# Patient Record
Sex: FEMALE | Race: BLACK OR AFRICAN AMERICAN | NOT HISPANIC OR LATINO | Employment: OTHER | URBAN - METROPOLITAN AREA
[De-identification: names, ages, dates, MRNs, and addresses within clinical notes are randomized per-mention and may not be internally consistent; named-entity substitution may affect disease eponyms.]

---

## 2017-02-17 ENCOUNTER — ALLSCRIPTS OFFICE VISIT (OUTPATIENT)
Dept: OTHER | Facility: OTHER | Age: 42
End: 2017-02-17

## 2017-02-21 ENCOUNTER — LAB (OUTPATIENT)
Dept: LAB | Facility: CLINIC | Age: 42
End: 2017-02-21
Payer: MEDICARE

## 2017-02-21 DIAGNOSIS — E87.8 OTHER DISORDERS OF ELECTROLYTE AND FLUID BALANCE, NOT ELSEWHERE CLASSIFIED: ICD-10-CM

## 2017-02-21 DIAGNOSIS — N18.30 CHRONIC KIDNEY DISEASE, STAGE III (MODERATE) (HCC): ICD-10-CM

## 2017-02-21 LAB
ANION GAP SERPL CALCULATED.3IONS-SCNC: 10 MMOL/L (ref 4–13)
BUN SERPL-MCNC: 17 MG/DL (ref 5–25)
CALCIUM SERPL-MCNC: 9.4 MG/DL (ref 8.3–10.1)
CHLORIDE SERPL-SCNC: 112 MMOL/L (ref 100–108)
CO2 SERPL-SCNC: 24 MMOL/L (ref 21–32)
CREAT SERPL-MCNC: 1.31 MG/DL (ref 0.6–1.3)
GFR SERPL CREATININE-BSD FRML MDRD: 53.7 ML/MIN/1.73SQ M
GLUCOSE SERPL-MCNC: 93 MG/DL (ref 65–140)
POTASSIUM SERPL-SCNC: 3.7 MMOL/L (ref 3.5–5.3)
SODIUM SERPL-SCNC: 146 MMOL/L (ref 136–145)

## 2017-02-21 PROCEDURE — 80048 BASIC METABOLIC PNL TOTAL CA: CPT

## 2017-02-21 PROCEDURE — 36415 COLL VENOUS BLD VENIPUNCTURE: CPT

## 2017-02-23 ENCOUNTER — ALLSCRIPTS OFFICE VISIT (OUTPATIENT)
Dept: OTHER | Facility: OTHER | Age: 42
End: 2017-02-23

## 2017-03-20 ENCOUNTER — LAB CONVERSION - ENCOUNTER (OUTPATIENT)
Dept: OTHER | Facility: OTHER | Age: 42
End: 2017-03-20

## 2017-03-20 LAB — CARBAMAZEPINE LEVEL (HISTORICAL): 6.9 MG/L (ref 4–12)

## 2017-03-21 ENCOUNTER — LAB CONVERSION - ENCOUNTER (OUTPATIENT)
Dept: OTHER | Facility: OTHER | Age: 42
End: 2017-03-21

## 2017-03-21 LAB
CARBAMAZEPINE LEVEL (HISTORICAL): 6.9 MG/L (ref 4–12)
LAMOTRIGINE LEVEL (HISTORICAL): <0.5 MCG/ML (ref 4–18)

## 2017-08-01 ENCOUNTER — LAB CONVERSION - ENCOUNTER (OUTPATIENT)
Dept: OTHER | Facility: OTHER | Age: 42
End: 2017-08-01

## 2017-08-01 DIAGNOSIS — N18.30 CHRONIC KIDNEY DISEASE, STAGE III (MODERATE) (HCC): ICD-10-CM

## 2017-08-01 DIAGNOSIS — E87.8 OTHER DISORDERS OF ELECTROLYTE AND FLUID BALANCE, NOT ELSEWHERE CLASSIFIED: ICD-10-CM

## 2017-08-01 LAB
DEPRECATED RDW RBC AUTO: 11.6 % (ref 11–15)
HCT VFR BLD AUTO: 39.9 % (ref 35–45)
HGB BLD-MCNC: 12.9 G/DL (ref 11.7–15.5)
MCH RBC QN AUTO: 30.4 PG (ref 27–33)
MCHC RBC AUTO-ENTMCNC: 32.3 G/DL (ref 32–36)
MCV RBC AUTO: 93.9 FL (ref 80–100)
PLATELET # BLD AUTO: 299 THOUSAND/UL (ref 140–400)
PMV BLD AUTO: 9.7 FL (ref 7.5–12.5)
RBC # BLD AUTO: 4.25 MILLION/UL (ref 3.8–5.1)
WBC # BLD AUTO: 4.2 THOUSAND/UL (ref 3.8–10.8)

## 2017-08-02 ENCOUNTER — LAB CONVERSION - ENCOUNTER (OUTPATIENT)
Dept: OTHER | Facility: OTHER | Age: 42
End: 2017-08-02

## 2017-08-02 LAB
BUN SERPL-MCNC: 16 MG/DL (ref 7–25)
BUN/CREA RATIO (HISTORICAL): 14 (CALC) (ref 6–22)
CALCIUM SERPL-MCNC: 9.6 MG/DL (ref 8.6–10.2)
CHLORIDE SERPL-SCNC: 113 MMOL/L (ref 98–110)
CO2 SERPL-SCNC: 24 MMOL/L (ref 20–31)
CREAT SERPL-MCNC: 1.18 MG/DL (ref 0.5–1.1)
DEPRECATED RDW RBC AUTO: 11.6 % (ref 11–15)
EGFR AFRICAN AMERICAN (HISTORICAL): 65 ML/MIN/1.73M2
EGFR-AMERICAN CALC (HISTORICAL): 56 ML/MIN/1.73M2
GLUCOSE (HISTORICAL): 89 MG/DL (ref 65–99)
HCT VFR BLD AUTO: 39.9 % (ref 35–45)
HGB BLD-MCNC: 12.9 G/DL (ref 11.7–15.5)
MCH RBC QN AUTO: 30.4 PG (ref 27–33)
MCHC RBC AUTO-ENTMCNC: 32.3 G/DL (ref 32–36)
MCV RBC AUTO: 93.9 FL (ref 80–100)
PHOSPHATE SERPL-MCNC: 3.9 MG/DL (ref 2.5–4.5)
PLATELET # BLD AUTO: 299 THOUSAND/UL (ref 140–400)
PMV BLD AUTO: 9.7 FL (ref 7.5–12.5)
POTASSIUM SERPL-SCNC: 3.8 MMOL/L (ref 3.5–5.3)
RBC # BLD AUTO: 4.25 MILLION/UL (ref 3.8–5.1)
SODIUM SERPL-SCNC: 143 MMOL/L (ref 135–146)
WBC # BLD AUTO: 4.2 THOUSAND/UL (ref 3.8–10.8)

## 2017-08-09 ENCOUNTER — LAB CONVERSION - ENCOUNTER (OUTPATIENT)
Dept: OTHER | Facility: OTHER | Age: 42
End: 2017-08-09

## 2017-08-09 LAB
CREATININE, RANDOM URINE (HISTORICAL): 47 MG/DL (ref 20–320)
PROT UR-MCNC: 4 MG/DL (ref 5–24)
PROT/CREAT UR: 85 MG/G CREAT (ref 21–161)

## 2017-09-02 ENCOUNTER — HOSPITAL ENCOUNTER (EMERGENCY)
Facility: HOSPITAL | Age: 42
Discharge: HOME | End: 2017-09-02
Attending: EMERGENCY MEDICINE
Payer: MEDICARE

## 2017-09-02 VITALS
OXYGEN SATURATION: 100 % | HEART RATE: 82 BPM | WEIGHT: 158.51 LBS | DIASTOLIC BLOOD PRESSURE: 83 MMHG | RESPIRATION RATE: 20 BRPM | SYSTOLIC BLOOD PRESSURE: 135 MMHG | TEMPERATURE: 98.7 F

## 2017-09-02 DIAGNOSIS — F90.9 HYPERACTIVITY (BEHAVIOR): Primary | ICD-10-CM

## 2017-09-02 DIAGNOSIS — G47.9 SLEEP DISTURBANCE: ICD-10-CM

## 2017-09-02 LAB
ALBUMIN SERPL BCP-MCNC: 3.3 G/DL (ref 3.5–5)
ALP SERPL-CCNC: 86 U/L (ref 46–116)
ALT SERPL W P-5'-P-CCNC: 39 U/L (ref 12–78)
ANION GAP SERPL CALCULATED.3IONS-SCNC: 5 MMOL/L (ref 4–13)
AST SERPL W P-5'-P-CCNC: 36 U/L (ref 5–45)
BASOPHILS # BLD AUTO: 0.04 THOUSANDS/ΜL (ref 0–0.1)
BASOPHILS NFR BLD AUTO: 1 % (ref 0–1)
BILIRUB SERPL-MCNC: 0.1 MG/DL (ref 0.2–1)
BUN SERPL-MCNC: 17 MG/DL (ref 5–25)
CALCIUM SERPL-MCNC: 8.8 MG/DL (ref 8.3–10.1)
CARBAMAZEPINE SERPL-MCNC: 9.7 UG/ML (ref 4–12)
CHLORIDE SERPL-SCNC: 109 MMOL/L (ref 100–108)
CO2 SERPL-SCNC: 26 MMOL/L (ref 21–32)
CREAT SERPL-MCNC: 0.94 MG/DL (ref 0.6–1.3)
EOSINOPHIL # BLD AUTO: 0.14 THOUSAND/ΜL (ref 0–0.61)
EOSINOPHIL NFR BLD AUTO: 3 % (ref 0–6)
ERYTHROCYTE [DISTWIDTH] IN BLOOD BY AUTOMATED COUNT: 12.3 % (ref 11.6–15.1)
GFR SERPL CREATININE-BSD FRML MDRD: 85 ML/MIN/1.73SQ M
GLUCOSE SERPL-MCNC: 93 MG/DL (ref 65–140)
HCT VFR BLD AUTO: 39.6 % (ref 34.8–46.1)
HGB BLD-MCNC: 12.2 G/DL (ref 11.5–15.4)
LITHIUM SERPL-SCNC: 0.4 MMOL/L (ref 0.5–1)
LYMPHOCYTES # BLD AUTO: 1.68 THOUSANDS/ΜL (ref 0.6–4.47)
LYMPHOCYTES NFR BLD AUTO: 34 % (ref 14–44)
MCH RBC QN AUTO: 30.4 PG (ref 26.8–34.3)
MCHC RBC AUTO-ENTMCNC: 30.8 G/DL (ref 31.4–37.4)
MCV RBC AUTO: 99 FL (ref 82–98)
MONOCYTES # BLD AUTO: 0.51 THOUSAND/ΜL (ref 0.17–1.22)
MONOCYTES NFR BLD AUTO: 10 % (ref 4–12)
NEUTROPHILS # BLD AUTO: 2.59 THOUSANDS/ΜL (ref 1.85–7.62)
NEUTS SEG NFR BLD AUTO: 52 % (ref 43–75)
PLATELET # BLD AUTO: 266 THOUSANDS/UL (ref 149–390)
PMV BLD AUTO: 9 FL (ref 8.9–12.7)
POTASSIUM SERPL-SCNC: 4 MMOL/L (ref 3.5–5.3)
PROT SERPL-MCNC: 7.1 G/DL (ref 6.4–8.2)
RBC # BLD AUTO: 4.01 MILLION/UL (ref 3.81–5.12)
SODIUM SERPL-SCNC: 140 MMOL/L (ref 136–145)
WBC # BLD AUTO: 4.96 THOUSAND/UL (ref 4.31–10.16)

## 2017-09-02 PROCEDURE — 96374 THER/PROPH/DIAG INJ IV PUSH: CPT

## 2017-09-02 PROCEDURE — 36415 COLL VENOUS BLD VENIPUNCTURE: CPT | Performed by: EMERGENCY MEDICINE

## 2017-09-02 PROCEDURE — 80178 ASSAY OF LITHIUM: CPT | Performed by: EMERGENCY MEDICINE

## 2017-09-02 PROCEDURE — 85025 COMPLETE CBC W/AUTO DIFF WBC: CPT | Performed by: EMERGENCY MEDICINE

## 2017-09-02 PROCEDURE — 99284 EMERGENCY DEPT VISIT MOD MDM: CPT

## 2017-09-02 PROCEDURE — 80156 ASSAY CARBAMAZEPINE TOTAL: CPT | Performed by: EMERGENCY MEDICINE

## 2017-09-02 PROCEDURE — 80053 COMPREHEN METABOLIC PANEL: CPT | Performed by: EMERGENCY MEDICINE

## 2017-09-02 RX ORDER — LORAZEPAM 2 MG/ML
1 INJECTION INTRAMUSCULAR ONCE
Status: COMPLETED | OUTPATIENT
Start: 2017-09-02 | End: 2017-09-02

## 2017-09-02 RX ORDER — DIHYDROERGOTAMINE MESYLATE 1 MG/ML
1 INJECTION, SOLUTION INTRAMUSCULAR; INTRAVENOUS; SUBCUTANEOUS ONCE
Status: DISCONTINUED | OUTPATIENT
Start: 2017-09-02 | End: 2017-09-02

## 2017-09-02 RX ORDER — CLONAZEPAM 0.5 MG/1
0.5 TABLET ORAL 3 TIMES DAILY
COMMUNITY

## 2017-09-02 RX ORDER — LORAZEPAM 1 MG/1
1 TABLET ORAL
Qty: 10 TABLET | Refills: 0 | Status: SHIPPED | OUTPATIENT
Start: 2017-09-02

## 2017-09-02 RX ORDER — RISPERIDONE 2 MG/1
1 TABLET, ORALLY DISINTEGRATING ORAL 3 TIMES DAILY
COMMUNITY

## 2017-09-02 RX ADMIN — LORAZEPAM 1 MG: 2 INJECTION INTRAMUSCULAR; INTRAVENOUS at 18:22

## 2017-09-19 ENCOUNTER — ALLSCRIPTS OFFICE VISIT (OUTPATIENT)
Dept: OTHER | Facility: OTHER | Age: 42
End: 2017-09-19

## 2017-09-26 ENCOUNTER — GENERIC CONVERSION - ENCOUNTER (OUTPATIENT)
Dept: OTHER | Facility: OTHER | Age: 42
End: 2017-09-26

## 2017-09-26 ENCOUNTER — GENERIC CONVERSION - ENCOUNTER (OUTPATIENT)
Dept: NEPHROLOGY | Facility: HOSPITAL | Age: 42
End: 2017-09-26

## 2017-10-12 ENCOUNTER — GENERIC CONVERSION - ENCOUNTER (OUTPATIENT)
Dept: OTHER | Facility: OTHER | Age: 42
End: 2017-10-12

## 2017-10-12 DIAGNOSIS — G40.909 EPILEPSY WITHOUT STATUS EPILEPTICUS, NOT INTRACTABLE (HCC): ICD-10-CM

## 2017-10-12 DIAGNOSIS — N18.30 CHRONIC KIDNEY DISEASE, STAGE III (MODERATE) (HCC): ICD-10-CM

## 2017-10-12 DIAGNOSIS — Z79.899 OTHER LONG TERM (CURRENT) DRUG THERAPY: ICD-10-CM

## 2017-12-21 ENCOUNTER — HOSPITAL ENCOUNTER (OUTPATIENT)
Dept: RADIOLOGY | Facility: HOSPITAL | Age: 42
Discharge: HOME | End: 2017-12-21
Payer: MEDICARE

## 2017-12-21 ENCOUNTER — TRANSCRIBE ORDERS (OUTPATIENT)
Dept: ADMINISTRATIVE | Facility: HOSPITAL | Age: 42
End: 2017-12-21

## 2017-12-21 ENCOUNTER — GENERIC CONVERSION - ENCOUNTER (OUTPATIENT)
Dept: OTHER | Facility: OTHER | Age: 42
End: 2017-12-21

## 2017-12-21 DIAGNOSIS — R05.9 COUGH: Primary | ICD-10-CM

## 2017-12-21 DIAGNOSIS — R05.9 COUGH: ICD-10-CM

## 2017-12-21 PROCEDURE — 71020 HB CHEST X-RAY 2VW FRONTAL&LATL: CPT

## 2018-01-09 NOTE — RESULT NOTES
Verified Results  (1) TOPIRAMATE [id number] 07:42AM Prisca Chang     Test Name Result Flag Reference   TOPIRAMATE 3 6 mcg/mL  see note   *** Unable to flag abnormal result(s), please refer      to reference range(s) below:     THERAPEUTIC RANGES FOR TOPIRAMATE:     Daily Dose      Peak             Trough     (mg)       (mcg/mL)          (mcg/mL)  ------------------------------------------------     100        6 5-9 2           4 5-6 6     200       12 0-16 0          8 0-12 0     400       20 0-30 0         14 0-20 0
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
## 2018-01-11 NOTE — PROGRESS NOTES
Recorded as Task  Date: 06/07/2016 10:50 AM, Created By: Adele Mccarthy  Task Name: Miscellaneous  Assigned To: Abran Garvin  Regarding Patient: Porsha Francis, Status: Active  CommentJarold Nina - 07 Jun 2016 10:50 AM    Brijesh Mary,    I am seeing one of our mutual patient Ms Gómez Oconnor  I wanted to get in touch base with you regarding topamax  I think she could have proximal renal tubular acidosis with low bicarb and  high chloride due to topamax  I was wondering if it is okay to wean her off of that if possible  Thank you      Teri Peterson (Nephrology)  Billie Tafoya - 07 Jun 2016 12:08 PM    TASK REPLIED TO: Previously Assigned To Abran Mary is away for the following couple of weeks on PTO  I'm covering for him  I feel very uneasy with weaning topiramate given that her epilepsy has been under control for the last 4 years with this combination of medications  Are there  any other explanation? why would it cause a problem after so many years being on a stable dose of topiramate? If she was to come off of topiramate we will need to replace the medication  How fast would she have to discontinue topiramate? Can  we delay by 4 weeks to start a new medication or must it be immediate? Adele Mccarthy - 08 Jun 2016 11:46 AM    TASK REPLIED TO: Previously Assigned To Tadeo Tafoya  Thank you for reply  It does not have to be immediate  Topamax causing proximal  RTA is one of the possible explanation  If she is very stable on current regimen, I am okay to monitor her closely  I started her on bicarb suplement and her bicarb slowly improving, so we can continue to monitor for now and if it gets worse, might have  to change her anti epileptic regimen      - Samuel Harrison - 08 Jun 2016 2:02 PM    TASK REPLIED TO: Previously Assigned To Abran Garvin  how soon are you going to recheck her kidney function?     Please let us know when you  feel that her kidney function is worsening, I'll see if she can get a follow-up appointment to discuss AED options  Ronnell Live - 08 Jun 2016 2:14 PM    TASK REPLIED TO: Previously Assigned To Tadeo Tafoya  in 2 months  Amrik Keating  - 08 Jun 2016 3:28 PM    TASK EDITED  I'll leave this open for Dr Juvenal Wong to review  Electronically signed by:Babatunde BRADSHAW    Jun 20 2016  6:37AM EST Review

## 2018-01-13 VITALS
WEIGHT: 142.25 LBS | SYSTOLIC BLOOD PRESSURE: 136 MMHG | DIASTOLIC BLOOD PRESSURE: 76 MMHG | BODY MASS INDEX: 24.28 KG/M2 | HEIGHT: 64 IN

## 2018-01-13 VITALS — RESPIRATION RATE: 20 BRPM | SYSTOLIC BLOOD PRESSURE: 102 MMHG | DIASTOLIC BLOOD PRESSURE: 68 MMHG
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
## 2018-01-13 NOTE — RESULT NOTES
Message   Please make sure PCP has copy of CMP and have caregiver talk with PCP about the results including elevated creatinine  Verified Results  (Q) CBC (H/H, RBC, INDICES, WBC, PLT) [id number] 07:17AKassandra Martin   REPORT COMMENT:  FASTING:YES     Test Name Result Flag Reference   WHITE BLOOD CELL COUNT 5 4 Thousand/uL  3 8-10 8   RED BLOOD CELL COUNT 3 89 Million/uL  3 80-5 10   HEMOGLOBIN 11 9 g/dL  11 7-15 5   HEMATOCRIT 37 7 %  35 0-45 0   MCV 97 1 fL  80 0-100 0   MCH 30 7 pg  27 0-33 0   MCHC 31 6 g/dL L 32 0-36 0   RDW 13 2 %  11 0-15 0   PLATELET COUNT 269 Thousand/uL  140-400   WE RECEIVED YOUR HANDWRITTEN TEST ORDER AND  PERFORMED A HEMOGRAM WITH A PLATELET WITHOUT  A DIFFERENTIAL  IF THIS IS NOT WHAT YOU INTENDED  TO ORDER, PLEASE CONTACT YOUR LOCAL CLIENT SERVICE  REPRESENTATIVE IMMEDIATELY SO THAT WE CAN   ADJUST OUR BILLING APPROPRIATELY  YOU MAY ALSO   INQUIRE ABOUT ALTERNATIVE OR ADDITIONAL TESTING            (Q) CBC (H/H, RBC, INDICES, WBC, PLT) [id number] 07:17AKassandra Martin     Test Name Result Flag Reference   WHITE BLOOD CELL COUNT 5 4 Thousand/uL  3 8-10 8   RED BLOOD CELL COUNT 3 89 Million/uL  3 80-5 10   HEMOGLOBIN 11 9 g/dL  11 7-15 5   HEMATOCRIT 37 7 %  35 0-45 0   MCV 97 1 fL  80 0-100 0   MCH 30 7 pg  27 0-33 0   MCHC 31 6 g/dL L 32 0-36 0   RDW 13 2 %  11 0-15 0   PLATELET COUNT 025 Thousand/uL  140-400   WE RECEIVED YOUR HANDWRITTEN TEST ORDER AND  PERFORMED A HEMOGRAM WITH A PLATELET WITHOUT  A DIFFERENTIAL  IF THIS IS NOT WHAT YOU INTENDED  TO ORDER, PLEASE CONTACT YOUR LOCAL CLIENT SERVICE  REPRESENTATIVE IMMEDIATELY SO THAT WE CAN   ADJUST OUR BILLING APPROPRIATELY  YOU MAY ALSO   INQUIRE ABOUT ALTERNATIVE OR ADDITIONAL TESTING             *(Q) VITAMIN D, 25-HYDROXY, LC/MS/MS [id number] 07:17AKassandra Martin   REPORT COMMENT:  FASTING:YES     Test Name Result Flag Reference   VITAMIN D, 25-OH, TOTAL 40 ng/mL     Vitamin D Status         25-OH Vitamin D: Deficiency:                    <20 ng/mL  Insufficiency:             20 - 29 ng/mL  Optimal:                 > or = 30 ng/mL     For 25-OH Vitamin D testing on patients on   D2-supplementation and patients for whom quantitation   of D2 and D3 fractions is required, the QuestAssureD(TM)  25-OH VIT D, (D2,D3), LC/MS/MS is recommended: order   code 19548 (patients >2yrs)  For more information on this test, go to:  http://education  [employer]/faq/TEQ455  (This link is being provided for   informational/educational purposes only )     (1) COMPREHENSIVE METABOLIC PANEL [id number] 73:07TM Jeanann Day     Test Name Result Flag Reference   GLUCOSE 87 mg/dL  65-99   Fasting reference interval   UREA NITROGEN (BUN) 11 mg/dL  7-25   CREATININE 1 22 mg/dL H 0 50-1 10   eGFR NON-AFR   AMERICAN 55 mL/min/1 73m2 L > OR = 60   eGFR AFRICAN AMERICAN 63 mL/min/1 73m2  > OR = 60   BUN/CREATININE RATIO 9 (calc)  6-22   SODIUM 147 mmol/L H 135-146   POTASSIUM 3 9 mmol/L  3 5-5 3   CHLORIDE 118 mmol/L H    CARBON DIOXIDE 20 mmol/L  19-30   CALCIUM 9 6 mg/dL  8 6-10 2   PROTEIN, TOTAL 6 9 g/dL  6 1-8 1   ALBUMIN 3 9 g/dL  3 6-5 1   GLOBULIN 3 0 g/dL (calc)  1 9-3 7   ALBUMIN/GLOBULIN RATIO 1 3 (calc)  1 0-2 5   BILIRUBIN, TOTAL 0 4 mg/dL  0 2-1 2   ALKALINE PHOSPHATASE 68 U/L     AST 24 U/L  10-30   ALT 18 U/L  6-29

## 2018-01-14 NOTE — RESULT NOTES
Verified Results  (Q) CARBAMAZEPINE, TOTAL [id number] 07:42AM Adkins Lob   REPORT COMMENT:  INCLUDES LAB REF [id number]  FASTING:YES     Test Name Result Flag Reference   CARBAMAZEPINE, TOTAL 6 1 mg/L  4 0-12 0     (1) TOPIRAMATE [id number] 07:42AM Adkins Lob     Test Name Result Flag Reference   TOPIRAMATE 3 6 mcg/mL  see note   *** Unable to flag abnormal result(s), please refer      to reference range(s) below:     THERAPEUTIC RANGES FOR TOPIRAMATE:     Daily Dose      Peak             Trough     (mg)       (mcg/mL)          (mcg/mL)  ------------------------------------------------     100        6 5-9 2           4 5-6 6     200       12 0-16 0          8 0-12 0     400       20 0-30 0         14 0-20 0
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
## 2018-01-16 NOTE — MISCELLANEOUS
Provider Comments  Provider Comments:   Patient No Showed for appt on 9/19/17        Signatures   Electronically signed by : JEREMIAS Eng ; Sep 19 2017 10:10AM EST                       (Author)
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
## 2018-01-22 VITALS
SYSTOLIC BLOOD PRESSURE: 154 MMHG | HEART RATE: 82 BPM | BODY MASS INDEX: 24.63 KG/M2 | RESPIRATION RATE: 16 BRPM | WEIGHT: 143.5 LBS | DIASTOLIC BLOOD PRESSURE: 116 MMHG | OXYGEN SATURATION: 96 %

## 2018-01-22 VITALS — BODY MASS INDEX: 25.12 KG/M2 | WEIGHT: 147.13 LBS | HEIGHT: 64 IN

## 2018-01-22 VITALS — DIASTOLIC BLOOD PRESSURE: 80 MMHG | SYSTOLIC BLOOD PRESSURE: 132 MMHG

## 2018-03-27 ENCOUNTER — TELEPHONE (OUTPATIENT)
Dept: NEUROLOGY | Facility: CLINIC | Age: 43
End: 2018-03-27

## 2018-04-02 DIAGNOSIS — N18.30 CHRONIC KIDNEY DISEASE, STAGE III (MODERATE) (HCC): ICD-10-CM

## 2018-04-03 LAB
BUN SERPL-MCNC: 17 MG/DL (ref 7–25)
BUN/CREAT SERPL: 12 (CALC) (ref 6–22)
CALCIUM SERPL-MCNC: 9.4 MG/DL (ref 8.6–10.2)
CHLORIDE SERPL-SCNC: 114 MMOL/L (ref 98–110)
CO2 SERPL-SCNC: 24 MMOL/L (ref 20–31)
CREAT SERPL-MCNC: 1.37 MG/DL (ref 0.5–1.1)
CREAT UR-MCNC: 40 MG/DL (ref 20–320)
GLUCOSE SERPL-MCNC: 85 MG/DL (ref 65–99)
PHOSPHATE SERPL-MCNC: 4.2 MG/DL (ref 2.5–4.5)
POTASSIUM SERPL-SCNC: 3.8 MMOL/L (ref 3.5–5.3)
PROT UR-MCNC: 4 MG/DL (ref 5–24)
PROT/CREAT UR: 100 MG/G CREAT (ref 21–161)
SL AMB EGFR AFRICAN AMERICAN: 54 ML/MIN/1.73M2
SL AMB EGFR NON AFRICAN AMERICAN: 47 ML/MIN/1.73M2
SODIUM SERPL-SCNC: 144 MMOL/L (ref 135–146)

## 2018-04-04 ENCOUNTER — OFFICE VISIT (OUTPATIENT)
Dept: NEUROLOGY | Facility: CLINIC | Age: 43
End: 2018-04-04
Payer: MEDICARE

## 2018-04-04 ENCOUNTER — OFFICE VISIT (OUTPATIENT)
Dept: NEPHROLOGY | Facility: CLINIC | Age: 43
End: 2018-04-04
Payer: MEDICARE

## 2018-04-04 VITALS
SYSTOLIC BLOOD PRESSURE: 140 MMHG | WEIGHT: 151.1 LBS | DIASTOLIC BLOOD PRESSURE: 84 MMHG | HEART RATE: 79 BPM | BODY MASS INDEX: 25.94 KG/M2

## 2018-04-04 VITALS
WEIGHT: 153.4 LBS | HEIGHT: 64 IN | BODY MASS INDEX: 26.19 KG/M2 | DIASTOLIC BLOOD PRESSURE: 80 MMHG | HEART RATE: 68 BPM | SYSTOLIC BLOOD PRESSURE: 118 MMHG

## 2018-04-04 DIAGNOSIS — I12.9 BENIGN HYPERTENSION WITH CKD (CHRONIC KIDNEY DISEASE) STAGE III (HCC): ICD-10-CM

## 2018-04-04 DIAGNOSIS — N18.30 BENIGN HYPERTENSION WITH CKD (CHRONIC KIDNEY DISEASE) STAGE III (HCC): ICD-10-CM

## 2018-04-04 DIAGNOSIS — G40.209 PARTIAL SYMPTOMATIC EPILEPSY WITH COMPLEX PARTIAL SEIZURES, NOT INTRACTABLE, WITHOUT STATUS EPILEPTICUS (HCC): Primary | ICD-10-CM

## 2018-04-04 DIAGNOSIS — N18.30 CHRONIC KIDNEY DISEASE (CKD), STAGE III (MODERATE) (HCC): Primary | ICD-10-CM

## 2018-04-04 DIAGNOSIS — E87.8 LOW BICARBONATE LEVEL: ICD-10-CM

## 2018-04-04 DIAGNOSIS — E87.0 HYPERNATREMIA: ICD-10-CM

## 2018-04-04 PROCEDURE — 99214 OFFICE O/P EST MOD 30 MIN: CPT | Performed by: INTERNAL MEDICINE

## 2018-04-04 PROCEDURE — 99214 OFFICE O/P EST MOD 30 MIN: CPT | Performed by: PSYCHIATRY & NEUROLOGY

## 2018-04-04 NOTE — PATIENT INSTRUCTIONS
1  Continue current carbamazepine and topiramate unchanged  2  Fax medication list to us  3  Have lab work done and send us the results (carbmazepine level and topirmate level)  4  Return in about 6 months  5  Contact us if considering changes to carbamazepine or topiramate

## 2018-04-04 NOTE — LETTER
April 4, 2018     Lesly Alexander MD  [facility]    Patient: Manuel Palma   YOB: 1975   Date of Visit: 4/4/2018       Dear Dr Wellington Led Recipients: Thank you for referring Moiz Briones to me for evaluation  Below are my notes for this consultation  If you have questions, please do not hesitate to call me  I look forward to following your patient along with you  Sincerely,        Gela Dolan MD        CC: No Recipients  Gela Dolan MD  4/4/2018 12:08 PM  Sign at close encounter  [address] 38 y o  female MRN: [id number]  DATE: 4/4/2018  Reason for visit:   Chief Complaint   Patient presents with    Follow-up    Chronic Kidney Disease     ASSESSMENT and PLAN:  Chronic kidney disease stage III (baseline serum creatinine 1 2, previous baseline used to be 1 0 )  - Last serum creatinine 1 3 slightly elevated from baseline although overall stable  - She could have underlying CK D likely secondary to long-term hypertension, chronic long-term lithium related nephrotoxicity (on lithium >12 years) (chronic interstitial nephritis )  - We'll check BMP before next visit  - UA shows no hematuria or proteinuria in May 2016  Last UPC ratio 100 mg, non-significant    - Recommend to wean patient off lithium if possible   - If patient's creatinine get worse, we'll get renal ultrasound     Hypernatremia, sodium remains at Upper normal range of 144  - Patient is on a set schedule for free water intake  Liberalize more free water intake   -recommend to check intake and urine output for 24 hours and advised to call back to office with results    -advised to drink more free water than current    - Repeat BMP before next visit      Hypertension  - Unable to accurately measure blood pressure as patient is not very cooperative     Recorded blood pressure is well controlled in the office today  - Currently remains on metoprolol   Continue to check blood pressure daily at [facility] and advised to call back if blood pressure is persistently greater than 140/90  Low bicarbonate , improved  - Questionable metabolic acidosis due to Topamax related proximal RTA particularly given hyperchloremia, non-anion gap; urine pH 7 5 after starting sodium bicarbonate supplement on previous evaluation   - Now improved with last serum bicarbonate 24 on current bicarbonate supplement  Continue same   - Patient is overall stable on Topamax from neurology standpoint and will continue same  Diagnoses and all orders for this visit:    Chronic kidney disease (CKD), stage III (moderate)  -     Basic metabolic panel; Future  -     CBC; Future  -     Protein / creatinine ratio, urine; Future  -     Magnesium; Future  -     Basic metabolic panel  -     CBC  -     Magnesium    Benign hypertension with CKD (chronic kidney disease) stage III    Hypernatremia    Low bicarbonate level          SUBJECTIVE / HPI:  Patient is 57-year-old female with significant past medical history of developmental disorder, mental retardation, occipital injury in the past, history of epilepsy, hypertension for at least 7 years, comes for regular follow-up of renal failure and hypernatremia  Previous baseline creatinine 1 0 going back to 2013 although her creatinine has been in the range of 1 2-1 3 since January 2016  She lives at group home and accompanied by staff from [facility] in the office today  She is unable to provide any history and remains occasionally agitated  Most of the history is obtained from reviewing medical records and talking to the staff person  She is on a regular set schedule to drink water due to her significant psychiatric issues  As per the staff person, patient gets her blood pressure checked every day which is usually in acceptable range  I do not have actual readings  Denies having any nausea, vomiting or diarrhea   Unable to comment whether patient has any urinary complaint or not  Patient has been on metoprolol for hypertension  I was able to check blood pressure although likely not accurate as patient is not cooperative and remains agitated and talks at times while checking blood pressure  She is also on Topamax, carbamazepine, long-term chronic lithium, buspirone, chlorpromazine, and benztropine   Updated medication list was not available/brought during this  office visit  REVIEW OF SYSTEMS:    Review of Systems   Unable to perform ROS: Psychiatric disorder       PHYSICAL EXAM:  Vitals:    04/04/18 0910   BP: 118/80   BP Location: Left arm   Patient Position: Sitting   Cuff Size: Standard   Pulse: 68   Weight: 69 6 kg (153 lb 6 4 oz)   Height: 5' 4" (1 626 m)     Body mass index is 26 33 kg/m²  Physical Exam   Constitutional: She appears well-developed and well-nourished  HENT:   Head: Normocephalic and atraumatic  Right Ear: External ear normal    Left Ear: External ear normal    Eyes: Conjunctivae are normal    Neck: Neck supple  No JVD present  Cardiovascular: Normal rate and normal heart sounds  Pulmonary/Chest: Effort normal and breath sounds normal  She has no wheezes  She has no rales  Abdominal: Soft  Bowel sounds are normal  She exhibits no distension  There is no tenderness  Musculoskeletal: She exhibits no edema or tenderness  Neurological: She is alert  Overall limited examination as patient does not answer any questions  Does not follow any commands  Skin: Skin is warm and dry  No rash noted  Vitals reviewed  PAST MEDICAL HISTORY:  Past Medical History:   Diagnosis Date    [facility] ([facility] [address] )     Encephalopathy     Hypertension     Seizures ([facility] [address] )        PAST SURGICAL HISTORY:  No past surgical history on file      SOCIAL HISTORY:  History   Alcohol Use No     History   Drug Use No     History   Smoking Status    Never Smoker   Smokeless Tobacco    Never Used       FAMILY HISTORY:  Family History   Problem Relation Age of Onset    Family history unknown: Yes       MEDICATIONS:    Current Outpatient Prescriptions:     clonazePAM (KlonoPIN) 0 5 mg tablet, Take 0 5 mg by mouth 2 (two) times a day, Disp: , Rfl:     risperiDONE (RisperDAL) 3 mg tablet, Take 3 mg by mouth 3 (three) times a day, Disp: , Rfl:     acetaminophen (TYLENOL) 500 mg tablet, Take 500 mg by mouth every 4 (four) hours as needed for mild pain, Disp: , Rfl:     ammonium lactate (LAC-HYDRIN) 12 % cream, Apply 1 application topically 2 (two) times a day, Disp: , Rfl:     B Complex Vitamins (VITAMIN B COMPLEX PO), Take 1 tablet by mouth 2 (two) times a day, Disp: , Rfl:     benztropine (COGENTIN) 1 mg tablet, Take 1 mg by mouth 2 (two) times a day, Disp: , Rfl:     betamethasone dipropionate (DIPROSONE) 0 05 % cream, Apply 1 application topically 2 (two) times a day, Disp: , Rfl:     busPIRone (BUSPAR) 15 mg tablet, Take 15 mg by mouth 2 (two) times a day, Disp: , Rfl:     Calcium Citrate-Vitamin D (CALCIUM + D PO), Take 1 tablet by mouth 2 (two) times a day, Disp: , Rfl:     carBAMazepine (TEGretol) 200 mg tablet, Take 200 mg by mouth 2 (two) times a day, Disp: , Rfl:     chlorhexidine (PERIDEX) 0 12 % solution, Apply 15 mL to the mouth or throat 2 (two) times a day, Disp: , Rfl:     chlorproMAZINE (THORAZINE) 50 mg tablet, Take 50 mg by mouth 3 (three) times a day  , Disp: , Rfl:     fexofenadine (ALLEGRA) 180 MG tablet, Take 180 mg by mouth daily, Disp: , Rfl:     fluticasone (FLONASE) 50 mcg/act nasal spray, 1 spray into each nostril daily, Disp: , Rfl:     lithium carbonate 300 mg capsule, Take 300 mg by mouth 2 (two) times a day with meals, Disp: , Rfl:     LORazepam (ATIVAN) 1 mg tablet, Take 1 tablet by mouth daily at bedtime as needed (increased activity limiting sleep), Disp: 10 tablet, Rfl: 0    medroxyPROGESTERone (DEPO-PROVERA) 150 mg/mL injection, Inject 150 mg into the shoulder, thigh, or buttocks every 3 (three) months, Disp: , Rfl:    metoprolol tartrate (LOPRESSOR) 50 mg tablet, Take 50 mg by mouth 2 (two) times a day, Disp: , Rfl:     montelukast (SINGULAIR) 10 mg tablet, Take 10 mg by mouth daily at bedtime, Disp: , Rfl:     Multiple Vitamins-Minerals (CERTAVITE/ANTIOXIDANTS PO), Take 1 tablet by mouth daily, Disp: , Rfl:     neomycin-bacitracin-polymyxin b (NEOSPORIN) ointment, Apply 1 application topically daily, Disp: , Rfl:     olopatadine HCl (PATADAY) 0 2 % opth drops, Administer 1 drop to both eyes daily, Disp: , Rfl:     Psyllium (METAMUCIL) WAFR, Take 1 Wafer by mouth daily, Disp: , Rfl:     sodium bicarbonate 650 mg tablet, Take 650 mg by mouth 2 (two) times a day, Disp: , Rfl:     topiramate (TOPAMAX) 100 mg tablet, Take 100 mg by mouth 3 (three) times a day, Disp: , Rfl:     Lab Results:   Results for orders placed or performed in visit on 04/02/18   Phosphorus   Result Value Ref Range    Phosphorus, Serum 4 2 2 5 - 4 5 mg/dL   Basic metabolic panel   Result Value Ref Range    SL AMB GLUCOSE 85 65 - 99 mg/dL    BUN 17 7 - 25 mg/dL    Creatinine, Serum 1 37 (H) 0 50 - 1 10 mg/dL    eGFR Non  47 (L) > OR = 60 mL/min/1 73m2    SL AMB EGFR  54 (L) > OR = 60 mL/min/1 73m2    SL AMB BUN/CREATININE RATIO 12 6 - 22 (calc)    SL AMB SODIUM 144 135 - 146 mmol/L    SL AMB POTASSIUM 3 8 3 5 - 5 3 mmol/L    SL AMB CHLORIDE 114 (H) 98 - 110 mmol/L    SL AMB CARBON DIOXIDE 24 20 - 31 mmol/L    SL AMB CALCIUM 9 4 8 6 - 10 2 mg/dL   Protein, Total w/Creat, Random Urine   Result Value Ref Range    Creatinine, Urine 40 20 - 320 mg/dL    Protein/Creat Ratio 100 21 - 161 mg/g creat    SL AMB TOTAL PROTEIN, URINE 4 (L) 5 - 24 mg/dL
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
## 2018-04-04 NOTE — PROGRESS NOTES
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) (chronic interstitial nephritis )  - We'll check BMP before next visit  - UA shows no hematuria or proteinuria in May 2016  Last UPC ratio 100 mg, non-significant    - Recommend to wean patient off lithium if possible   - If patient's creatinine get worse, we'll get renal ultrasound     Hypernatremia, sodium remains at Upper normal range of 144  - Patient is on a set schedule for free water intake  Liberalize more free water intake   -recommend to check intake and urine output for 24 hours and advised to call back to office with results    -advised to drink more free water than current    - Repeat BMP before next visit      Hypertension  - Unable to accurately measure blood pressure as patient is not very cooperative     Recorded blood pressure is well controlled in the office today  - Currently remains on metoprolol  Continue to check blood pressure daily at retirement and advised to call back if blood pressure is persistently greater than 140/90  Low bicarbonate , improved  - Questionable metabolic acidosis due to Topamax related proximal RTA particularly given hyperchloremia, non-anion gap; urine pH 7 5 after starting sodium bicarbonate supplement on previous evaluation   - Now improved with last serum bicarbonate 24 on current bicarbonate supplement   Continue same   - Patient is overall stable on Topamax from neurology standpoint and will continue same      Diagnoses and all orders for this visit:    Chronic kidney disease (CKD), stage III (moderate)  -     Basic metabolic panel; Future  -     CBC; Future  -     Protein / creatinine ratio, urine; Future  -     Magnesium; Future  -     Basic metabolic panel  -     CBC  -     Magnesium    Benign hypertension with CKD (chronic kidney disease) stage III    Hypernatremia    Low bicarbonate level          SUBJECTIVE / HPI:  Patient is 64-year-old female with significant past medical history of developmental disorder, mental retardation, occipital injury in the past, history of epilepsy, hypertension for at least 7 years, comes for regular follow-up of renal failure and hypernatremia  Previous baseline creatinine 1 0 going back to 2013 although her creatinine has been in the range of 1 2-1 3 since January 2016  She lives at group home and accompanied by staff from [facility] in the office today  She is unable to provide any history and remains occasionally agitated  Most of the history is obtained from reviewing medical records and talking to the staff person  She is on a regular set schedule to drink water due to her significant psychiatric issues  As per the staff person, patient gets her blood pressure checked every day which is usually in acceptable range  I do not have actual readings  Denies having any nausea, vomiting or diarrhea  Unable to comment whether patient has any urinary complaint or not  Patient has been on metoprolol for hypertension  I was able to check blood pressure although likely not accurate as patient is not cooperative and remains agitated and talks at times while checking blood pressure  She is also on Topamax, carbamazepine, long-term chronic lithium, buspirone, chlorpromazine, and benztropine   Updated medication list was not available/brought during this  office visit       REVIEW OF SYSTEMS:    Review of Systems   Unable to perform ROS: Psychiatric disorder       PHYSICAL EXAM:  Vitals:    04/04/18 0910   BP: 118/80   BP Location: Left arm   Patient Position: Sitting   Cuff Size: Standard   Pulse: 68   Weight: 69 6 kg (153 lb 6 4 oz)   Height: 5' 4" (1 626 m)     Body mass index is 26 33 kg/m²  Physical Exam   Constitutional: She appears well-developed and well-nourished  HENT:   Head: Normocephalic and atraumatic  Right Ear: External ear normal    Left Ear: External ear normal    Eyes: Conjunctivae are normal    Neck: Neck supple  No JVD present  Cardiovascular: Normal rate and normal heart sounds  Pulmonary/Chest: Effort normal and breath sounds normal  She has no wheezes  She has no rales  Abdominal: Soft  Bowel sounds are normal  She exhibits no distension  There is no tenderness  Musculoskeletal: She exhibits no edema or tenderness  Neurological: She is alert  Overall limited examination as patient does not answer any questions  Does not follow any commands  Skin: Skin is warm and dry  No rash noted  Vitals reviewed  PAST MEDICAL HISTORY:  Past Medical History:   Diagnosis Date    [facility] injury ([facility] )     Encephalopathy     Hypertension     Seizures ([facility] )        PAST SURGICAL HISTORY:  No past surgical history on file      SOCIAL HISTORY:  History   Alcohol Use No     History   Drug Use No     History   Smoking Status    Never Smoker   Smokeless Tobacco    Never Used       FAMILY HISTORY:  Family History   Problem Relation Age of Onset    Family history unknown: Yes       MEDICATIONS:    Current Outpatient Prescriptions:     clonazePAM (KlonoPIN) 0 5 mg tablet, Take 0 5 mg by mouth 2 (two) times a day, Disp: , Rfl:     risperiDONE (RisperDAL) 3 mg tablet, Take 3 mg by mouth 3 (three) times a day, Disp: , Rfl:     acetaminophen (TYLENOL) 500 mg tablet, Take 500 mg by mouth every 4 (four) hours as needed for mild pain, Disp: , Rfl:     ammonium lactate (LAC-HYDRIN) 12 % cream, Apply 1 application topically 2 (two) times a day, Disp: , Rfl:    B Complex Vitamins (VITAMIN B COMPLEX PO), Take 1 tablet by mouth 2 (two) times a day, Disp: , Rfl:     benztropine (COGENTIN) 1 mg tablet, Take 1 mg by mouth 2 (two) times a day, Disp: , Rfl:     betamethasone dipropionate (DIPROSONE) 0 05 % cream, Apply 1 application topically 2 (two) times a day, Disp: , Rfl:     busPIRone (BUSPAR) 15 mg tablet, Take 15 mg by mouth 2 (two) times a day, Disp: , Rfl:     Calcium Citrate-Vitamin D (CALCIUM + D PO), Take 1 tablet by mouth 2 (two) times a day, Disp: , Rfl:     carBAMazepine (TEGretol) 200 mg tablet, Take 200 mg by mouth 2 (two) times a day, Disp: , Rfl:     chlorhexidine (PERIDEX) 0 12 % solution, Apply 15 mL to the mouth or throat 2 (two) times a day, Disp: , Rfl:     chlorproMAZINE (THORAZINE) 50 mg tablet, Take 50 mg by mouth 3 (three) times a day  , Disp: , Rfl:     fexofenadine (ALLEGRA) 180 MG tablet, Take 180 mg by mouth daily, Disp: , Rfl:     fluticasone (FLONASE) 50 mcg/act nasal spray, 1 spray into each nostril daily, Disp: , Rfl:     lithium carbonate 300 mg capsule, Take 300 mg by mouth 2 (two) times a day with meals, Disp: , Rfl:     LORazepam (ATIVAN) 1 mg tablet, Take 1 tablet by mouth daily at bedtime as needed (increased activity limiting sleep), Disp: 10 tablet, Rfl: 0    medroxyPROGESTERone (DEPO-PROVERA) 150 mg/mL injection, Inject 150 mg into the shoulder, thigh, or buttocks every 3 (three) months, Disp: , Rfl:     metoprolol tartrate (LOPRESSOR) 50 mg tablet, Take 50 mg by mouth 2 (two) times a day, Disp: , Rfl:     montelukast (SINGULAIR) 10 mg tablet, Take 10 mg by mouth daily at bedtime, Disp: , Rfl:     Multiple Vitamins-Minerals (CERTAVITE/ANTIOXIDANTS PO), Take 1 tablet by mouth daily, Disp: , Rfl:     neomycin-bacitracin-polymyxin b (NEOSPORIN) ointment, Apply 1 application topically daily, Disp: , Rfl:     olopatadine HCl (PATADAY) 0 2 % opth drops, Administer 1 drop to both eyes daily, Disp: , Rfl:     Psyllium (METAMUCIL) WAFR, Take 1 Wafer by mouth daily, Disp: , Rfl:     sodium bicarbonate 650 mg tablet, Take 650 mg by mouth 2 (two) times a day, Disp: , Rfl:     topiramate (TOPAMAX) 100 mg tablet, Take 100 mg by mouth 3 (three) times a day, Disp: , Rfl:     Lab Results:   Results for orders placed or performed in visit on 04/02/18   Phosphorus   Result Value Ref Range    Phosphorus, Serum 4 2 2 5 - 4 5 mg/dL   Basic metabolic panel   Result Value Ref Range    SL AMB GLUCOSE 85 65 - 99 mg/dL    BUN 17 7 - 25 mg/dL    Creatinine, Serum 1 37 (H) 0 50 - 1 10 mg/dL    eGFR Non  47 (L) > OR = 60 mL/min/1 73m2    SL AMB EGFR  54 (L) > OR = 60 mL/min/1 73m2    SL AMB BUN/CREATININE RATIO 12 6 - 22 (calc)    SL AMB SODIUM 144 135 - 146 mmol/L    SL AMB POTASSIUM 3 8 3 5 - 5 3 mmol/L    SL AMB CHLORIDE 114 (H) 98 - 110 mmol/L    SL AMB CARBON DIOXIDE 24 20 - 31 mmol/L    SL AMB CALCIUM 9 4 8 6 - 10 2 mg/dL   Protein, Total w/Creat, Random Urine   Result Value Ref Range    Creatinine, Urine 40 20 - 320 mg/dL    Protein/Creat Ratio 100 21 - 161 mg/g creat    SL AMB TOTAL PROTEIN, URINE 4 (L) 5 - 24 mg/dL

## 2018-04-04 NOTE — PROGRESS NOTES
Review of Systems   Constitutional: Positive for fatigue  Negative for appetite change and fever  HENT: Negative  Negative for hearing loss, tinnitus, trouble swallowing and voice change  Eyes: Negative  Negative for photophobia and pain  Respiratory: Negative  Negative for shortness of breath  Cardiovascular: Negative  Negative for palpitations  Gastrointestinal: Negative  Negative for nausea and vomiting  Endocrine: Negative  Negative for cold intolerance and heat intolerance  Genitourinary: Negative  Negative for dysuria, frequency and urgency  Musculoskeletal: Negative  Negative for myalgias and neck pain  Skin: Negative  Negative for rash  Neurological: Negative  Negative for dizziness, tremors, seizures, syncope, facial asymmetry, speech difficulty, weakness, light-headedness, numbness and headaches  Hematological: Negative  Does not bruise/bleed easily  Psychiatric/Behavioral: Positive for sleep disturbance  Negative for confusion and hallucinations

## 2018-04-08 ENCOUNTER — TELEPHONE (OUTPATIENT)
Dept: NEPHROLOGY | Facility: CLINIC | Age: 43
End: 2018-04-08

## 2018-04-09 NOTE — TELEPHONE ENCOUNTER
I reviewed 24 hour intake and output  Intake is 54 oz (roughly equal to 1600 ml) and output 4450 ml  Can we have them do another intake and output starting 7AM to next day 7 AM with accurate intake and output and have them send us again this  Also they need to have her drink 75-80 oz liquid daily

## 2018-04-12 NOTE — TELEPHONE ENCOUNTER
Spoke with Reese Soria from group home and she is aware of instructions and they asked me to fax a fax cover sheet with instructions on it  I faxed instructions to Reese Soria

## 2018-08-28 LAB
BUN SERPL-MCNC: 15 MG/DL (ref 7–25)
BUN/CREAT SERPL: 12 (CALC) (ref 6–22)
CALCIUM SERPL-MCNC: 9.5 MG/DL (ref 8.6–10.2)
CHLORIDE SERPL-SCNC: 110 MMOL/L (ref 98–110)
CO2 SERPL-SCNC: 16 MMOL/L (ref 20–32)
CREAT SERPL-MCNC: 1.3 MG/DL (ref 0.5–1.1)
GLUCOSE SERPL-MCNC: 82 MG/DL (ref 65–99)
MAGNESIUM SERPL-MCNC: 2.1 MG/DL (ref 1.5–2.5)
POTASSIUM SERPL-SCNC: 4 MMOL/L (ref 3.5–5.3)
SL AMB EGFR AFRICAN AMERICAN: 57 ML/MIN/1.73M2
SL AMB EGFR NON AFRICAN AMERICAN: 50 ML/MIN/1.73M2
SODIUM SERPL-SCNC: 139 MMOL/L (ref 135–146)

## 2018-08-30 LAB
CREAT UR-MCNC: 31 MG/DL (ref 20–320)
ERYTHROCYTE [DISTWIDTH] IN BLOOD BY AUTOMATED COUNT: 11.8 % (ref 11–15)
HCT VFR BLD AUTO: 37.8 % (ref 35–45)
HGB BLD-MCNC: 12.8 G/DL (ref 11.7–15.5)
MCH RBC QN AUTO: 31.8 PG (ref 27–33)
MCHC RBC AUTO-ENTMCNC: 33.9 G/DL (ref 32–36)
MCV RBC AUTO: 94 FL (ref 80–100)
PLATELET # BLD AUTO: 261 THOUSAND/UL (ref 140–400)
PMV BLD REES-ECKER: 10 FL (ref 7.5–12.5)
PROT UR-MCNC: 4 MG/DL (ref 5–24)
PROT/CREAT UR: 129 MG/G CREAT (ref 21–161)
RBC # BLD AUTO: 4.02 MILLION/UL (ref 3.8–5.1)
WBC # BLD AUTO: 5.6 THOUSAND/UL (ref 3.8–10.8)

## 2018-09-01 ENCOUNTER — TELEPHONE (OUTPATIENT)
Dept: OTHER | Facility: HOSPITAL | Age: 43
End: 2018-09-01

## 2018-09-01 NOTE — TELEPHONE ENCOUNTER
Can you let her know other serum creatinine remains stable at 1 3  Also bicarb level has dropped and will increase sodium bicarbonate tablet one tablet p  o  b i d  to two tablets p o  b i d  She should have repeat BMP in two weeks after increasing dose

## 2018-09-04 DIAGNOSIS — N18.30 CHRONIC KIDNEY DISEASE, STAGE 3 (HCC): Primary | ICD-10-CM

## 2018-09-05 DIAGNOSIS — E87.2 METABOLIC ACIDOSIS: Primary | ICD-10-CM

## 2018-09-05 RX ORDER — SODIUM BICARBONATE 650 MG/1
TABLET ORAL
Qty: 120 TABLET | Refills: 3 | Status: SHIPPED | OUTPATIENT
Start: 2018-09-05 | End: 2018-12-31 | Stop reason: SDUPTHER

## 2018-09-22 LAB
BUN SERPL-MCNC: 20 MG/DL (ref 7–25)
BUN/CREAT SERPL: 15 (CALC) (ref 6–22)
CALCIUM SERPL-MCNC: 9.3 MG/DL (ref 8.6–10.2)
CHLORIDE SERPL-SCNC: 114 MMOL/L (ref 98–110)
CO2 SERPL-SCNC: 21 MMOL/L (ref 20–32)
CREAT SERPL-MCNC: 1.36 MG/DL (ref 0.5–1.1)
GLUCOSE SERPL-MCNC: 93 MG/DL (ref 65–99)
POTASSIUM SERPL-SCNC: 3.8 MMOL/L (ref 3.5–5.3)
SL AMB EGFR AFRICAN AMERICAN: 54 ML/MIN/1.73M2
SL AMB EGFR NON AFRICAN AMERICAN: 47 ML/MIN/1.73M2
SODIUM SERPL-SCNC: 143 MMOL/L (ref 135–146)

## 2018-09-24 ENCOUNTER — OFFICE VISIT (OUTPATIENT)
Dept: NEPHROLOGY | Facility: CLINIC | Age: 43
End: 2018-09-24
Payer: MEDICARE

## 2018-09-24 VITALS
RESPIRATION RATE: 16 BRPM | DIASTOLIC BLOOD PRESSURE: 74 MMHG | BODY MASS INDEX: 27.55 KG/M2 | SYSTOLIC BLOOD PRESSURE: 116 MMHG | WEIGHT: 161.38 LBS | HEIGHT: 64 IN

## 2018-09-24 DIAGNOSIS — I12.9 BENIGN HYPERTENSION WITH CKD (CHRONIC KIDNEY DISEASE) STAGE III (HCC): ICD-10-CM

## 2018-09-24 DIAGNOSIS — N18.30 CHRONIC KIDNEY DISEASE (CKD), STAGE III (MODERATE) (HCC): Primary | ICD-10-CM

## 2018-09-24 DIAGNOSIS — E87.0 HYPERNATREMIA: ICD-10-CM

## 2018-09-24 DIAGNOSIS — E87.2 METABOLIC ACIDOSIS: ICD-10-CM

## 2018-09-24 DIAGNOSIS — E87.8 LOW BICARBONATE LEVEL: ICD-10-CM

## 2018-09-24 DIAGNOSIS — N18.30 BENIGN HYPERTENSION WITH CKD (CHRONIC KIDNEY DISEASE) STAGE III (HCC): ICD-10-CM

## 2018-09-24 PROCEDURE — 99214 OFFICE O/P EST MOD 30 MIN: CPT | Performed by: INTERNAL MEDICINE

## 2018-09-24 NOTE — PROGRESS NOTES
NEPHROLOGY OUTPATIENT PROGRESS NOTE   Abdirashid Rascon 37 y o  female MRN: [id number]  DATE: 9/24/2018  Reason for visit:   Chief Complaint   Patient presents with    Follow-up    Chronic Kidney Disease     ASSESSMENT and PLAN:  Chronic kidney disease stage III (baseline serum creatinine 1 3, previous baseline used to be 1 0 )  - Last serum creatinine 1 3 in September 2018 overall stable at baseline  She has overall slow progression of CKD  - She could have underlying CK D likely secondary to long-term hypertension, chronic long-term lithium related nephrotoxicity (on lithium >12 years which can cause chronic interstitial nephritis )  - We'll check BMP before next visit  -previous urinalysis showed no hematuria or proteinuria in 2016  Recent UPC ratio 129 mg, nonsignificant in August 2018  - Recommend to wean patient off lithium if possible   -check renal ultrasound before next visit      Upper normal serum sodium level at 143    -fluid restriction was discontinued during last visit  -one report of 24 hr urine output 4 4 L in April 2018  Would like to get repeat 24 hr intake and output readings  This was communicated with caretaker present along with the patient  They will call back to office with 24 hr urine output and intake readings   -if remains consistently polyuric, will consider urine osmolality to further evaluate any component of diabetes insipidus in the setting of lithium  - Repeat BMP before next visit      Hypertension  -due to patient's developmental disorder, patient does not sit still to be able to check blood pressure accurately  To the best of my ability, her blood pressure was noted to be 116/74  patient is not very cooperative to sit still      - Currently remains on metoprolol   Continue to check blood pressure daily at intermediate and advised to call back if blood pressure is persistently greater than 140/90      Low bicarbonate , improved  -bicarb level had dropped to 16 in August 2018 when sodium bicarbonate supplement was increased to two tablets p o  b i d   Since then bicarb level has improved to 21  Continue current regimen  - Questionable metabolic acidosis could be due to Topamax related proximal RTA particularly given hyperchloremia, non-anion gap; urine pH 7 5 after starting sodium bicarbonate supplement on previous evaluation   -if bicarb continues to drop further or requires further increase in sodium bicarb supplement, may need to consider discontinuing Topamax  Diagnoses and all orders for this visit:    Chronic kidney disease (CKD), stage III (moderate)  -     Basic metabolic panel; Future  -     Urinalysis with reflex to microscopic; Future  -     Protein / creatinine ratio, urine; Future  -     Magnesium; Future  -     US retroperitoneal complete; Future    Benign hypertension with CKD (chronic kidney disease) stage III  -     US retroperitoneal complete; Future    Low bicarbonate level  -     Basic metabolic panel; Future    Hypernatremia    Metabolic acidosis        SUBJECTIVE / HPI:  Patient is 57-year-old female with significant past medical history of developmental disorder, mental retardation, occipital injury in the past, history of epilepsy, hypertension for at least 8 years, comes for regular follow-up of renal failure and hypernatremia  Previous baseline creatinine 1 0 going back to 2013 although her creatinine has been in the range of 1 2-1 3 since January 2016  She lives at group [place] and accompanied by staff from [facility] in the office today  She is unable to provide any history and remains frequently agitated  Most of the history is obtained from reviewing medical records and talking to the staff person      Last serum creatinine 1 3 overall stable  As per the staff person, blood pressure is overall acceptable at [facility]  I do not have blood pressure readings available from group home  Unable to comment whether patient has any urinary complaint or not  Patient has been on metoprolol for hypertension  She is also on Topamax, carbamazepine, long-term chronic lithium, buspirone, chlorpromazine, and benztropine   REVIEW OF SYSTEMS:  Unable to review any systems as patient does not answer questions  PHYSICAL EXAM:  Vitals:    09/24/18 1321 09/24/18 1322 09/24/18 1344   BP:   116/74   Resp:  16    Weight:  73 2 kg (161 lb 6 oz)    Height: 5' 4" (1 626 m) 5' 4" (1 626 m)      Body mass index is 27 7 kg/m²  Physical Exam   Constitutional: She appears well-developed and well-nourished  HENT:   Head: Normocephalic and atraumatic  Right Ear: External ear normal    Left Ear: External ear normal    Eyes: Conjunctivae and EOM are normal    Neck: Neck supple  No JVD present  Cardiovascular: Normal rate and normal heart sounds  Pulmonary/Chest: Effort normal and breath sounds normal  She has no wheezes  She has no rales  Abdominal: Soft  Bowel sounds are normal  She exhibits no distension  There is no tenderness  Musculoskeletal: She exhibits no edema or tenderness  Neurological: She is alert  Active, alert, often agitated, does not answer questions appropriately  Skin: Skin is warm and dry  No rash noted  Psychiatric:   Underlying developmental disorder   Vitals reviewed  PAST MEDICAL HISTORY:  Past Medical History:   Diagnosis Date    [facility]     Encephalopathy     Hypertension     [facility]ures [facility]        PAST SURGICAL HISTORY:  History reviewed  No pertinent surgical history      SOCIAL HISTORY:  History   Alcohol Use No     History   Drug Use No     History   Smoking Status    Never Smoker   Smokeless Tobacco    Never Used       FAMILY HISTORY:  Family History   Problem Relation Age of Onset    Family history unknown: Yes       MEDICATIONS:    Current Outpatient Prescriptions:     acetaminophen (TYLENOL) 500 mg tablet, Take 500 mg by mouth every 4 (four) hours as needed for mild pain, Disp: , Rfl:     ammonium lactate (LAC-HYDRIN) 12 % cream, Apply 1 application topically 2 (two) times a day, Disp: , Rfl:     B Complex Vitamins (VITAMIN B COMPLEX PO), Take 1 tablet by mouth 2 (two) times a day, Disp: , Rfl:     benztropine (COGENTIN) 1 mg tablet, Take 1 mg by mouth 2 (two) times a day, Disp: , Rfl:     betamethasone dipropionate (DIPROSONE) 0 05 % cream, Apply 1 application topically 2 (two) times a day, Disp: , Rfl:     busPIRone (BUSPAR) 15 mg tablet, Take 15 mg by mouth 2 (two) times a day, Disp: , Rfl:     Calcium Citrate-Vitamin D (CALCIUM + D PO), Take 1 tablet by mouth 2 (two) times a day, Disp: , Rfl:     carBAMazepine (TEGretol) 200 mg tablet, Take 200 mg by mouth 2 (two) times a day, Disp: , Rfl:     chlorhexidine (PERIDEX) 0 12 % solution, Apply 15 mL to the mouth or throat 2 (two) times a day, Disp: , Rfl:     chlorproMAZINE (THORAZINE) 50 mg tablet, Take 50 mg by mouth 3 (three) times a day  , Disp: , Rfl:     clonazePAM (KlonoPIN) 0 5 mg tablet, Take 0 5 mg by mouth 2 (two) times a day, Disp: , Rfl:     fexofenadine (ALLEGRA) 180 MG tablet, Take 180 mg by mouth daily, Disp: , Rfl:     fluticasone (FLONASE) 50 mcg/act nasal spray, 1 spray into each nostril daily, Disp: , Rfl:     lithium carbonate 300 mg capsule, Take 300 mg by mouth 2 (two) times a day with meals, Disp: , Rfl:     LORazepam (ATIVAN) 1 mg tablet, Take 1 tablet by mouth daily at bedtime as needed (increased activity limiting sleep), Disp: 10 tablet, Rfl: 0    medroxyPROGESTERone (DEPO-PROVERA) 150 mg/mL injection, Inject 150 mg into the shoulder, thigh, or buttocks every 3 (three) months, Disp: , Rfl:     metoprolol tartrate (LOPRESSOR) 50 mg tablet, Take 50 mg by mouth 2 (two) times a day, Disp: , Rfl:     montelukast (SINGULAIR) 10 mg tablet, Take 10 mg by mouth daily at bedtime, Disp: , Rfl:     Multiple Vitamins-Minerals (CERTAVITE/ANTIOXIDANTS PO), Take 1 tablet by mouth daily, Disp: , Rfl:     neomycin-bacitracin-polymyxin b (NEOSPORIN) ointment, Apply 1 application topically daily, Disp: , Rfl:     olopatadine HCl (PATADAY) 0 2 % opth drops, Administer 1 drop to both eyes daily, Disp: , Rfl:     Psyllium (METAMUCIL) WAFR, Take 1 Wafer by mouth daily, Disp: , Rfl:     risperiDONE (RisperDAL M-TABS) 2 mg dispersible tablet, Take 3 mg by mouth 3 (three) times a day, Disp: , Rfl:     sodium bicarbonate 650 mg tablet, Take 2 tablets twice a day, Disp: 120 tablet, Rfl: 3    topiramate (TOPAMAX) 100 mg tablet, Take 100 mg by mouth 3 (three) times a day, Disp: , Rfl:     Lab Results:   Results for orders placed or performed in visit on 09/20/69   Basic metabolic panel   Result Value Ref Range    Glucose 93 65 - 99 mg/dL    BUN 20 7 - 25 mg/dL    Creatinine 1 36 (H) 0 50 - 1 10 mg/dL    eGFR Non  47 (L) > OR = 60 mL/min/1 73m2    SL AMB EGFR  54 (L) > OR = 60 mL/min/1 73m2    SL AMB BUN/CREATININE RATIO 15 6 - 22 (calc)    Sodium 143 135 - 146 mmol/L    SL AMB POTASSIUM 3 8 3 5 - 5 3 mmol/L    Chloride 114 (H) 98 - 110 mmol/L    CO2 21 20 - 32 mmol/L    SL AMB CALCIUM 9 3 8 6 - 10 2 mg/dL

## 2018-09-24 NOTE — LETTER
September 24, 2018     Regi Lyman MD  [address] [facility]    Patient: Fredie Leyden   YOB: 1975   Date of Visit: 9/24/2018       Dear Dr Emery Peer:    Thank you for referring Cisco Ashton to me for evaluation  Below are my notes for this consultation  If you have questions, please do not hesitate to call me  I look forward to following your patient along with you  Sincerely,        Radha Witt MD        CC: No Recipients  Radha Witt MD  9/24/2018 10:06 PM  Sign at close encounter  [address] 37 y o  female MRN: [id number]  DATE: 9/24/2018  Reason for visit:   Chief Complaint   Patient presents with    Follow-up    Chronic Kidney Disease     ASSESSMENT and PLAN:  Chronic kidney disease stage III (baseline serum creatinine 1 3, previous baseline used to be 1 0 )  - Last serum creatinine 1 3 in September 2018 overall stable at baseline  She has overall slow progression of CKD  - She could have underlying CK D likely secondary to long-term hypertension, chronic long-term lithium related nephrotoxicity (on lithium >12 years which can cause chronic interstitial nephritis )  - We'll check BMP before next visit  -previous urinalysis showed no hematuria or proteinuria in 2016  Recent UPC ratio 129 mg, nonsignificant in August 2018  - Recommend to wean patient off lithium if possible   -check renal ultrasound before next visit      Upper normal serum sodium level at 143    -fluid restriction was discontinued during last visit  -one report of 24 hr urine output 4 4 L in April 2018  Would like to get repeat 24 hr intake and output readings  This was communicated with caretaker present along with the patient    They will call back to office with 24 hr urine output and intake readings   -if remains consistently polyuric, will consider urine osmolality to further evaluate any component of diabetes insipidus in the setting of lithium  - Repeat BMP before next visit      Hypertension  -due to patient's developmental disorder, patient does not sit still to be able to check blood pressure accurately  To the best of my ability, her blood pressure was noted to be 116/74  patient is not very cooperative to sit still      - Currently remains on metoprolol  Continue to check blood pressure daily at half-way and advised to call back if blood pressure is persistently greater than 140/90      Low bicarbonate , improved  -bicarb level had dropped to 16 in August 2018 when sodium bicarbonate supplement was increased to two tablets p o  b i d   Since then bicarb level has improved to 21  Continue current regimen  - Questionable metabolic acidosis could be due to Topamax related proximal RTA particularly given hyperchloremia, non-anion gap; urine pH 7 5 after starting sodium bicarbonate supplement on previous evaluation   -if bicarb continues to drop further or requires further increase in sodium bicarb supplement, may need to consider discontinuing Topamax  Diagnoses and all orders for this visit:    Chronic kidney disease (CKD), stage III (moderate)  -     Basic metabolic panel; Future  -     Urinalysis with reflex to microscopic; Future  -     Protein / creatinine ratio, urine; Future  -     Magnesium; Future  -     US retroperitoneal complete; Future    Benign hypertension with CKD (chronic kidney disease) stage III  -     US retroperitoneal complete; Future    Low bicarbonate level  -     Basic metabolic panel; Future    Hypernatremia    Metabolic acidosis        SUBJECTIVE / HPI:  Patient is 56-year-old female with significant past medical history of developmental disorder, mental retardation, occipital injury in the past, history of epilepsy, hypertension for at least 8 years, comes for regular follow-up of renal failure and hypernatremia   Previous baseline creatinine 1 0 going back to 2013 although her creatinine has been in the range of 1 2-1 3 since January 2016  She lives at group home and accompanied by staff from correction in the office today  She is unable to provide any history and remains frequently agitated  Most of the history is obtained from reviewing medical records and talking to the staff person      Last serum creatinine 1 3 overall stable  As per the staff person, blood pressure is overall acceptable at correction  I do not have blood pressure readings available from group home  Unable to comment whether patient has any urinary complaint or not  Patient has been on metoprolol for hypertension  She is also on Topamax, carbamazepine, long-term chronic lithium, buspirone, chlorpromazine, and benztropine   REVIEW OF SYSTEMS:  Unable to review any systems as patient does not answer questions  PHYSICAL EXAM:  Vitals:    09/24/18 1321 09/24/18 1322 09/24/18 1344   BP:   116/74   Resp:  16    Weight:  73 2 kg (161 lb 6 oz)    Height: 5' 4" (1 626 m) 5' 4" (1 626 m)      Body mass index is 27 7 kg/m²  Physical Exam   Constitutional: She appears well-developed and well-nourished  HENT:   Head: Normocephalic and atraumatic  Right Ear: External ear normal    Left Ear: External ear normal    Eyes: Conjunctivae and EOM are normal    Neck: Neck supple  No JVD present  Cardiovascular: Normal rate and normal heart sounds  Pulmonary/Chest: Effort normal and breath sounds normal  She has no wheezes  She has no rales  Abdominal: Soft  Bowel sounds are normal  She exhibits no distension  There is no tenderness  Musculoskeletal: She exhibits no edema or tenderness  Neurological: She is alert  Active, alert, often agitated, does not answer questions appropriately  Skin: Skin is warm and dry  No rash noted  Psychiatric:   Underlying developmental disorder   Vitals reviewed        PAST MEDICAL HISTORY:  Past Medical History:   Diagnosis Date    [facility] )     Encephalopathy     Hypertension     Seizures ([facility] [facility] ) PAST SURGICAL HISTORY:  History reviewed  No pertinent surgical history      SOCIAL HISTORY:  History   Alcohol Use No     History   Drug Use No     History   Smoking Status    Never Smoker   Smokeless Tobacco    Never Used       FAMILY HISTORY:  Family History   Problem Relation Age of Onset    Family history unknown: Yes       MEDICATIONS:    Current Outpatient Prescriptions:     acetaminophen (TYLENOL) 500 mg tablet, Take 500 mg by mouth every 4 (four) hours as needed for mild pain, Disp: , Rfl:     ammonium lactate (LAC-HYDRIN) 12 % cream, Apply 1 application topically 2 (two) times a day, Disp: , Rfl:     B Complex Vitamins (VITAMIN B COMPLEX PO), Take 1 tablet by mouth 2 (two) times a day, Disp: , Rfl:     benztropine (COGENTIN) 1 mg tablet, Take 1 mg by mouth 2 (two) times a day, Disp: , Rfl:     betamethasone dipropionate (DIPROSONE) 0 05 % cream, Apply 1 application topically 2 (two) times a day, Disp: , Rfl:     busPIRone (BUSPAR) 15 mg tablet, Take 15 mg by mouth 2 (two) times a day, Disp: , Rfl:     Calcium Citrate-Vitamin D (CALCIUM + D PO), Take 1 tablet by mouth 2 (two) times a day, Disp: , Rfl:     carBAMazepine (TEGretol) 200 mg tablet, Take 200 mg by mouth 2 (two) times a day, Disp: , Rfl:     chlorhexidine (PERIDEX) 0 12 % solution, Apply 15 mL to the mouth or throat 2 (two) times a day, Disp: , Rfl:     chlorproMAZINE (THORAZINE) 50 mg tablet, Take 50 mg by mouth 3 (three) times a day  , Disp: , Rfl:     clonazePAM (KlonoPIN) 0 5 mg tablet, Take 0 5 mg by mouth 2 (two) times a day, Disp: , Rfl:     fexofenadine (ALLEGRA) 180 MG tablet, Take 180 mg by mouth daily, Disp: , Rfl:     fluticasone (FLONASE) 50 mcg/act nasal spray, 1 spray into each nostril daily, Disp: , Rfl:     lithium carbonate 300 mg capsule, Take 300 mg by mouth 2 (two) times a day with meals, Disp: , Rfl:     LORazepam (ATIVAN) 1 mg tablet, Take 1 tablet by mouth daily at bedtime as needed (increased activity limiting sleep), Disp: 10 tablet, Rfl: 0    medroxyPROGESTERone (DEPO-PROVERA) 150 mg/mL injection, Inject 150 mg into the shoulder, thigh, or buttocks every 3 (three) months, Disp: , Rfl:     metoprolol tartrate (LOPRESSOR) 50 mg tablet, Take 50 mg by mouth 2 (two) times a day, Disp: , Rfl:     montelukast (SINGULAIR) 10 mg tablet, Take 10 mg by mouth daily at bedtime, Disp: , Rfl:     Multiple Vitamins-Minerals (CERTAVITE/ANTIOXIDANTS PO), Take 1 tablet by mouth daily, Disp: , Rfl:     neomycin-bacitracin-polymyxin b (NEOSPORIN) ointment, Apply 1 application topically daily, Disp: , Rfl:     olopatadine HCl (PATADAY) 0 2 % opth drops, Administer 1 drop to both eyes daily, Disp: , Rfl:     Psyllium (METAMUCIL) WAFR, Take 1 Wafer by mouth daily, Disp: , Rfl:     risperiDONE (RisperDAL M-TABS) 2 mg dispersible tablet, Take 3 mg by mouth 3 (three) times a day, Disp: , Rfl:     sodium bicarbonate 650 mg tablet, Take 2 tablets twice a day, Disp: 120 tablet, Rfl: 3    topiramate (TOPAMAX) 100 mg tablet, Take 100 mg by mouth 3 (three) times a day, Disp: , Rfl:     Lab Results:   Results for orders placed or performed in visit on 20/35/73   Basic metabolic panel   Result Value Ref Range    Glucose 93 65 - 99 mg/dL    BUN 20 7 - 25 mg/dL    Creatinine 1 36 (H) 0 50 - 1 10 mg/dL    eGFR Non  47 (L) > OR = 60 mL/min/1 73m2    SL AMB EGFR  54 (L) > OR = 60 mL/min/1 73m2    SL AMB BUN/CREATININE RATIO 15 6 - 22 (calc)    Sodium 143 135 - 146 mmol/L    SL AMB POTASSIUM 3 8 3 5 - 5 3 mmol/L    Chloride 114 (H) 98 - 110 mmol/L    CO2 21 20 - 32 mmol/L    SL AMB CALCIUM 9 3 8 6 - 10 2 mg/dL

## 2018-10-04 DIAGNOSIS — R56.9 SEIZURE (HCC): Primary | ICD-10-CM

## 2018-10-04 RX ORDER — CARBAMAZEPINE 200 MG/1
TABLET, EXTENDED RELEASE ORAL
Qty: 124 TABLET | Refills: 0 | Status: SHIPPED | OUTPATIENT
Start: 2018-10-04 | End: 2018-11-05 | Stop reason: SDUPTHER

## 2018-10-04 RX ORDER — TOPIRAMATE 50 MG/1
TABLET, FILM COATED ORAL
Qty: 186 TABLET | Refills: 0 | Status: SHIPPED | OUTPATIENT
Start: 2018-10-04 | End: 2018-11-05 | Stop reason: SDUPTHER

## 2018-10-09 ENCOUNTER — TELEPHONE (OUTPATIENT)
Dept: NEUROLOGY | Facility: CLINIC | Age: 43
End: 2018-10-09

## 2018-10-16 LAB
CARBAMAZEPINE SERPL-MCNC: 7.6 MG/L (ref 4–12)
TOPIRAMATE SERPL-MCNC: 3.8 MCG/ML

## 2018-11-05 DIAGNOSIS — R56.9 SEIZURE (HCC): ICD-10-CM

## 2018-11-05 RX ORDER — TOPIRAMATE 50 MG/1
TABLET, FILM COATED ORAL
Qty: 180 TABLET | Refills: 1 | Status: SHIPPED | OUTPATIENT
Start: 2018-11-05 | End: 2019-01-10 | Stop reason: SDUPTHER

## 2018-11-05 RX ORDER — CARBAMAZEPINE 200 MG/1
TABLET, EXTENDED RELEASE ORAL
Qty: 120 TABLET | Refills: 1 | Status: SHIPPED | OUTPATIENT
Start: 2018-11-05 | End: 2019-01-10 | Stop reason: SDUPTHER

## 2018-12-31 DIAGNOSIS — E87.2 METABOLIC ACIDOSIS: ICD-10-CM

## 2018-12-31 RX ORDER — SODIUM BICARBONATE 650 MG/1
TABLET ORAL
Qty: 120 TABLET | Refills: 4 | Status: SHIPPED | OUTPATIENT
Start: 2018-12-31 | End: 2019-01-02 | Stop reason: SDUPTHER

## 2019-01-02 DIAGNOSIS — E87.2 METABOLIC ACIDOSIS: ICD-10-CM

## 2019-01-02 RX ORDER — SODIUM BICARBONATE 650 MG/1
TABLET ORAL
Qty: 120 TABLET | Refills: 4 | Status: SHIPPED | OUTPATIENT
Start: 2019-01-02 | End: 2019-06-03 | Stop reason: SDUPTHER

## 2019-01-10 DIAGNOSIS — R56.9 SEIZURE (HCC): ICD-10-CM

## 2019-01-10 RX ORDER — TOPIRAMATE 50 MG/1
100 TABLET, FILM COATED ORAL 3 TIMES DAILY
Qty: 186 TABLET | Refills: 5 | Status: SHIPPED | OUTPATIENT
Start: 2019-01-10 | End: 2019-07-01 | Stop reason: SDUPTHER

## 2019-01-10 RX ORDER — CARBAMAZEPINE 200 MG/1
400 TABLET, EXTENDED RELEASE ORAL 2 TIMES DAILY
Qty: 124 TABLET | Refills: 5 | Status: SHIPPED | OUTPATIENT
Start: 2019-01-10 | End: 2019-07-24 | Stop reason: SDUPTHER

## 2019-02-02 LAB
APPEARANCE UR: CLEAR
BILIRUB UR QL STRIP: NEGATIVE
BUN SERPL-MCNC: 17 MG/DL (ref 7–25)
BUN/CREAT SERPL: 14 (CALC) (ref 6–22)
CALCIUM SERPL-MCNC: 9.3 MG/DL (ref 8.6–10.2)
CHLORIDE SERPL-SCNC: 112 MMOL/L (ref 98–110)
CO2 SERPL-SCNC: 24 MMOL/L (ref 20–32)
COLOR UR: YELLOW
CREAT SERPL-MCNC: 1.23 MG/DL (ref 0.5–1.1)
CREAT UR-MCNC: 29 MG/DL (ref 20–275)
GLUCOSE SERPL-MCNC: 93 MG/DL (ref 65–99)
GLUCOSE UR QL STRIP: NEGATIVE
HGB UR QL STRIP: NEGATIVE
KETONES UR QL STRIP: NEGATIVE
LEUKOCYTE ESTERASE UR QL STRIP: NEGATIVE
MAGNESIUM SERPL-MCNC: 2.1 MG/DL (ref 1.5–2.5)
NITRITE UR QL STRIP: NEGATIVE
PH UR STRIP: 7 [PH] (ref 5–8)
POTASSIUM SERPL-SCNC: 3.8 MMOL/L (ref 3.5–5.3)
PROT UR QL STRIP: NEGATIVE
PROT UR-MCNC: 4 MG/DL (ref 5–24)
PROT/CREAT UR: 138 MG/G CREAT (ref 21–161)
SL AMB EGFR AFRICAN AMERICAN: 61 ML/MIN/1.73M2
SL AMB EGFR NON AFRICAN AMERICAN: 53 ML/MIN/1.73M2
SODIUM SERPL-SCNC: 142 MMOL/L (ref 135–146)
SP GR UR STRIP: 1.01 (ref 1–1.03)

## 2019-02-07 ENCOUNTER — OFFICE VISIT (OUTPATIENT)
Dept: NEPHROLOGY | Facility: CLINIC | Age: 44
End: 2019-02-07
Payer: MEDICARE

## 2019-02-07 VITALS
DIASTOLIC BLOOD PRESSURE: 78 MMHG | SYSTOLIC BLOOD PRESSURE: 130 MMHG | BODY MASS INDEX: 28.79 KG/M2 | WEIGHT: 168.6 LBS | HEIGHT: 64 IN

## 2019-02-07 DIAGNOSIS — I12.9 BENIGN HYPERTENSION WITH CKD (CHRONIC KIDNEY DISEASE) STAGE III (HCC): ICD-10-CM

## 2019-02-07 DIAGNOSIS — R35.89 POLYURIA: ICD-10-CM

## 2019-02-07 DIAGNOSIS — E87.0 HYPERNATREMIA: ICD-10-CM

## 2019-02-07 DIAGNOSIS — N18.30 CHRONIC KIDNEY DISEASE (CKD), STAGE III (MODERATE) (HCC): Primary | ICD-10-CM

## 2019-02-07 DIAGNOSIS — E87.8 LOW BICARBONATE LEVEL: ICD-10-CM

## 2019-02-07 DIAGNOSIS — N18.30 BENIGN HYPERTENSION WITH CKD (CHRONIC KIDNEY DISEASE) STAGE III (HCC): ICD-10-CM

## 2019-02-07 PROCEDURE — 99214 OFFICE O/P EST MOD 30 MIN: CPT | Performed by: INTERNAL MEDICINE

## 2019-02-07 NOTE — PROGRESS NOTES
NEPHROLOGY OUTPATIENT PROGRESS NOTE   Alberto Crocker 37 y o  female MRN: [id number]  DATE: 2/7/2019  Reason for visit:   Chief Complaint   Patient presents with    Follow-up    Chronic Kidney Disease     ASSESSMENT and PLAN:  Chronic kidney disease stage III (baseline serum creatinine 1 3, previous baseline used to be 1 0 )  - Last serum creatinine 1 2 in February 2019 overall stable at baseline     - She could have underlying CK D likely secondary to long-term hypertension, chronic long-term lithium related nephrotoxicity (on lithium >12 years which can cause chronic interstitial nephritis )  - We'll check BMP before next visit  -previous urinalysis showed no hematuria or proteinuria in 2016  Previous UPC ratio 129 mg, nonsignificant in August 2018  - Recommend to wean patient off lithium if possible   -patient has not done renal ultrasound since last visit which she will do before next visit      Serum sodium level overall stable at 142    -she is currently not on any fluid restriction  -one report of 24 hr urine output 4 4 L in April 2018  I had not received repeat 24 hour intake and output readings since last visit  -will check urine osmolality to further evaluate any component of diabetes insipidus in the setting of lithium  - Repeat BMP before next visit   -most recent urinalysis in 2/2019 shows specific gravity 1 006 suggest diluted urine concerning for free water diuresis  -patient is overall stable on multiple psych medications including lithium  I am not sure if patient clinically will be able to weaned off of lithium and in which case, may need to consider adding Amiloride   -will get 24 hour urine output/intake data again     Hypertension  -due to patient's developmental disorder, patient does not sit still to be able to check blood pressure accurately  To the best of my ability, her blood pressure was noted to be 130/78  patient is not very cooperative to sit still      -I have advised caretaker to send her daily blood pressure readings in one week  -Currently remains on metoprolol  Continue to check blood pressure daily at residential and advised to call back if blood pressure is persistently greater than 140/90      Low bicarbonate , improved  -bicarb level improved at 24  Continue sodium bicarbonate two tablets p o  B i d  Continue current regimen  - Questionable metabolic acidosis could be due to Topamax related proximal RTA particularly given hyperchloremia, non-anion gap; urine pH elevated 7-7 5 after starting sodium bicarbonate supplement    Diagnoses and all orders for this visit:    Chronic kidney disease (CKD), stage III (moderate) ([facility])  -     Basic metabolic panel; Future  -     CBC; Future  -     PTH, intact; Future  -     Phosphorus; Future  -     Osmolality, urine; Future  -     Protein / creatinine ratio, urine; Future    Benign hypertension with CKD (chronic kidney disease) stage III ([facility])    Hypernatremia  -     Osmolality, urine; Future    Low bicarbonate level  -     Basic metabolic panel; Future        SUBJECTIVE / HPI:  Patient is 23-year-old female with significant past medical history of developmental disorder, mental retardation, occipital injury in the past, history of epilepsy, hypertension for at least 8 years, comes for regular follow-up of renal failure and hypernatremia  Previous baseline creatinine 1 0 going back to 2013 although her creatinine has been in the range of 1 2-1 3 since January 2016  She lives at group home and accompanied by staff from residential in the office today  She is unable to provide any history and remains frequently agitated  Most of the history is obtained from reviewing medical records and talking to the staff person      Last serum creatinine 1 3 overall stable  As per the staff person, blood pressure is overall acceptable at residential  I do not have blood pressure readings available from group home   Unable to comment whether patient has any urinary complaint or not  Patient has been on metoprolol for hypertension  She is also on Topamax, carbamazepine, long-term chronic lithium, buspirone, chlorpromazine, and benztropine     REVIEW OF SYSTEMS:  More than 10 point review of systems were obtained and discussed in length with the patient  Complete review of systems were negative / unremarkable except mentioned above  PHYSICAL EXAM:  Vitals:    02/07/19 1038 02/07/19 1100   BP:  130/78   Weight: 76 5 kg (168 lb 9 6 oz)    Height: 5' 4" (1 626 m)      Body mass index is 28 94 kg/m²  Physical Exam   Constitutional: She is oriented to person, place, and time  She appears well-developed and well-nourished  HENT:   Head: Normocephalic and atraumatic  Right Ear: External ear normal    Left Ear: External ear normal    Eyes: Pupils are equal, round, and reactive to light  Conjunctivae and EOM are normal    Neck: Neck supple  No JVD present  Cardiovascular: Normal rate and normal heart sounds  Pulmonary/Chest: Effort normal and breath sounds normal  She has no wheezes  She has no rales  Abdominal: Soft  Bowel sounds are normal  She exhibits no distension  There is no tenderness  Musculoskeletal: She exhibits no edema or tenderness  Neurological: She is alert and oriented to person, place, and time  Skin: Skin is warm and dry  No rash noted  Psychiatric: She has a normal mood and affect  Her behavior is normal    Vitals reviewed  PAST MEDICAL HISTORY:  Past Medical History:   Diagnosis Date    [facility]     Encephalopathy     Hypertension     [facility]        PAST SURGICAL HISTORY:  History reviewed  No pertinent surgical history      SOCIAL HISTORY:  History   Alcohol Use No     History   Drug Use No     History   Smoking Status    Never Smoker   Smokeless Tobacco    Never Used       FAMILY HISTORY:  Family History   Problem Relation Age of Onset    Family history unknown: Yes       MEDICATIONS:    Current Outpatient Prescriptions:     acetaminophen (TYLENOL) 500 mg tablet, Take 500 mg by mouth every 4 (four) hours as needed for mild pain, Disp: , Rfl:     ammonium lactate (LAC-HYDRIN) 12 % cream, Apply 1 application topically 2 (two) times a day, Disp: , Rfl:     B Complex Vitamins (VITAMIN B COMPLEX PO), Take 1 tablet by mouth 2 (two) times a day, Disp: , Rfl:     benztropine (COGENTIN) 1 mg tablet, Take 1 mg by mouth 2 (two) times a day, Disp: , Rfl:     betamethasone dipropionate (DIPROSONE) 0 05 % cream, Apply 1 application topically 2 (two) times a day, Disp: , Rfl:     busPIRone (BUSPAR) 15 mg tablet, Take 15 mg by mouth 2 (two) times a day, Disp: , Rfl:     Calcium Citrate-Vitamin D (CALCIUM + D PO), Take 1 tablet by mouth 2 (two) times a day, Disp: , Rfl:     carBAMazepine (TEGretol XR) 200 mg 12 hr tablet, Take 2 tablets (400 mg total) by mouth 2 (two) times a day, Disp: 124 tablet, Rfl: 5    carBAMazepine (TEGretol) 200 mg tablet, Take 200 mg by mouth 2 (two) times a day, Disp: , Rfl:     chlorhexidine (PERIDEX) 0 12 % solution, Apply 15 mL to the mouth or throat 2 (two) times a day, Disp: , Rfl:     chlorproMAZINE (THORAZINE) 50 mg tablet, Take 50 mg by mouth 3 (three) times a day  , Disp: , Rfl:     clonazePAM (KlonoPIN) 0 5 mg tablet, Take 0 5 mg by mouth 2 (two) times a day, Disp: , Rfl:     fexofenadine (ALLEGRA) 180 MG tablet, Take 180 mg by mouth daily, Disp: , Rfl:     fluticasone (FLONASE) 50 mcg/act nasal spray, 1 spray into each nostril daily, Disp: , Rfl:     lithium carbonate 300 mg capsule, Take 300 mg by mouth 2 (two) times a day with meals, Disp: , Rfl:     LORazepam (ATIVAN) 1 mg tablet, Take 1 tablet by mouth daily at bedtime as needed (increased activity limiting sleep), Disp: 10 tablet, Rfl: 0    medroxyPROGESTERone (DEPO-PROVERA) 150 mg/mL injection, Inject 150 mg into the shoulder, thigh, or buttocks every 3 (three) months, Disp: , Rfl:     metoprolol tartrate (LOPRESSOR) 50 mg tablet, Take 50 mg by mouth 2 (two) times a day, Disp: , Rfl:     montelukast (SINGULAIR) 10 mg tablet, Take 10 mg by mouth daily at bedtime, Disp: , Rfl:     Multiple Vitamins-Minerals (CERTAVITE/ANTIOXIDANTS PO), Take 1 tablet by mouth daily, Disp: , Rfl:     neomycin-bacitracin-polymyxin b (NEOSPORIN) ointment, Apply 1 application topically daily, Disp: , Rfl:     olopatadine HCl (PATADAY) 0 2 % opth drops, Administer 1 drop to both eyes daily, Disp: , Rfl:     Psyllium (METAMUCIL) WAFR, Take 1 Wafer by mouth daily, Disp: , Rfl:     risperiDONE (RisperDAL M-TABS) 2 mg dispersible tablet, Take 3 mg by mouth 3 (three) times a day, Disp: , Rfl:     sodium bicarbonate 650 mg tablet, TAKE 2 TABS (1300MG) BY MOUTH TWICE DAILY @ 8AM-8PM (SUPP) *SAMUEL, Disp: 120 tablet, Rfl: 4    topiramate (TOPAMAX) 50 MG tablet, Take 2 tablets (100 mg total) by mouth 3 (three) times a day, Disp: 186 tablet, Rfl: 5    topiramate (TOPAMAX) 100 mg tablet, Take 100 mg by mouth 3 (three) times a day, Disp: , Rfl:     Lab Results:   Results for orders placed or performed in visit on 02/01/19   Magnesium   Result Value Ref Range    Magnesium, Serum 2 1 1 5 - 2 5 mg/dL   Basic metabolic panel   Result Value Ref Range    Glucose, Random 93 65 - 99 mg/dL    BUN 17 7 - 25 mg/dL    Creatinine 1 23 (H) 0 50 - 1 10 mg/dL    eGFR Non  53 (L) > OR = 60 mL/min/1 73m2    eGFR  61 > OR = 60 mL/min/1 73m2    SL AMB BUN/CREATININE RATIO 14 6 - 22 (calc)    Sodium 142 135 - 146 mmol/L    Potassium 3 8 3 5 - 5 3 mmol/L    Chloride 112 (H) 98 - 110 mmol/L    CO2 24 20 - 32 mmol/L    SL AMB CALCIUM 9 3 8 6 - 10 2 mg/dL   Protein, Total w/Creat, Random Urine   Result Value Ref Range    Creatinine, Urine 29 20 - 275 mg/dL    Protein/Creat Ratio 138 21 - 161 mg/g creat    Total Protein, Urine 4 (L) 5 - 24 mg/dL   Urinalysis with reflex to microscopic   Result Value Ref Range    Color UA YELLOW YELLOW Urine Appearance CLEAR CLEAR    Specific Gravity 1 006 1 001 - 1 035    Ph 7 0 5 0 - 8 0    Glucose, Urine NEGATIVE NEGATIVE    Bilirubin, Urine NEGATIVE NEGATIVE    Ketone, Urine NEGATIVE NEGATIVE    Blood, Urine NEGATIVE NEGATIVE    Protein, Urine NEGATIVE NEGATIVE    SL AMB NITRITES URINE, QUAL   NEGATIVE NEGATIVE    Leukocyte Esterase NEGATIVE NEGATIVE

## 2019-02-07 NOTE — LETTER
February 7, 2019     MD Mitzy Gomes 1    Patient: Godfrey Clayton   YOB: 1975   Date of Visit: 2/7/2019       Dear Dr Osmin Cuevas:    Thank you for referring Verna Lombardi to me for evaluation  Below are my notes for this consultation  If you have questions, please do not hesitate to call me  I look forward to following your patient along with you  Sincerely,        Katie Enrique MD        CC: No Recipients  Katie Enrique MD  2/7/2019  1:09 PM  Sign at close encounter  [address] 37 y o  female MRN: [id number]  DATE: 2/7/2019  Reason for visit:   Chief Complaint   Patient presents with    Follow-up    Chronic Kidney Disease     ASSESSMENT and PLAN:  Chronic kidney disease stage III (baseline serum creatinine 1 3, previous baseline used to be 1 0 )  - Last serum creatinine 1 2 in February 2019 overall stable at baseline     - She could have underlying CK D likely secondary to long-term hypertension, chronic long-term lithium related nephrotoxicity (on lithium >12 years which can cause chronic interstitial nephritis )  - We'll check BMP before next visit  -previous urinalysis showed no hematuria or proteinuria in 2016  Previous UPC ratio 129 mg, nonsignificant in August 2018  - Recommend to wean patient off lithium if possible   -patient has not done renal ultrasound since last visit which she will do before next visit      Serum sodium level overall stable at 142    -she is currently not on any fluid restriction  -one report of 24 hr urine output 4 4 L in April 2018  I had not received repeat 24 hour intake and output readings since last visit    -will check urine osmolality to further evaluate any component of diabetes insipidus in the setting of lithium  - Repeat BMP before next visit   -most recent urinalysis in 2/2019 shows specific gravity 1 006 suggest diluted urine concerning for free water diuresis  -patient is overall stable on multiple psych medications including lithium  I am not sure if patient clinically will be able to weaned off of lithium and in which case, may need to consider adding Amiloride   -will get 24 hour urine output/intake data again     Hypertension  -due to patient's developmental disorder, patient does not sit still to be able to check blood pressure accurately  To the best of my ability, her blood pressure was noted to be 130/78  patient is not very cooperative to sit still      -I have advised caretaker to send her daily blood pressure readings in one week  -Currently remains on metoprolol  Continue to check blood pressure daily at care home and advised to call back if blood pressure is persistently greater than 140/90      Low bicarbonate , improved  -bicarb level improved at 24  Continue sodium bicarbonate two tablets p o  B i d  Continue current regimen  - Questionable metabolic acidosis could be due to Topamax related proximal RTA particularly given hyperchloremia, non-anion gap; urine pH elevated 7-7 5 after starting sodium bicarbonate supplement    Diagnoses and all orders for this visit:    Chronic kidney disease (CKD), stage III (moderate) (HCC)  -     Basic metabolic panel; Future  -     CBC; Future  -     PTH, intact; Future  -     Phosphorus; Future  -     Osmolality, urine; Future  -     Protein / creatinine ratio, urine; Future    Benign hypertension with CKD (chronic kidney disease) stage III (HCC)    Hypernatremia  -     Osmolality, urine; Future    Low bicarbonate level  -     Basic metabolic panel; Future        SUBJECTIVE / HPI:  Patient is 33-year-old female with significant past medical history of developmental disorder, mental retardation, occipital injury in the past, history of epilepsy, hypertension for at least 8 years, comes for regular follow-up of renal failure and hypernatremia   Previous baseline creatinine 1 0 going back to 2013 although her creatinine has been in the range of 1 2-1 3 since January 2016  She lives at group home and accompanied by staff from [facility] in the office today  She is unable to provide any history and remains frequently agitated  Most of the history is obtained from reviewing medical records and talking to the staff person      Last serum creatinine 1 3 overall stable  As per the staff person, blood pressure is overall acceptable at [facility]  I do not have blood pressure readings available from group home  Unable to comment whether patient has any urinary complaint or not  Patient has been on metoprolol for hypertension  She is also on Topamax, carbamazepine, long-term chronic lithium, buspirone, chlorpromazine, and benztropine     REVIEW OF SYSTEMS:  More than 10 point review of systems were obtained and discussed in length with the patient  Complete review of systems were negative / unremarkable except mentioned above  PHYSICAL EXAM:  Vitals:    02/07/19 1038 02/07/19 1100   BP:  130/78   Weight: 76 5 kg (168 lb 9 6 oz)    Height: 5' 4" (1 626 m)      Body mass index is 28 94 kg/m²  Physical Exam   Constitutional: She is oriented to person, place, and time  She appears well-developed and well-nourished  HENT:   Head: Normocephalic and atraumatic  Right Ear: External ear normal    Left Ear: External ear normal    Eyes: Pupils are equal, round, and reactive to light  Conjunctivae and EOM are normal    Neck: Neck supple  No JVD present  Cardiovascular: Normal rate and normal heart sounds  Pulmonary/Chest: Effort normal and breath sounds normal  She has no wheezes  She has no rales  Abdominal: Soft  Bowel sounds are normal  She exhibits no distension  There is no tenderness  Musculoskeletal: She exhibits no edema or tenderness  Neurological: She is alert and oriented to person, place, and time  Skin: Skin is warm and dry  No rash noted  Psychiatric: She has a normal mood and affect   Her behavior is normal    Vitals reviewed  PAST MEDICAL HISTORY:  Past Medical History:   Diagnosis Date    Brain injury ([facility] [address] )     Encephalopathy     Hypertension     Seizures ([facility] [address] )        PAST SURGICAL HISTORY:  History reviewed  No pertinent surgical history      SOCIAL HISTORY:  History   Alcohol Use No     History   Drug Use No     History   Smoking Status    Never Smoker   Smokeless Tobacco    Never Used       FAMILY HISTORY:  Family History   Problem Relation Age of Onset    Family history unknown: Yes       MEDICATIONS:    Current Outpatient Prescriptions:     acetaminophen (TYLENOL) 500 mg tablet, Take 500 mg by mouth every 4 (four) hours as needed for mild pain, Disp: , Rfl:     ammonium lactate (LAC-HYDRIN) 12 % cream, Apply 1 application topically 2 (two) times a day, Disp: , Rfl:     B Complex Vitamins (VITAMIN B COMPLEX PO), Take 1 tablet by mouth 2 (two) times a day, Disp: , Rfl:     benztropine (COGENTIN) 1 mg tablet, Take 1 mg by mouth 2 (two) times a day, Disp: , Rfl:     betamethasone dipropionate (DIPROSONE) 0 05 % cream, Apply 1 application topically 2 (two) times a day, Disp: , Rfl:     busPIRone (BUSPAR) 15 mg tablet, Take 15 mg by mouth 2 (two) times a day, Disp: , Rfl:     Calcium Citrate-Vitamin D (CALCIUM + D PO), Take 1 tablet by mouth 2 (two) times a day, Disp: , Rfl:     carBAMazepine (TEGretol XR) 200 mg 12 hr tablet, Take 2 tablets (400 mg total) by mouth 2 (two) times a day, Disp: 124 tablet, Rfl: 5    carBAMazepine (TEGretol) 200 mg tablet, Take 200 mg by mouth 2 (two) times a day, Disp: , Rfl:     chlorhexidine (PERIDEX) 0 12 % solution, Apply 15 mL to the mouth or throat 2 (two) times a day, Disp: , Rfl:     chlorproMAZINE (THORAZINE) 50 mg tablet, Take 50 mg by mouth 3 (three) times a day  , Disp: , Rfl:     clonazePAM (KlonoPIN) 0 5 mg tablet, Take 0 5 mg by mouth 2 (two) times a day, Disp: , Rfl:     fexofenadine (ALLEGRA) 180 MG tablet, Take 180 mg by mouth daily, Disp: , Rfl:     fluticasone (FLONASE) 50 mcg/act nasal spray, 1 spray into each nostril daily, Disp: , Rfl:     lithium carbonate 300 mg capsule, Take 300 mg by mouth 2 (two) times a day with meals, Disp: , Rfl:     LORazepam (ATIVAN) 1 mg tablet, Take 1 tablet by mouth daily at bedtime as needed (increased activity limiting sleep), Disp: 10 tablet, Rfl: 0    medroxyPROGESTERone (DEPO-PROVERA) 150 mg/mL injection, Inject 150 mg into the shoulder, thigh, or buttocks every 3 (three) months, Disp: , Rfl:     metoprolol tartrate (LOPRESSOR) 50 mg tablet, Take 50 mg by mouth 2 (two) times a day, Disp: , Rfl:     montelukast (SINGULAIR) 10 mg tablet, Take 10 mg by mouth daily at bedtime, Disp: , Rfl:     Multiple Vitamins-Minerals (CERTAVITE/ANTIOXIDANTS PO), Take 1 tablet by mouth daily, Disp: , Rfl:     neomycin-bacitracin-polymyxin b (NEOSPORIN) ointment, Apply 1 application topically daily, Disp: , Rfl:     olopatadine HCl (PATADAY) 0 2 % opth drops, Administer 1 drop to both eyes daily, Disp: , Rfl:     Psyllium (METAMUCIL) WAFR, Take 1 Wafer by mouth daily, Disp: , Rfl:     risperiDONE (RisperDAL M-TABS) 2 mg dispersible tablet, Take 3 mg by mouth 3 (three) times a day, Disp: , Rfl:     sodium bicarbonate 650 mg tablet, TAKE 2 TABS (1300MG) BY MOUTH TWICE DAILY @ 8AM-8PM (SUPP) *SAMUEL, Disp: 120 tablet, Rfl: 4    topiramate (TOPAMAX) 50 MG tablet, Take 2 tablets (100 mg total) by mouth 3 (three) times a day, Disp: 186 tablet, Rfl: 5    topiramate (TOPAMAX) 100 mg tablet, Take 100 mg by mouth 3 (three) times a day, Disp: , Rfl:     Lab Results:   Results for orders placed or performed in visit on 02/01/19   Magnesium   Result Value Ref Range    Magnesium, Serum 2 1 1 5 - 2 5 mg/dL   Basic metabolic panel   Result Value Ref Range    Glucose, Random 93 65 - 99 mg/dL    BUN 17 7 - 25 mg/dL    Creatinine 1 23 (H) 0 50 - 1 10 mg/dL    eGFR Non  53 (L) > OR = 60 mL/min/1 73m2    eGFR  American 61 > OR = 60 mL/min/1 73m2    SL AMB BUN/CREATININE RATIO 14 6 - 22 (calc)    Sodium 142 135 - 146 mmol/L    Potassium 3 8 3 5 - 5 3 mmol/L    Chloride 112 (H) 98 - 110 mmol/L    CO2 24 20 - 32 mmol/L    SL AMB CALCIUM 9 3 8 6 - 10 2 mg/dL   Protein, Total w/Creat, Random Urine   Result Value Ref Range    Creatinine, Urine 29 20 - 275 mg/dL    Protein/Creat Ratio 138 21 - 161 mg/g creat    Total Protein, Urine 4 (L) 5 - 24 mg/dL   Urinalysis with reflex to microscopic   Result Value Ref Range    Color UA YELLOW YELLOW    Urine Appearance CLEAR CLEAR    Specific Gravity 1 006 1 001 - 1 035    Ph 7 0 5 0 - 8 0    Glucose, Urine NEGATIVE NEGATIVE    Bilirubin, Urine NEGATIVE NEGATIVE    Ketone, Urine NEGATIVE NEGATIVE    Blood, Urine NEGATIVE NEGATIVE    Protein, Urine NEGATIVE NEGATIVE    SL AMB NITRITES URINE, QUAL   NEGATIVE NEGATIVE    Leukocyte Esterase NEGATIVE NEGATIVE

## 2019-02-18 ENCOUNTER — HOSPITAL ENCOUNTER (OUTPATIENT)
Dept: ULTRASOUND IMAGING | Facility: HOSPITAL | Age: 44
Discharge: HOME | End: 2019-02-18
Attending: INTERNAL MEDICINE
Payer: MEDICARE

## 2019-02-18 DIAGNOSIS — I12.9 BENIGN HYPERTENSION WITH CKD (CHRONIC KIDNEY DISEASE) STAGE III (HCC): ICD-10-CM

## 2019-02-18 DIAGNOSIS — N18.30 CHRONIC KIDNEY DISEASE (CKD), STAGE III (MODERATE) (HCC): ICD-10-CM

## 2019-02-18 DIAGNOSIS — N18.30 BENIGN HYPERTENSION WITH CKD (CHRONIC KIDNEY DISEASE) STAGE III (HCC): ICD-10-CM

## 2019-02-18 PROCEDURE — 76770 US EXAM ABDO BACK WALL COMP: CPT

## 2019-02-21 ENCOUNTER — TELEPHONE (OUTPATIENT)
Dept: NEPHROLOGY | Facility: CLINIC | Age: 44
End: 2019-02-21

## 2019-02-21 NOTE — TELEPHONE ENCOUNTER
Can you please let her group home nurse know that renal ultrasound is overall okay and does not require any urgent intervention  I also need 24 hour intake and output data from group home  During last office visit they said they will be sending it to us although I have not received anything  Can you please confirm this with them and see if they can send us 24 hour intake and urine output data

## 2019-02-22 LAB — OSMOLALITY UR: 239 MOSM/KG (ref 50–1200)

## 2019-02-22 NOTE — TELEPHONE ENCOUNTER
Spoke with Robbie King from the Group home and she states that she is aware of the US results and she states she is sure that they have logged her input and output and she will fax it to us  I did inform her that if they had not recorded the data to please do so and then fax us the results

## 2019-05-29 LAB
BASOPHILS # BLD AUTO: 39 CELLS/UL (ref 0–200)
BASOPHILS NFR BLD AUTO: 0.8 %
BUN SERPL-MCNC: 19 MG/DL (ref 7–25)
BUN/CREAT SERPL: 14 (CALC) (ref 6–22)
CALCIUM SERPL-MCNC: 9.2 MG/DL (ref 8.6–10.2)
CALCIUM SERPL-MCNC: 9.2 MG/DL (ref 8.6–10.2)
CHLORIDE SERPL-SCNC: 111 MMOL/L (ref 98–110)
CO2 SERPL-SCNC: 25 MMOL/L (ref 20–32)
CREAT SERPL-MCNC: 1.36 MG/DL (ref 0.5–1.1)
CREAT UR-MCNC: 27 MG/DL (ref 20–275)
EOSINOPHIL # BLD AUTO: 147 CELLS/UL (ref 15–500)
EOSINOPHIL NFR BLD AUTO: 3 %
ERYTHROCYTE [DISTWIDTH] IN BLOOD BY AUTOMATED COUNT: 12 % (ref 11–15)
GLUCOSE SERPL-MCNC: 89 MG/DL (ref 65–99)
HCT VFR BLD AUTO: 37.5 % (ref 35–45)
HGB BLD-MCNC: 12.5 G/DL (ref 11.7–15.5)
LYMPHOCYTES # BLD AUTO: 1661 CELLS/UL (ref 850–3900)
LYMPHOCYTES NFR BLD AUTO: 33.9 %
MCH RBC QN AUTO: 31.3 PG (ref 27–33)
MCHC RBC AUTO-ENTMCNC: 33.3 G/DL (ref 32–36)
MCV RBC AUTO: 93.8 FL (ref 80–100)
MONOCYTES # BLD AUTO: 417 CELLS/UL (ref 200–950)
MONOCYTES NFR BLD AUTO: 8.5 %
NEUTROPHILS # BLD AUTO: 2636 CELLS/UL (ref 1500–7800)
NEUTROPHILS NFR BLD AUTO: 53.8 %
PHOSPHATE SERPL-MCNC: 3.5 MG/DL (ref 2.5–4.5)
PLATELET # BLD AUTO: 277 THOUSAND/UL (ref 140–400)
PMV BLD REES-ECKER: 9.7 FL (ref 7.5–12.5)
POTASSIUM SERPL-SCNC: 3.7 MMOL/L (ref 3.5–5.3)
PROT UR-MCNC: 6 MG/DL (ref 5–24)
PROT/CREAT UR: 222 MG/G CREAT (ref 21–161)
PTH-INTACT SERPL-MCNC: 27 PG/ML (ref 14–64)
RBC # BLD AUTO: 4 MILLION/UL (ref 3.8–5.1)
SL AMB EGFR AFRICAN AMERICAN: 54 ML/MIN/1.73M2
SL AMB EGFR NON AFRICAN AMERICAN: 47 ML/MIN/1.73M2
SODIUM SERPL-SCNC: 141 MMOL/L (ref 135–146)
WBC # BLD AUTO: 4.9 THOUSAND/UL (ref 3.8–10.8)

## 2019-06-03 ENCOUNTER — TELEPHONE (OUTPATIENT)
Dept: NEPHROLOGY | Facility: CLINIC | Age: 44
End: 2019-06-03

## 2019-06-03 DIAGNOSIS — E87.2 METABOLIC ACIDOSIS: ICD-10-CM

## 2019-06-03 RX ORDER — SODIUM BICARBONATE 650 MG/1
650 TABLET ORAL 2 TIMES DAILY
Qty: 120 TABLET | Refills: 2 | Status: SHIPPED | OUTPATIENT
Start: 2019-06-03 | End: 2019-12-16

## 2019-06-04 RX ORDER — SODIUM BICARBONATE 650 MG/1
TABLET ORAL
Qty: 120 TABLET | Refills: 4 | Status: SHIPPED | OUTPATIENT
Start: 2019-06-04 | End: 2019-11-05 | Stop reason: SDUPTHER

## 2019-06-20 ENCOUNTER — OFFICE VISIT (OUTPATIENT)
Dept: URGENT CARE | Facility: CLINIC | Age: 44
End: 2019-06-20
Payer: MEDICARE

## 2019-06-20 VITALS
BODY MASS INDEX: 24.42 KG/M2 | OXYGEN SATURATION: 97 % | SYSTOLIC BLOOD PRESSURE: 136 MMHG | HEART RATE: 96 BPM | TEMPERATURE: 98.2 F | WEIGHT: 170.6 LBS | RESPIRATION RATE: 22 BRPM | DIASTOLIC BLOOD PRESSURE: 80 MMHG | HEIGHT: 70 IN

## 2019-06-20 DIAGNOSIS — S00.81XA ABRASION, FACE W/O INFECTION: Primary | ICD-10-CM

## 2019-06-20 PROCEDURE — G0463 HOSPITAL OUTPT CLINIC VISIT: HCPCS | Performed by: NURSE PRACTITIONER

## 2019-06-20 PROCEDURE — 99203 OFFICE O/P NEW LOW 30 MIN: CPT | Performed by: NURSE PRACTITIONER

## 2019-06-20 RX ORDER — IBUPROFEN 200 MG
TABLET ORAL 3 TIMES DAILY
Qty: 28.3 G | Refills: 0 | Status: SHIPPED | OUTPATIENT
Start: 2019-06-20

## 2019-07-01 DIAGNOSIS — R56.9 SEIZURE (HCC): ICD-10-CM

## 2019-07-01 RX ORDER — TOPIRAMATE 50 MG/1
TABLET, FILM COATED ORAL
Qty: 186 TABLET | Refills: 0 | Status: SHIPPED | OUTPATIENT
Start: 2019-07-01 | End: 2019-07-24 | Stop reason: SDUPTHER

## 2019-07-02 ENCOUNTER — OFFICE VISIT (OUTPATIENT)
Dept: NEPHROLOGY | Facility: CLINIC | Age: 44
End: 2019-07-02
Payer: MEDICARE

## 2019-07-02 VITALS
HEIGHT: 64 IN | RESPIRATION RATE: 16 BRPM | WEIGHT: 172.4 LBS | SYSTOLIC BLOOD PRESSURE: 136 MMHG | DIASTOLIC BLOOD PRESSURE: 76 MMHG | HEART RATE: 80 BPM | BODY MASS INDEX: 29.43 KG/M2

## 2019-07-02 DIAGNOSIS — E87.8 LOW BICARBONATE LEVEL: ICD-10-CM

## 2019-07-02 DIAGNOSIS — N18.30 CHRONIC KIDNEY DISEASE (CKD), STAGE III (MODERATE) (HCC): Primary | ICD-10-CM

## 2019-07-02 DIAGNOSIS — N18.30 BENIGN HYPERTENSION WITH CKD (CHRONIC KIDNEY DISEASE) STAGE III (HCC): ICD-10-CM

## 2019-07-02 DIAGNOSIS — E87.0 HYPERNATREMIA: ICD-10-CM

## 2019-07-02 DIAGNOSIS — I12.9 BENIGN HYPERTENSION WITH CKD (CHRONIC KIDNEY DISEASE) STAGE III (HCC): ICD-10-CM

## 2019-07-02 PROCEDURE — 99214 OFFICE O/P EST MOD 30 MIN: CPT | Performed by: INTERNAL MEDICINE

## 2019-07-02 NOTE — PROGRESS NOTES
NEPHROLOGY OUTPATIENT PROGRESS NOTE   Angelique Ambrosio 53 y o  female MRN: [id number]  DATE: 7/2/2019  Reason for visit:   Chief Complaint   Patient presents with    Follow-up    Chronic Kidney Disease     ASSESSMENT and PLAN:  Chronic kidney disease stage III (baseline serum creatinine 1 3, previous baseline used to be 1 0 )  - Last serum creatinine 1 3 in May 2019 overall stable     - She could have underlying CK D likely secondary to long-term hypertension, chronic long-term lithium related nephrotoxicity (on lithium >12 years which can cause chronic interstitial nephritis )  - We'll check BMP before next visit  -urinalysis in February 2019 bland without hematuria or proteinuria  - Recommend to wean patient off lithium if possible; will discuss with patient's psychiatrist (Preventive measures - [phone])  -renal ultrasound in February 2019 shows right kidney 10 2 cm, left kidney 9 9 cm, echogenic appearance of right kidney, no hydronephrosis, echogenic appearance in the left kidney,     Serum sodium level overall stable at 141    -she is currently not on any fluid restriction   -most recent 24 hour intake and output from February 2019 reviewed which shows 3 4 L intake with urine output 2 8 L  Based on this data and assuming that this is accurately collected, she does not have significant polyuria   -last urine osmolality 239 in February 2019   - Repeat BMP, urine osmolality before next visit   -most recent urinalysis in 2/2019 shows specific gravity 1 006 suggest diluted urine  -patient is overall stable on multiple psych medications including lithium  I am not sure if patient clinically will be able to weaned off of lithium and in which case, may need to consider adding Amiloride if she has ongoing polyuria in the future or has worsening hypernatremia   -would like to evaluate 24 hour intake and urine output data again before next office visit      Concern for medullary nephrocalcinosis on renal ultrasound  -no obvious recent episodes of kidney stone flare-up   -she has adequate urine volume based on 24 hour urine output data      Hypertension  -due to patient's developmental disorder, patient does not sit still to be able to check blood pressure accurately   To the best of my ability, her blood pressure was noted to be 136/76  patient is not very cooperative to sit still      -Her BP at group home are generally 120s to 140s/70s as per caregiver   -Currently remains on metoprolol  Mild proteinuria, last UPC ratio 222 mg  Continue to closely monitor  If worsening, may need to consider ACE-inhibitor      Low bicarbonate , improved  -bicarb level improved at 25  Continue sodium bicarbonate 1 tablets p o  B i d  Continue current regimen   - Questionable metabolic acidosis could be due to Topamax related proximal RTA particularly given hyperchloremia, non-anion gap; urine pH elevated 7 with ongoing sodium bicarbonate supplement    Discussed with Dr Shane Peter (Psychiatry) regarding her ongoing lithium use  He will be re-evaluating all her medications during her office visit and will touch base with us  I have provided office and fax number  Diagnoses and all orders for this visit:    Chronic kidney disease (CKD), stage III (moderate) (HCC)  -     Basic metabolic panel; Future  -     Magnesium; Future  -     Phosphorus; Future  -     PTH, intact; Future  -     Protein / creatinine ratio, urine; Future  -     Vitamin D 25 hydroxy; Future  -     Basic metabolic panel  -     Magnesium  -     Phosphorus  -     PTH, intact  -     Vitamin D 25 hydroxy    Benign hypertension with CKD (chronic kidney disease) stage III (HCC)  -     Protein / creatinine ratio, urine; Future    Hypernatremia  -     Basic metabolic panel; Future  -     Osmolality, urine; Future  -     Sodium, urine, random;  Future  -     Basic metabolic panel  -     Osmolality, urine  -     Sodium, urine, random    Low bicarbonate level  -     Basic metabolic panel; Future  -     Basic metabolic panel          SUBJECTIVE / HPI:  Patient is 24-year-old female with significant past medical history of developmental disorder, mental retardation, occipital injury in the past, history of epilepsy, hypertension for at least 8 years, comes for regular follow-up of renal failure and hypernatremia  Previous baseline creatinine 1 0 going back to 2013 although her creatinine has been in the range of 1 2-1 3 since January 2016  Most recent serum creatinine 1 3 in May 2019  She lives at [facility] and accompanied by staff from [facility] in the office today  She is unable to provide any history and remains frequently agitated  Most of the history is obtained from reviewing medical records and talking to the staff person      As per the staff person, blood pressure is overall acceptable at [facility]   I do not have blood pressure readings available from group home  Unable to comment whether patient has any urinary complaint or not  Patient has been on metoprolol for hypertension  She is also on Topamax, carbamazepine, long-term chronic lithium, buspirone, chlorpromazine, and benztropine     REVIEW OF SYSTEMS:  Unable to obtain any detailed review of system as patient has underlying developmental disorder, mental retardation  No other pertinent positive findings other than mentioned above in the note  PHYSICAL EXAM:  Vitals:    07/02/19 1107 07/02/19 1123   BP: 140/98 136/76   BP Location: Right arm    Patient Position: Sitting    Cuff Size: Standard    Pulse: 80    Resp: 16    Weight: 78 2 kg (172 lb 6 4 oz)    Height: 5' 3 5" (1 613 m)      Body mass index is 30 06 kg/m²  Physical Exam   Constitutional: She appears well-developed and well-nourished  HENT:   Head: Normocephalic and atraumatic  Right Ear: External ear normal    Left Ear: External ear normal    Eyes: Pupils are equal, round, and reactive to light   Conjunctivae and EOM are normal    Neck: Neck supple  No JVD present  Cardiovascular: Normal rate and normal heart sounds  Pulmonary/Chest: Effort normal and breath sounds normal  She has no wheezes  She has no rales  Abdominal: Soft  Bowel sounds are normal  She exhibits no distension  There is no tenderness  Musculoskeletal: She exhibits no edema or tenderness  Neurological: She is alert  Underlying developmental disorder, mental retardation  Does not provide any detailed history or does not answer questions appropriately  Skin: Skin is warm and dry  No rash noted  Psychiatric:   Underlying agitations   Vitals reviewed  PAST MEDICAL HISTORY:  Past Medical History:   Diagnosis Date    [facility]     Encephalopathy     Hypertension     [facility]        PAST SURGICAL HISTORY:  History reviewed  No pertinent surgical history      SOCIAL HISTORY:  Social History     Substance and Sexual Activity   Alcohol Use No     Social History     Substance and Sexual Activity   Drug Use No     Social History     Tobacco Use   Smoking Status Never Smoker   Smokeless Tobacco Never Used       FAMILY HISTORY:  Family History   Family history unknown: Yes       MEDICATIONS:    Current Outpatient Medications:     acetaminophen (TYLENOL) 500 mg tablet, Take 500 mg by mouth every 4 (four) hours as needed for mild pain, Disp: , Rfl:     ammonium lactate (LAC-HYDRIN) 12 % cream, Apply 1 application topically 2 (two) times a day, Disp: , Rfl:     B Complex Vitamins (VITAMIN B COMPLEX PO), Take 1 tablet by mouth 2 (two) times a day, Disp: , Rfl:     benztropine (COGENTIN) 1 mg tablet, Take 1 mg by mouth 2 (two) times a day, Disp: , Rfl:     betamethasone dipropionate (DIPROSONE) 0 05 % cream, Apply 1 application topically 2 (two) times a day, Disp: , Rfl:     busPIRone (BUSPAR) 15 mg tablet, Take 15 mg by mouth 2 (two) times a day, Disp: , Rfl:     Calcium Citrate-Vitamin D (CALCIUM + D PO), Take 1 tablet by mouth 2 (two) times a day, Disp: , Rfl:     carBAMazepine (TEGretol XR) 200 mg 12 hr tablet, Take 2 tablets (400 mg total) by mouth 2 (two) times a day, Disp: 124 tablet, Rfl: 5    carBAMazepine (TEGretol) 200 mg tablet, Take 200 mg by mouth 2 (two) times a day, Disp: , Rfl:     chlorhexidine (PERIDEX) 0 12 % solution, Apply 15 mL to the mouth or throat 2 (two) times a day, Disp: , Rfl:     chlorproMAZINE (THORAZINE) 50 mg tablet, Take 50 mg by mouth 3 (three) times a day  , Disp: , Rfl:     clonazePAM (KlonoPIN) 0 5 mg tablet, Take 0 5 mg by mouth 2 (two) times a day, Disp: , Rfl:     fexofenadine (ALLEGRA) 180 MG tablet, Take 180 mg by mouth daily, Disp: , Rfl:     fluticasone (FLONASE) 50 mcg/act nasal spray, 1 spray into each nostril daily, Disp: , Rfl:     lithium carbonate 300 mg capsule, Take 300 mg by mouth 2 (two) times a day with meals, Disp: , Rfl:     medroxyPROGESTERone (DEPO-PROVERA) 150 mg/mL injection, Inject 150 mg into the shoulder, thigh, or buttocks every 3 (three) months, Disp: , Rfl:     metoprolol tartrate (LOPRESSOR) 50 mg tablet, Take 50 mg by mouth 2 (two) times a day, Disp: , Rfl:     montelukast (SINGULAIR) 10 mg tablet, Take 10 mg by mouth daily at bedtime, Disp: , Rfl:     Multiple Vitamins-Minerals (CERTAVITE/ANTIOXIDANTS PO), Take 1 tablet by mouth daily, Disp: , Rfl:     neomycin-bacitracin-polymyxin (NEOSPORIN) 5-400-5,000 ointment, Apply topically 3 (three) times a day, Disp: 28 3 g, Rfl: 0    neomycin-bacitracin-polymyxin b (NEOSPORIN) ointment, Apply 1 application topically daily, Disp: , Rfl:     olopatadine HCl (PATADAY) 0 2 % opth drops, Administer 1 drop to both eyes daily, Disp: , Rfl:     Psyllium (METAMUCIL) WAFR, Take 1 Wafer by mouth daily, Disp: , Rfl:     risperiDONE (RisperDAL M-TABS) 2 mg dispersible tablet, Take 3 mg by mouth 3 (three) times a day, Disp: , Rfl:     sodium bicarbonate 650 mg tablet, TAKE 2 TABS (1300MG) BY MOUTH TWICE DAILY @ 8AM-8PM (SUPP) *O'SONY, Disp: 120 tablet, Rfl: 4    sodium bicarbonate 650 mg tablet, Take 1 tablet (650 mg total) by mouth 2 (two) times a day, Disp: 120 tablet, Rfl: 2    topiramate (TOPAMAX) 50 MG tablet, 2 TABS(100MG) BY MOUTH 3xDLY @8A-2P-8P*BUCHANAN (SEIZURES/MIGRAINE H A ), Disp: 186 tablet, Rfl: 0    LORazepam (ATIVAN) 1 mg tablet, Take 1 tablet by mouth daily at bedtime as needed (increased activity limiting sleep) (Patient not taking: Reported on 7/2/2019), Disp: 10 tablet, Rfl: 0    Lab Results:   Results for orders placed or performed in visit on 05/28/19   PTH, Intact and Calcium   Result Value Ref Range    PTH, Intact 27 14 - 64 pg/mL    SL AMB CALCIUM 9 2 8 6 - 10 2 mg/dL   Phosphorus   Result Value Ref Range    Phosphorus, Serum 3 5 2 5 - 4 5 mg/dL   Basic metabolic panel   Result Value Ref Range    Glucose, Random 89 65 - 99 mg/dL    BUN 19 7 - 25 mg/dL    Creatinine 1 36 (H) 0 50 - 1 10 mg/dL    eGFR Non  47 (L) > OR = 60 mL/min/1 73m2    eGFR  54 (L) > OR = 60 mL/min/1 73m2    SL AMB BUN/CREATININE RATIO 14 6 - 22 (calc)    Sodium 141 135 - 146 mmol/L    Potassium 3 7 3 5 - 5 3 mmol/L    Chloride 111 (H) 98 - 110 mmol/L    CO2 25 20 - 32 mmol/L    SL AMB CALCIUM 9 2 8 6 - 10 2 mg/dL   CBC and differential   Result Value Ref Range    White Blood Cell Count 4 9 3 8 - 10 8 Thousand/uL    Red Blood Cell Count 4 00 3 80 - 5 10 Million/uL    Hemoglobin 12 5 11 7 - 15 5 g/dL    HCT 37 5 35 0 - 45 0 %    MCV 93 8 80 0 - 100 0 fL    MCH 31 3 27 0 - 33 0 pg    MCHC 33 3 32 0 - 36 0 g/dL    RDW 12 0 11 0 - 15 0 %    Platelet Count 275 028 - 400 Thousand/uL    SL AMB MPV 9 7 7 5 - 12 5 fL    Neutrophils (Absolute) 2,636 1,500 - 7,800 cells/uL    Lymphocytes (Absolute) 1,661 850 - 3,900 cells/uL    Monocytes (Absolute) 417 200 - 950 cells/uL    Eosinophils (Absolute) 147 15 - 500 cells/uL    Basophils ABS 39 0 - 200 cells/uL    Neutrophils 53 8 %    Lymphocytes 33 9 %    Monocytes 8 5 %    Eosinophils 3 0 % Basophils PCT 0 8 %   Protein, Total w/Creat, Random Urine   Result Value Ref Range    Creatinine, Urine 27 20 - 275 mg/dL    Protein/Creat Ratio 222 (H) 21 - 161 mg/g creat    Total Protein, Urine 6 5 - 24 mg/dL

## 2019-07-24 DIAGNOSIS — R56.9 SEIZURE (HCC): ICD-10-CM

## 2019-07-24 RX ORDER — TOPIRAMATE 50 MG/1
100 TABLET, FILM COATED ORAL 3 TIMES DAILY
Qty: 180 TABLET | Refills: 5 | Status: SHIPPED | OUTPATIENT
Start: 2019-07-24 | End: 2020-01-03

## 2019-07-24 RX ORDER — CARBAMAZEPINE 200 MG/1
TABLET, EXTENDED RELEASE ORAL
Qty: 120 TABLET | Refills: 5 | Status: SHIPPED | OUTPATIENT
Start: 2019-07-24 | End: 2019-12-02 | Stop reason: SDUPTHER

## 2019-07-24 NOTE — TELEPHONE ENCOUNTER
[address] [place] [address] called in to request a refill on Topamax 50mg 2 TID  They clarified that what they send to the facility for the carbamazepine is what they requested the 200 mg tablets 2 BID   I called facility and spoke with Will who confirmed she takes:  Topamax 50 mg 2 tablets TID  Carbamazepine 200 mg 2 tablets BID (of note he originally said 200 mg, and then when asking how many tablets of the 200 mg then he stated 2 tablets BID)

## 2019-07-24 NOTE — TELEPHONE ENCOUNTER
Please confirm current dose of carbamazepine with facility  The dose of carbamazepine was reported as 200 mg twice daily at her last visit

## 2019-09-20 ENCOUNTER — TRANSCRIBE ORDERS (OUTPATIENT)
Dept: ADMINISTRATIVE | Facility: HOSPITAL | Age: 44
End: 2019-09-20

## 2019-09-20 DIAGNOSIS — R29.6 FALLS FREQUENTLY: Primary | ICD-10-CM

## 2019-09-25 ENCOUNTER — HOSPITAL ENCOUNTER (OUTPATIENT)
Dept: RADIOLOGY | Age: 44
Discharge: HOME | End: 2019-09-25
Payer: MEDICARE

## 2019-09-25 DIAGNOSIS — R29.6 FALLS FREQUENTLY: ICD-10-CM

## 2019-09-25 PROCEDURE — 70450 CT HEAD/BRAIN W/O DYE: CPT

## 2019-11-05 DIAGNOSIS — E87.2 METABOLIC ACIDOSIS: ICD-10-CM

## 2019-11-06 RX ORDER — SODIUM BICARBONATE 650 MG/1
TABLET ORAL
Qty: 60 TABLET | Refills: 0 | Status: SHIPPED | OUTPATIENT
Start: 2019-11-06 | End: 2019-12-16

## 2019-12-02 DIAGNOSIS — E87.2 METABOLIC ACIDOSIS: Primary | ICD-10-CM

## 2019-12-02 DIAGNOSIS — R56.9 SEIZURE (HCC): ICD-10-CM

## 2019-12-02 RX ORDER — CARBAMAZEPINE 200 MG/1
TABLET, EXTENDED RELEASE ORAL
Qty: 124 TABLET | Refills: 1 | Status: SHIPPED | OUTPATIENT
Start: 2019-12-02 | End: 2020-01-28 | Stop reason: SDUPTHER

## 2019-12-03 RX ORDER — SODIUM BICARBONATE 650 MG/1
TABLET ORAL
Qty: 62 TABLET | Refills: 0 | Status: SHIPPED | OUTPATIENT
Start: 2019-12-03 | End: 2019-12-16 | Stop reason: SDUPTHER

## 2019-12-06 LAB
25(OH)D3 SERPL-MCNC: 39 NG/ML (ref 30–100)
BUN SERPL-MCNC: 19 MG/DL (ref 7–25)
BUN/CREAT SERPL: 15 (CALC) (ref 6–22)
CALCIUM SERPL-MCNC: 9.3 MG/DL (ref 8.6–10.2)
CHLORIDE SERPL-SCNC: 109 MMOL/L (ref 98–110)
CO2 SERPL-SCNC: 26 MMOL/L (ref 20–32)
CREAT SERPL-MCNC: 1.31 MG/DL (ref 0.5–1.1)
CREAT UR-MCNC: 30 MG/DL (ref 20–275)
GLUCOSE SERPL-MCNC: 81 MG/DL (ref 65–139)
MAGNESIUM SERPL-MCNC: 2 MG/DL (ref 1.5–2.5)
OSMOLALITY UR: 259 MOSM/KG (ref 50–1200)
PHOSPHATE SERPL-MCNC: 3 MG/DL (ref 2.5–4.5)
POTASSIUM SERPL-SCNC: 3.7 MMOL/L (ref 3.5–5.3)
PROT UR-MCNC: <4 MG/DL (ref 5–24)
PROT/CREAT UR: ABNORMAL MG/G CREAT (ref 21–161)
PROT/CREAT UR: ABNORMAL MG/MG CREAT (ref 0.02–0.16)
PTH-INTACT SERPL-MCNC: 25 PG/ML (ref 14–64)
SL AMB EGFR AFRICAN AMERICAN: 56 ML/MIN/1.73M2
SL AMB EGFR NON AFRICAN AMERICAN: 49 ML/MIN/1.73M2
SODIUM SERPL-SCNC: 139 MMOL/L (ref 135–146)
SODIUM UR-SCNC: 60 MMOL/L (ref 28–272)

## 2019-12-16 ENCOUNTER — OFFICE VISIT (OUTPATIENT)
Dept: NEPHROLOGY | Facility: CLINIC | Age: 44
End: 2019-12-16
Payer: MEDICARE

## 2019-12-16 VITALS
HEIGHT: 64 IN | HEART RATE: 80 BPM | BODY MASS INDEX: 29.88 KG/M2 | DIASTOLIC BLOOD PRESSURE: 78 MMHG | SYSTOLIC BLOOD PRESSURE: 134 MMHG | WEIGHT: 175 LBS

## 2019-12-16 DIAGNOSIS — E87.2 METABOLIC ACIDOSIS: ICD-10-CM

## 2019-12-16 DIAGNOSIS — I12.9 BENIGN HYPERTENSION WITH CKD (CHRONIC KIDNEY DISEASE) STAGE III (HCC): ICD-10-CM

## 2019-12-16 DIAGNOSIS — N18.30 BENIGN HYPERTENSION WITH CKD (CHRONIC KIDNEY DISEASE) STAGE III (HCC): ICD-10-CM

## 2019-12-16 DIAGNOSIS — N18.30 CHRONIC KIDNEY DISEASE (CKD), STAGE III (MODERATE) (HCC): Primary | ICD-10-CM

## 2019-12-16 DIAGNOSIS — E87.8 LOW BICARBONATE LEVEL: ICD-10-CM

## 2019-12-16 PROCEDURE — 99214 OFFICE O/P EST MOD 30 MIN: CPT | Performed by: INTERNAL MEDICINE

## 2019-12-16 RX ORDER — SODIUM BICARBONATE 650 MG/1
650 TABLET ORAL DAILY
Qty: 90 TABLET | Refills: 3 | Status: SHIPPED | OUTPATIENT
Start: 2019-12-16 | End: 2021-04-13

## 2019-12-16 NOTE — PROGRESS NOTES
NEPHROLOGY OUTPATIENT PROGRESS NOTE   Kaelyn Krause 53 y o  female MRN: [id number]  DATE: 12/16/2019  Reason for visit:   Chief Complaint   Patient presents with    Follow-up    Chronic Kidney Disease     ASSESSMENT and PLAN:  Chronic kidney disease stage III (baseline serum creatinine 1 3, previous baseline used to be 1 0 )  - Last serum creatinine 1 3 in 12/2019 overall stable     - She could have underlying CKD likely secondary to long-term hypertension, chronic long-term lithium related nephrotoxicity (on lithium >12 years which can cause chronic interstitial nephritis )  - We'll check BMP before next visit  -urinalysis in February 2019 bland without hematuria or proteinuria  - Recommend to wean patient off lithium if possible;  discussed with psychiatrist  Dr Debbie Alex during last patient visit who was going to look in to further if it is possible to wean off lithium to lowest dose possible  (Preventive measures - [phone])  -renal ultrasound in February 2019 shows right kidney 10 2 cm, left kidney 9 9 cm, echogenic appearance of right kidney, no hydronephrosis, echogenic appearance in the left kidney,     Serum sodium level overall stable at 139    -she is currently not on any fluid restriction  -prior 24 hour intake and output from February 2019 showed 3 4 L intake with urine output 2 8 L  Based on this data and assuming that this is accurately collected, she does not have significant polyuria   -last urine osmolality 239 in February 2019   - Repeat BMP before next visit   -most recent urinalysis in 2/2019 shows specific gravity 1 006 suggest diluted urine  -patient is overall stable on multiple psych medications including lithium   I am not sure if patient clinically will be able to weaned off of lithium and in which case, may need to consider adding Amiloride if she has ongoing polyuria in the future or has worsening hypernatremia   -may consider repeat 24 hour intake and urine output data during next visit      Concern for medullary nephrocalcinosis on renal ultrasound  -no obvious recent episodes of kidney stone flare-up   -adequate urine volume based on 24 hour urine output data      Hypertension  -due to patient's developmental disorder, patient does not sit still to be able to check blood pressure accurately   To the best of my ability, her blood pressure was noted to be 134/78  patient is not very cooperative to sit still      -Her BP at group home are generally acceptable as per caregiver   -Currently remains on metoprolol      Mild proteinuria, last UPC ratio nonsignificant  Continue to closely monitor  If worsening, may need to consider ACE-inhibitor        Low bicarbonate , improved  -bicarb level improved at 26  Will reduce sodium bicarbonate to one tablet daily from b i d  dosing     - Questionable metabolic acidosis could be due to Topamax related proximal RTA particularly given hyperchloremia, non-anion gap; urine pH elevated 7 with ongoing sodium bicarbonate supplement     I have not heard back from Dr Alejo Verdugo if he made any changes in Lithium dosing since last visit although as per group home medication list, she remains on stable dose of lithium  Diagnoses and all orders for this visit:    Low bicarbonate level  -     sodium bicarbonate 650 mg tablet; Take 1 tablet (650 mg total) by mouth daily  -     Basic metabolic panel; Future  -     Basic metabolic panel    Metabolic acidosis    Chronic kidney disease (CKD), stage III (moderate) ([facility])  -     Basic metabolic panel; Future  -     CBC; Future  -     Magnesium; Future  -     Phosphorus; Future  -     PTH, intact; Future  -     Protein / creatinine ratio, urine;  Future  -     Basic metabolic panel  -     CBC  -     Magnesium  -     Phosphorus  -     PTH, intact    Benign hypertension with CKD (chronic kidney disease) stage III (HCC)        SUBJECTIVE / HPI:  Patient is 42-year-old female with significant past medical history of developmental disorder, mental retardation, occipital injury in the past, history of epilepsy, hypertension for at least 8 years, comes for regular follow-up of renal failure and hypernatremia  Previous baseline creatinine 1 0 going back to 2013 although her creatinine has been in the range of 1 2-1 3 since January 2016  Most recent serum creatinine 1 3 in December 2019  She lives at [facility] and accompanied by staff from [facility] in the office today  She is unable to provide any history and remains frequently agitated  Most of the history is obtained from reviewing medical records and talking to the staff person      As per the staff person, blood pressure is overall acceptable at [facility]   I do not have blood pressure readings available from group home  Unable to comment whether patient has any urinary complaint or not  Patient has been on metoprolol for hypertension  She is also on Topamax, carbamazepine, long-term chronic lithium, buspirone, chlorpromazine, and benztropine     REVIEW OF SYSTEMS:  Review of system remains extremely limited as patient has underlying developmental disorder, does not provide any detailed history  PHYSICAL EXAM:  Vitals:    12/16/19 1141 12/16/19 1203   BP: 126/68 134/78   BP Location: Left arm    Patient Position: Sitting    Cuff Size: Adult    Pulse: 80    Weight: 79 4 kg (175 lb)    Height: 5' 3 5" (1 613 m)      Body mass index is 30 51 kg/m²  Physical Exam   Constitutional: She appears well-developed and well-nourished  HENT:   Head: Normocephalic and atraumatic  Right Ear: External ear normal    Left Ear: External ear normal    Eyes: Pupils are equal, round, and reactive to light  Conjunctivae and EOM are normal    Neck: Neck supple  No JVD present  Cardiovascular: Normal rate and normal heart sounds  Pulmonary/Chest: Effort normal and breath sounds normal  She has no wheezes  She has no rales  Abdominal: Soft  Bowel sounds are normal  She exhibits no distension  There is no tenderness  Musculoskeletal: She exhibits no edema or tenderness  Neurological: She is alert  Underlying developmental disorder  Skin: Skin is warm and dry  No rash noted  Vitals reviewed  PAST MEDICAL HISTORY:  Past Medical History:   Diagnosis Date    [facility] [address] [facility]     Encephalopathy     Hypertension     Seizures ([facility] [address] )        PAST SURGICAL HISTORY:  History reviewed  No pertinent surgical history      SOCIAL HISTORY:  Social History     Substance and Sexual Activity   Alcohol Use No     Social History     Substance and Sexual Activity   Drug Use No     Social History     Tobacco Use   Smoking Status Never Smoker   Smokeless Tobacco Never Used       FAMILY HISTORY:  Family History   Problem Relation Age of Onset    No Known Problems Mother     No Known Problems Father        MEDICATIONS:    Current Outpatient Medications:     ammonium lactate (LAC-HYDRIN) 12 % cream, Apply 1 application topically 2 (two) times a day, Disp: , Rfl:     B Complex Vitamins (VITAMIN B COMPLEX PO), Take 1 tablet by mouth 2 (two) times a day, Disp: , Rfl:     benztropine (COGENTIN) 1 mg tablet, Take 1 mg by mouth 2 (two) times a day, Disp: , Rfl:     betamethasone dipropionate (DIPROSONE) 0 05 % cream, Apply 1 application topically 2 (two) times a day, Disp: , Rfl:     busPIRone (BUSPAR) 15 mg tablet, Take 15 mg by mouth 2 (two) times a day, Disp: , Rfl:     Calcium Citrate-Vitamin D (CALCIUM + D PO), Take 1 tablet by mouth 2 (two) times a day, Disp: , Rfl:     carBAMazepine (TEGretol XR) 200 mg 12 hr tablet, TAKE 2 TABS BY MOUTH 2x DLY @8AM-2PM*BUCHANAN (SEIZURES/MOOD), Disp: 124 tablet, Rfl: 1    chlorhexidine (PERIDEX) 0 12 % solution, Apply 15 mL to the mouth or throat 2 (two) times a day, Disp: , Rfl:     chlorproMAZINE (THORAZINE) 50 mg tablet, Take 50 mg by mouth 3 (three) times a day  , Disp: , Rfl:     clonazePAM (KlonoPIN) 0 5 mg tablet, Take 0 5 mg by mouth 2 (two) times a day, Disp: , Rfl:     fexofenadine (ALLEGRA) 180 MG tablet, Take 180 mg by mouth daily, Disp: , Rfl:     lithium carbonate 300 mg capsule, Take 300 mg by mouth 2 (two) times a day with meals, Disp: , Rfl:     metoprolol tartrate (LOPRESSOR) 50 mg tablet, Take 50 mg by mouth 2 (two) times a day, Disp: , Rfl:     montelukast (SINGULAIR) 10 mg tablet, Take 10 mg by mouth daily at bedtime, Disp: , Rfl:     Multiple Vitamins-Minerals (CERTAVITE/ANTIOXIDANTS PO), Take 1 tablet by mouth daily, Disp: , Rfl:     neomycin-bacitracin-polymyxin b (NEOSPORIN) ointment, Apply 1 application topically daily, Disp: , Rfl:     olopatadine HCl (PATADAY) 0 2 % opth drops, Administer 1 drop to both eyes daily, Disp: , Rfl:     Psyllium (METAMUCIL) WAFR, Take 1 Wafer by mouth daily, Disp: , Rfl:     risperiDONE (RisperDAL M-TABS) 2 mg dispersible tablet, Take 3 mg by mouth 3 (three) times a day, Disp: , Rfl:     sodium bicarbonate 650 mg tablet, Take 1 tablet (650 mg total) by mouth daily, Disp: 90 tablet, Rfl: 3    topiramate (TOPAMAX) 50 MG tablet, Take 2 tablets (100 mg total) by mouth 3 (three) times a day, Disp: 180 tablet, Rfl: 5    acetaminophen (TYLENOL) 500 mg tablet, Take 500 mg by mouth every 4 (four) hours as needed for mild pain, Disp: , Rfl:     fluticasone (FLONASE) 50 mcg/act nasal spray, 1 spray into each nostril daily, Disp: , Rfl:     LORazepam (ATIVAN) 1 mg tablet, Take 1 tablet by mouth daily at bedtime as needed (increased activity limiting sleep) (Patient not taking: Reported on 7/2/2019), Disp: 10 tablet, Rfl: 0    medroxyPROGESTERone (DEPO-PROVERA) 150 mg/mL injection, Inject 150 mg into the shoulder, thigh, or buttocks every 3 (three) months, Disp: , Rfl:     neomycin-bacitracin-polymyxin (NEOSPORIN) 5-400-5,000 ointment, Apply topically 3 (three) times a day (Patient not taking: Reported on 12/16/2019), Disp: 28 3 g, Rfl: 0    Lab Results:   Results for orders placed or performed in visit on 07/02/19 Basic metabolic panel   Result Value Ref Range    Glucose, Random 81 65 - 139 mg/dL    BUN 19 7 - 25 mg/dL    Creatinine 1 31 (H) 0 50 - 1 10 mg/dL    eGFR Non  49 (L) > OR = 60 mL/min/1 73m2    eGFR  56 (L) > OR = 60 mL/min/1 73m2    SL AMB BUN/CREATININE RATIO 15 6 - 22 (calc)    Sodium 139 135 - 146 mmol/L    Potassium 3 7 3 5 - 5 3 mmol/L    Chloride 109 98 - 110 mmol/L    CO2 26 20 - 32 mmol/L    SL AMB CALCIUM 9 3 8 6 - 10 2 mg/dL   Magnesium   Result Value Ref Range    Magnesium, Serum 2 0 1 5 - 2 5 mg/dL   Phosphorus   Result Value Ref Range    Phosphorus, Serum 3 0 2 5 - 4 5 mg/dL   PTH, intact   Result Value Ref Range    PTH, Intact 25 14 - 64 pg/mL   Vitamin D 25 hydroxy   Result Value Ref Range    Vitamin D, 25-Hydroxy, Serum 39 30 - 100 ng/mL   Osmolality, urine   Result Value Ref Range    Osmolality, Ur 259 50 - 1,200 mOsm/kg   Sodium, urine, random   Result Value Ref Range    Sodium, Random Urine 60 28 - 272 mmol/L   Protein, Total w/Creat, Random Urine   Result Value Ref Range    Creatinine, Urine 30 20 - 275 mg/dL    Protein/Creat Ratio NOTE 21 - 161 mg/g creat    Protein/Creat Ratio NOTE 0 021 - 0 161 mg/mg creat    Total Protein, Urine <4 (L) 5 - 24 mg/dL

## 2020-01-03 DIAGNOSIS — R56.9 SEIZURE (HCC): ICD-10-CM

## 2020-01-03 RX ORDER — TOPIRAMATE 50 MG/1
TABLET, FILM COATED ORAL
Qty: 186 TABLET | Refills: 0 | Status: SHIPPED | OUTPATIENT
Start: 2020-01-03 | End: 2020-01-28 | Stop reason: SDUPTHER

## 2020-01-06 ENCOUNTER — APPOINTMENT (OUTPATIENT)
Dept: RADIOLOGY | Facility: CLINIC | Age: 45
End: 2020-01-06
Payer: MEDICARE

## 2020-01-06 ENCOUNTER — OFFICE VISIT (OUTPATIENT)
Dept: URGENT CARE | Facility: CLINIC | Age: 45
End: 2020-01-06
Payer: MEDICARE

## 2020-01-06 VITALS
HEART RATE: 76 BPM | BODY MASS INDEX: 29.88 KG/M2 | HEIGHT: 64 IN | SYSTOLIC BLOOD PRESSURE: 124 MMHG | WEIGHT: 175 LBS | RESPIRATION RATE: 24 BRPM | DIASTOLIC BLOOD PRESSURE: 88 MMHG

## 2020-01-06 DIAGNOSIS — S89.91XA INJURY OF RIGHT KNEE, INITIAL ENCOUNTER: Primary | ICD-10-CM

## 2020-01-06 DIAGNOSIS — S89.91XA INJURY OF RIGHT KNEE, INITIAL ENCOUNTER: ICD-10-CM

## 2020-01-06 PROCEDURE — 99213 OFFICE O/P EST LOW 20 MIN: CPT | Performed by: NURSE PRACTITIONER

## 2020-01-06 PROCEDURE — G0463 HOSPITAL OUTPT CLINIC VISIT: HCPCS | Performed by: NURSE PRACTITIONER

## 2020-01-06 PROCEDURE — 73564 X-RAY EXAM KNEE 4 OR MORE: CPT

## 2020-01-06 NOTE — PATIENT INSTRUCTIONS
The Xray is pending official read  We will call you if there are significant findings  Tylenol as needed for pain   Apply ice as patient tolerated 20 min every 3-4 hours  Follow up with the PCP for additional concerns  Contusion in Adults   WHAT YOU NEED TO KNOW:   A contusion is a bruise that appears on your skin after an injury  A bruise happens when small blood vessels tear but skin does not  When blood vessels tear, blood leaks into nearby tissue, such as soft tissue or muscle  DISCHARGE INSTRUCTIONS:   Return to the emergency department if:   · You have new trouble moving the injured area  · You have tingling or numbness in or near the injured area  · Your hand or foot below the bruise gets cold or turns pale  Contact your healthcare provider if:   · You find a new lump in the injured area  · Your symptoms do not improve with treatment after 4 to 5 days  · You have questions or concerns about your condition or care  Medicines: You may need any of the following:  · NSAIDs  help decrease swelling and pain or fever  This medicine is available with or without a doctor's order  NSAIDs can cause stomach bleeding or kidney problems in certain people  If you take blood thinner medicine, always ask your healthcare provider if NSAIDs are safe for you  Always read the medicine label and follow directions  · Prescription pain medicine  may be given  Do not wait until the pain is severe before you take your medicine  · Take your medicine as directed  Contact your healthcare provider if you think your medicine is not helping or if you have side effects  Tell him of her if you are allergic to any medicine  Keep a list of the medicines, vitamins, and herbs you take  Include the amounts, and when and why you take them  Bring the list or the pill bottles to follow-up visits  Carry your medicine list with you in case of an emergency  Follow up with your healthcare provider as directed:   You may need to return within a week to check your injury again  Write down your questions so you remember to ask them during your visits  Help a contusion heal:   · Rest the injured area  or use it less than usual  If you bruised your leg or foot, you may need crutches or a cane to help you walk  This will help you keep weight off your injured body part  · Apply ice  to decrease swelling and pain  Ice may also help prevent tissue damage  Use an ice pack, or put crushed ice in a plastic bag  Cover it with a towel and place it on your bruise for 15 to 20 minutes every hour or as directed  · Use compression  to support the area and decrease swelling  Wrap an elastic bandage around the area over the bruised muscle  Make sure the bandage is not too tight  You should be able to fit 1 finger between the bandage and your skin  · Elevate (raise) your injured body part  above the level of your heart to help decrease pain and swelling  Use pillows, blankets, or rolled towels to elevate the area as often as you can  · Do not drink alcohol  as directed  Alcohol may slow healing  · Do not stretch injured muscles  right after your injury  Ask your healthcare provider when and how you may safely stretch after your injury  Gentle stretches can help increase your flexibility  · Do not massage the area or put heating pads  on the bruise right after your injury  Heat and massage may slow healing  Your healthcare provider may tell you to apply heat after several days  At that time, heat will start to help the injury heal   Prevent another contusion:   · Stretch and warm up before you play sports or exercise  · Wear protective gear when you play sports  Examples are shin guards and padding       · If you begin a new physical activity, start slowly to give your body a chance to adjust   © 2017 [employer] Information is for End User's use only and may not be sold, redistributed or otherwise used for commercial purposes  All illustrations and images included in CareNotes® are the copyrighted property of A D A M , Inc  or Osvaldo Bustamante  The above information is an  only  It is not intended as medical advice for individual conditions or treatments  Talk to your doctor, nurse or pharmacist before following any medical regimen to see if it is safe and effective for you

## 2020-01-08 NOTE — PROGRESS NOTES
[address] [facility]        NAME: Malcom Harvey is a 53 y o  female  : 1975    MRN: [id number]  DATE: 2020  TIME: 11:25 AM    Assessment and Plan   Injury of right knee, initial encounter [S89 [id number]]  1  Injury of right knee, initial encounter  XR knee 4+ vw right injury     Medical appointment form completed  Copy placed on chart  Original given to caregiver  Patient Instructions   The Xray is pending official read  We will call you if there are significant findings  Tylenol as needed for pain   Apply ice as patient tolerated 20 min every 3-4 hours  Follow up with the PCP for additional concerns  Follow up with PCP in 3-5 days  Proceed to  ER if symptoms worsen  Chief Complaint     Chief Complaint   Patient presents with    Fall     Today  fell on R knee  now some swelling, no noticeable limping         History of Present Illness       Patient is a 24-year-old female accompanied by caregiver from [facility] for evaluation of right knee injury  Patient is non verbal   The caregiver reports that she fell around 11 o'clock this morning while walking [place] the [employer] alley  Reports that she did not witness the fall but it was reported to her that it was a mechanical fall  At change of shift the caregiver noticed that the knee was swollen  Denies limping  No OTC medications provided         Review of Systems   Review of Systems   Unable to perform ROS: Patient nonverbal         Current Medications       Current Outpatient Medications:     acetaminophen (TYLENOL) 500 mg tablet, Take 500 mg by mouth every 4 (four) hours as needed for mild pain, Disp: , Rfl:     ammonium lactate (LAC-HYDRIN) 12 % cream, Apply 1 application topically 2 (two) times a day, Disp: , Rfl:     B Complex Vitamins (VITAMIN B COMPLEX PO), Take 1 tablet by mouth 2 (two) times a day, Disp: , Rfl:     benztropine (COGENTIN) 1 mg tablet, Take 1 mg by mouth 2 (two) times a day, Disp: , Rfl:    betamethasone dipropionate (DIPROSONE) 0 05 % cream, Apply 1 application topically 2 (two) times a day, Disp: , Rfl:     busPIRone (BUSPAR) 15 mg tablet, Take 15 mg by mouth 2 (two) times a day, Disp: , Rfl:     Calcium Citrate-Vitamin D (CALCIUM + D PO), Take 1 tablet by mouth 2 (two) times a day, Disp: , Rfl:     carBAMazepine (TEGretol XR) 200 mg 12 hr tablet, TAKE 2 TABS BY MOUTH 2x DLY @8AM-2PM*BUCHANAN (SEIZURES/MOOD), Disp: 124 tablet, Rfl: 1    chlorhexidine (PERIDEX) 0 12 % solution, Apply 15 mL to the mouth or throat 2 (two) times a day, Disp: , Rfl:     chlorproMAZINE (THORAZINE) 50 mg tablet, Take 50 mg by mouth 3 (three) times a day  , Disp: , Rfl:     clonazePAM (KlonoPIN) 0 5 mg tablet, Take 0 5 mg by mouth 2 (two) times a day, Disp: , Rfl:     fexofenadine (ALLEGRA) 180 MG tablet, Take 180 mg by mouth daily, Disp: , Rfl:     fluticasone (FLONASE) 50 mcg/act nasal spray, 1 spray into each nostril daily, Disp: , Rfl:     lithium carbonate 300 mg capsule, Take 300 mg by mouth 2 (two) times a day with meals, Disp: , Rfl:     medroxyPROGESTERone (DEPO-PROVERA) 150 mg/mL injection, Inject 150 mg into the shoulder, thigh, or buttocks every 3 (three) months, Disp: , Rfl:     metoprolol tartrate (LOPRESSOR) 50 mg tablet, Take 50 mg by mouth 2 (two) times a day, Disp: , Rfl:     montelukast (SINGULAIR) 10 mg tablet, Take 10 mg by mouth daily at bedtime, Disp: , Rfl:     Multiple Vitamins-Minerals (CERTAVITE/ANTIOXIDANTS PO), Take 1 tablet by mouth daily, Disp: , Rfl:     neomycin-bacitracin-polymyxin (NEOSPORIN) 5-400-5,000 ointment, Apply topically 3 (three) times a day, Disp: 28 3 g, Rfl: 0    neomycin-bacitracin-polymyxin b (NEOSPORIN) ointment, Apply 1 application topically daily, Disp: , Rfl:     olopatadine HCl (PATADAY) 0 2 % opth drops, Administer 1 drop to both eyes daily, Disp: , Rfl:     Psyllium (METAMUCIL) WAFR, Take 1 Wafer by mouth daily, Disp: , Rfl:     risperiDONE (RisperDAL M-TABS) 2 mg dispersible tablet, Take 3 mg by mouth 3 (three) times a day, Disp: , Rfl:     sodium bicarbonate 650 mg tablet, Take 1 tablet (650 mg total) by mouth daily, Disp: 90 tablet, Rfl: 3    topiramate (TOPAMAX) 50 MG tablet, 2 TABS(100MG) BY MOUTH 3xDLY @8A-2P-8P*BUCHANAN (SEIZURES/MIGRAINE H A ), Disp: 186 tablet, Rfl: 0    LORazepam (ATIVAN) 1 mg tablet, Take 1 tablet by mouth daily at bedtime as needed (increased activity limiting sleep) (Patient not taking: Reported on 7/2/2019), Disp: 10 tablet, Rfl: 0    Current Allergies     Allergies as of 01/06/2020 - Reviewed 01/06/2020   Allergen Reaction Noted    Inderal [propranolol] Shortness Of Breath 01/20/2016    Catapres [clonidine hcl]  01/20/2016    Other  12/13/2016            The following portions of the patient's history were reviewed and updated as appropriate: allergies, current medications, past family history, past medical history, past social history, past surgical history and problem list      Past Medical History:   Diagnosis Date    [facility] [address] [facility]     Encephalopathy     Hypertension     Seizures ([facility] [address] [facility]        No past surgical history on file  Family History   Problem Relation Age of Onset    No Known Problems Mother     No Known Problems Father          Medications have been verified  Objective   /88 (Patient Position: Sitting)   Pulse 76   Resp (!) 24   Ht 5' 3 5" (1 613 m)   Wt 79 4 kg (175 lb)   BMI 30 51 kg/m²     Right knee xray: No acute osseous abnormality  Physical Exam     Physical Exam   Constitutional: Vital signs are normal  She appears well-developed and well-nourished  She is active  No distress  Cardiovascular: Normal rate, regular rhythm, S1 normal, S2 normal and normal heart sounds  Pulmonary/Chest: Effort normal and breath sounds normal  No respiratory distress  She has no decreased breath sounds  She has no wheezes  She has no rhonchi  She has no rales     Musculoskeletal: Right knee: She exhibits swelling (generalized right knee swelling  No ecchymosis  No gait distrubance  )  She exhibits normal range of motion  Neurological: She is alert

## 2020-01-23 ENCOUNTER — TELEPHONE (OUTPATIENT)
Dept: NEUROLOGY | Facility: CLINIC | Age: 45
End: 2020-01-23

## 2020-01-27 ENCOUNTER — APPOINTMENT (OUTPATIENT)
Dept: LAB | Facility: CLINIC | Age: 45
End: 2020-01-27
Payer: MEDICARE

## 2020-01-27 ENCOUNTER — TRANSCRIBE ORDERS (OUTPATIENT)
Dept: LAB | Facility: CLINIC | Age: 45
End: 2020-01-27
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
## 2020-01-27 DIAGNOSIS — F20.0 PARANOID SCHIZOPHRENIA, SUBCHRONIC CONDITION (HCC): ICD-10-CM
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
## 2020-01-27 DIAGNOSIS — F31.9 BIPOLAR AFFECTIVE DISORDER, REMISSION STATUS UNSPECIFIED (HCC): ICD-10-CM

## 2020-01-27 DIAGNOSIS — F25.9 SCHIZOAFFECTIVE DISORDER, CHRONIC CONDITION WITH ACUTE EXACERBATION (HCC): ICD-10-CM

## 2020-01-27 LAB
ALBUMIN SERPL BCP-MCNC: 3.6 G/DL (ref 3.5–5)
ALP SERPL-CCNC: 85 U/L (ref 46–116)
ALT SERPL W P-5'-P-CCNC: 33 U/L (ref 12–78)
ANION GAP SERPL CALCULATED.3IONS-SCNC: 12 MMOL/L (ref 4–13)
AST SERPL W P-5'-P-CCNC: 27 U/L (ref 5–45)
ATRIAL RATE: 85 BPM
BASOPHILS # BLD AUTO: 0.05 THOUSANDS/ΜL (ref 0–0.1)
BASOPHILS NFR BLD AUTO: 1 % (ref 0–1)
BILIRUB SERPL-MCNC: 0.33 MG/DL (ref 0.2–1)
BUN SERPL-MCNC: 16 MG/DL (ref 5–25)
CALCIUM SERPL-MCNC: 9.1 MG/DL (ref 8.3–10.1)
CHLORIDE SERPL-SCNC: 109 MMOL/L (ref 100–108)
CO2 SERPL-SCNC: 21 MMOL/L (ref 21–32)
CREAT SERPL-MCNC: 1.37 MG/DL (ref 0.6–1.3)
EOSINOPHIL # BLD AUTO: 0.19 THOUSAND/ΜL (ref 0–0.61)
EOSINOPHIL NFR BLD AUTO: 4 % (ref 0–6)
ERYTHROCYTE [DISTWIDTH] IN BLOOD BY AUTOMATED COUNT: 13 % (ref 11.6–15.1)
GFR SERPL CREATININE-BSD FRML MDRD: 53 ML/MIN/1.73SQ M
GLUCOSE P FAST SERPL-MCNC: 88 MG/DL (ref 65–99)
HCT VFR BLD AUTO: 44.5 % (ref 34.8–46.1)
HGB BLD-MCNC: 13.2 G/DL (ref 11.5–15.4)
IMM GRANULOCYTES # BLD AUTO: 0.01 THOUSAND/UL (ref 0–0.2)
IMM GRANULOCYTES NFR BLD AUTO: 0 % (ref 0–2)
LYMPHOCYTES # BLD AUTO: 1.34 THOUSANDS/ΜL (ref 0.6–4.47)
LYMPHOCYTES NFR BLD AUTO: 30 % (ref 14–44)
MCH RBC QN AUTO: 30.3 PG (ref 26.8–34.3)
MCHC RBC AUTO-ENTMCNC: 29.7 G/DL (ref 31.4–37.4)
MCV RBC AUTO: 102 FL (ref 82–98)
MONOCYTES # BLD AUTO: 0.47 THOUSAND/ΜL (ref 0.17–1.22)
MONOCYTES NFR BLD AUTO: 10 % (ref 4–12)
NEUTROPHILS # BLD AUTO: 2.45 THOUSANDS/ΜL (ref 1.85–7.62)
NEUTS SEG NFR BLD AUTO: 55 % (ref 43–75)
NRBC BLD AUTO-RTO: 0 /100 WBCS
P AXIS: 62 DEGREES
PLATELET # BLD AUTO: 271 THOUSANDS/UL (ref 149–390)
PMV BLD AUTO: 10.1 FL (ref 8.9–12.7)
POTASSIUM SERPL-SCNC: 3.5 MMOL/L (ref 3.5–5.3)
PR INTERVAL: 198 MS
PROT SERPL-MCNC: 7.8 G/DL (ref 6.4–8.2)
QRS AXIS: 34 DEGREES
QRSD INTERVAL: 84 MS
QT INTERVAL: 330 MS
QTC INTERVAL: 392 MS
RBC # BLD AUTO: 4.35 MILLION/UL (ref 3.81–5.12)
SODIUM SERPL-SCNC: 142 MMOL/L (ref 136–145)
T WAVE AXIS: 2 DEGREES
VENTRICULAR RATE: 85 BPM
WBC # BLD AUTO: 4.51 THOUSAND/UL (ref 4.31–10.16)

## 2020-01-27 PROCEDURE — 93010 ELECTROCARDIOGRAM REPORT: CPT | Performed by: INTERNAL MEDICINE

## 2020-01-27 PROCEDURE — 93005 ELECTROCARDIOGRAM TRACING: CPT

## 2020-01-27 PROCEDURE — 36415 COLL VENOUS BLD VENIPUNCTURE: CPT

## 2020-01-27 PROCEDURE — 85025 COMPLETE CBC W/AUTO DIFF WBC: CPT

## 2020-01-27 PROCEDURE — 80053 COMPREHEN METABOLIC PANEL: CPT

## 2020-01-28 ENCOUNTER — OFFICE VISIT (OUTPATIENT)
Dept: NEUROLOGY | Facility: CLINIC | Age: 45
End: 2020-01-28
Payer: MEDICARE

## 2020-01-28 VITALS
OXYGEN SATURATION: 97 % | HEART RATE: 77 BPM | BODY MASS INDEX: 29.19 KG/M2 | SYSTOLIC BLOOD PRESSURE: 134 MMHG | RESPIRATION RATE: 14 BRPM | WEIGHT: 171 LBS | HEIGHT: 64 IN | DIASTOLIC BLOOD PRESSURE: 78 MMHG

## 2020-01-28 DIAGNOSIS — G40.209 PARTIAL SYMPTOMATIC EPILEPSY WITH COMPLEX PARTIAL SEIZURES, NOT INTRACTABLE, WITHOUT STATUS EPILEPTICUS (HCC): Primary | ICD-10-CM

## 2020-01-28 DIAGNOSIS — E87.8 LOW BICARBONATE LEVEL: ICD-10-CM

## 2020-01-28 DIAGNOSIS — N18.30 CHRONIC KIDNEY DISEASE (CKD), STAGE III (MODERATE) (HCC): ICD-10-CM

## 2020-01-28 PROCEDURE — 99214 OFFICE O/P EST MOD 30 MIN: CPT | Performed by: PSYCHIATRY & NEUROLOGY

## 2020-01-28 RX ORDER — TOPIRAMATE 50 MG/1
100 TABLET, FILM COATED ORAL 3 TIMES DAILY
Qty: 186 TABLET | Refills: 0 | Status: SHIPPED | OUTPATIENT
Start: 2020-01-28 | End: 2020-03-04 | Stop reason: SDUPTHER

## 2020-01-28 RX ORDER — CARBAMAZEPINE 200 MG/1
400 TABLET, EXTENDED RELEASE ORAL 2 TIMES DAILY
Qty: 124 TABLET | Refills: 5 | Status: SHIPPED | OUTPATIENT
Start: 2020-01-28 | End: 2020-07-29 | Stop reason: SDUPTHER

## 2020-01-28 NOTE — PROGRESS NOTES
[place] [address] [facility]  Follow Up Visit    Impression/Plan    Ms Ag Hennessy is a 53 y o  female with epilepsy, likely focal due to childhood injury, but most recent EEG (slowing) and MRI unrevealing  Seizures currently well controlled, with seizure freedom for greater than 6 years  CKD and low bicarb are followed by nephrology  Topiramate may be contributing to metabolic acidosis (proximal RTA due to topiramate)  A trial of monotherapy with carbamazepine may be reasonable approach, but will take a gradual approach  If carbamazepine level is reasonable will then decrease topiramate to 100 mg bid and follow up in 6 months to consider further decrease  Adding carbamazepine level onto labs drawn yesterday  Patient Instructions   1  Carbamazepine level added onto blood work done yesterday  Consider decrease in topiramate dose if carbamazepine level is adequate  2  Let us know if there are events concerning for seizure  3  Return in 6 months  Diagnoses and all orders for this visit:    Partial symptomatic epilepsy with complex partial seizures, not intractable, without status epilepticus (HCC)  -     Carbamazepine level, total; Future  -     carBAMazepine (TEGretol XR) 200 mg 12 hr tablet; Take 2 tablets (400 mg total) by mouth 2 (two) times a day  -     topiramate (TOPAMAX) 50 MG tablet; Take 2 tablets (100 mg total) by mouth 3 (three) times a day    Chronic kidney disease (CKD), stage III (moderate) (HCC)    Low bicarbonate level        Will Carranza is returning to the [place] [address] [facility] for follow up  Her history includes PDD, MR, occipital brain injury and seizures  There was a severe head injury at 18 months  She is nonverbal and a caregiver assists in providing history  She was previously followed by Dr Kenya Sanchez   Past notes describe her seizures as violent shaking with foaming at the mouth that can be preceded by a loud scream  Past events tend to occur at night  There has been urinary incontinence  Interval Events:   Seizures since last visit: None (Her last seizure occurred near 2015 when she was outside at a picnic and may have been provoked by stress or activity  )    No events concerning for seizure  No staring spells  No evidence of nocturnal seizure  No concerning myoclonus  Current AEDs:  Tegretol  mg twice daily  Clonazepam 0 5 mg tid  Topiramate 100 mg tid     Medication side effects:  None known  Medication adherence: Yes    Other medications include:  Depo-Provera    Event/Seizure semiology:  Violent shaking with foaming at the mouth that can be preceded by a loud scream    Special Features  Status epilepticus:  Not known  Self Injury Seizures:  None known  Precipitating Factors: Stress    Epilepsy Risk Factors:  Intellectual disability  Head injury (moderate/severe)    Prior AEDs:  unknown    Prior Evaluation:  MRI in 2008 was read as normal  Dr Sudeep Nick last note indicates that her last EEG was unrevealing for source and no focal discharges noted  There was generalized encephalopathy  History Reviewed: The following were reviewed and updated as appropriate: past medical history, past social history and problem list    Psychiatric History:  Followed by psychiatry, multiple psychiatric medications  Behavioral outbursts  Social History:   Driving: No  Lives Alone: No  Occupation: on permanent disability    ROS:  Constitutional: Negative  Negative for appetite change and fever  HENT: Negative  Negative for hearing loss, tinnitus, trouble swallowing and voice change  Eyes: Negative  Negative for photophobia and pain  Respiratory: Negative  Negative for shortness of breath  Cardiovascular: Negative  Negative for palpitations  Gastrointestinal: Negative  Negative for nausea and vomiting  Endocrine: Negative  Negative for cold intolerance and heat intolerance  Genitourinary: Negative    Negative for dysuria, frequency and urgency  Musculoskeletal: Negative  Negative for myalgias and neck pain  Skin: Negative  Negative for rash  Allergic/Immunologic: Negative  Neurological: Negative  Negative for dizziness, tremors, seizures, syncope, facial asymmetry, speech difficulty, weakness, light-headedness, numbness and headaches  Hematological: Negative  Does not bruise/bleed easily  Psychiatric/Behavioral: Negative  Negative for confusion, hallucinations and sleep disturbance  All other systems reviewed and are negative     ROS reviewed and updated as appropriate  Objective    /78 (BP Location: Left arm, Patient Position: Sitting, Cuff Size: Standard)   Pulse 77   Resp 14   Ht 5' 3 5" (1 613 m)   Wt 77 6 kg (171 lb)   SpO2 97%   BMI 29 82 kg/m²      General Exam  No acute distress  Neurologic Exam  Mental Status:  Non verbal, does not follow most commands  :Will give five with either hand but does not give thumbs up to command or mimic (consistent with prior exams)  Language: Nonverbal  Cranial Nerves: Face symmetric  Motor: At least 4/5 throughout, not able to formally test    Coordination: no clear ataxia when reaching  Gait: Gait is wide, steady

## 2020-01-28 NOTE — PROGRESS NOTES
Review of Systems   Constitutional: Negative  Negative for appetite change and fever  HENT: Negative  Negative for hearing loss, tinnitus, trouble swallowing and voice change  Eyes: Negative  Negative for photophobia and pain  Respiratory: Negative  Negative for shortness of breath  Cardiovascular: Negative  Negative for palpitations  Gastrointestinal: Negative  Negative for nausea and vomiting  Endocrine: Negative  Negative for cold intolerance and heat intolerance  Genitourinary: Negative  Negative for dysuria, frequency and urgency  Musculoskeletal: Negative  Negative for myalgias and neck pain  Skin: Negative  Negative for rash  Allergic/Immunologic: Negative  Neurological: Negative  Negative for dizziness, tremors, seizures, syncope, facial asymmetry, speech difficulty, weakness, light-headedness, numbness and headaches  Hematological: Negative  Does not bruise/bleed easily  Psychiatric/Behavioral: Negative  Negative for confusion, hallucinations and sleep disturbance  All other systems reviewed and are negative

## 2020-01-28 NOTE — PATIENT INSTRUCTIONS
1  Carbamazepine level added onto blood work done yesterday  Consider decrease in topiramate dose if carbamazepine level is adequate  2  Let us know if there are events concerning for seizure  3  Return in 6 months

## 2020-02-21 ENCOUNTER — HOSPITAL ENCOUNTER (EMERGENCY)
Facility: HOSPITAL | Age: 45
Discharge: HOME | End: 2020-02-21
Attending: EMERGENCY MEDICINE | Admitting: EMERGENCY MEDICINE
Payer: MEDICARE

## 2020-02-21 ENCOUNTER — APPOINTMENT (EMERGENCY)
Dept: CT IMAGING | Facility: HOSPITAL | Age: 45
End: 2020-02-21
Payer: MEDICARE

## 2020-02-21 VITALS — OXYGEN SATURATION: 98 % | TEMPERATURE: 97.4 F | HEART RATE: 83 BPM | RESPIRATION RATE: 16 BRPM

## 2020-02-21 DIAGNOSIS — S09.90XA INJURY OF HEAD, INITIAL ENCOUNTER: Primary | ICD-10-CM
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
## 2020-02-21 PROCEDURE — 70450 CT HEAD/BRAIN W/O DYE: CPT

## 2020-02-21 PROCEDURE — 99284 EMERGENCY DEPT VISIT MOD MDM: CPT

## 2020-02-21 PROCEDURE — 99284 EMERGENCY DEPT VISIT MOD MDM: CPT | Performed by: EMERGENCY MEDICINE

## 2020-02-21 PROCEDURE — 70486 CT MAXILLOFACIAL W/O DYE: CPT

## 2020-02-21 NOTE — ED ATTENDING ATTESTATION
2/21/2020  I, Lemon Cogan, MD, saw and evaluated the patient  I have discussed the patient with the resident/non-physician practitioner and agree with the resident's/non-physician practitioner's findings, Plan of Care, and MDM as documented in the resident's/non-physician practitioner's note, except where noted  All available labs and Radiology studies were reviewed  I was present for key portions of any procedure(s) performed by the resident/non-physician practitioner and I was immediately available to provide assistance  At this point I agree with the current assessment done in the Emergency Department  I have conducted an independent evaluation of this patient a history and physical is as follows:  Mechanical fall  Abrasion to right cheek  At neurologic baseline  History of seizure disorder, nonverbal   Ambulatory, moving all extremities  CT head and face with no acute findings  No bony tenderness over the nasal bone  Suspect old fracture  Stable for discharge home      ED Course         Critical Care Time  Procedures

## 2020-02-21 NOTE — ED PROVIDER NOTES
History  Chief Complaint   Patient presents with    Head Injury     Pt presents to the ED after tripping over the sidewalk and falling on her face  No LOC, no thinners  Lei Sinclair is a 49-year-old female, presenting to [address] [place] [address] ED, possessing pertinent history of epilepsy managed by outpatient neurology (Dr Tyler Garber) and chronic kidney disease stage 3, subsequent to a mechanical fall from standing height; patient fell on her face  Per the patient's aide (patient is nonverbal at her baseline), there was no seizure-like activity or loss of consciousness  After medication reconciliation, patient is not on a blood thinner  History provided by:  Patient and caregiver   used: No        Prior to Admission Medications   Prescriptions Last Dose Informant Patient Reported? Taking?    B Complex Vitamins (VITAMIN B COMPLEX PO)  Care Giver Yes No   Sig: Take 1 tablet by mouth 2 (two) times a day   Calcium Citrate-Vitamin D (CALCIUM + D PO)  Care Giver Yes No   Sig: Take 1 tablet by mouth 2 (two) times a day   LORazepam (ATIVAN) 1 mg tablet  Care Giver No No   Sig: Take 1 tablet by mouth daily at bedtime as needed (increased activity limiting sleep)   Patient not taking: Reported on 7/2/2019   Multiple Vitamins-Minerals (CERTAVITE/ANTIOXIDANTS PO)  Care Giver Yes No   Sig: Take 1 tablet by mouth daily   Psyllium (METAMUCIL) WAFR  Care Giver Yes No   Sig: Take 1 Wafer by mouth daily   acetaminophen (TYLENOL) 500 mg tablet  Care Giver Yes No   Sig: Take 500 mg by mouth every 4 (four) hours as needed for mild pain   ammonium lactate (LAC-HYDRIN) 12 % cream  Care Giver Yes No   Sig: Apply 1 application topically 2 (two) times a day   benztropine (COGENTIN) 1 mg tablet  Care Giver Yes No   Sig: Take 1 mg by mouth 2 (two) times a day   betamethasone dipropionate (DIPROSONE) 0 05 % cream  Care Giver Yes No   Sig: Apply 1 application topically 2 (two) times a day   busPIRone (BUSPAR) 15 mg tablet  Care Giver Yes No   Sig: Take 15 mg by mouth 2 (two) times a day   carBAMazepine (TEGretol XR) 200 mg 12 hr tablet   No No   Sig: Take 2 tablets (400 mg total) by mouth 2 (two) times a day   chlorhexidine (PERIDEX) 0 12 % solution  Care Giver Yes No   Sig: Apply 15 mL to the mouth or throat 2 (two) times a day   chlorproMAZINE (THORAZINE) 50 mg tablet  Care Giver Yes No   Sig: Take 50 mg by mouth 3 (three) times a day     clonazePAM (KlonoPIN) 0 5 mg tablet  Care Giver Yes No   Sig: Take 0 5 mg by mouth 2 (two) times a day   fexofenadine (ALLEGRA) 180 MG tablet  Care Giver Yes No   Sig: Take 180 mg by mouth daily   fluticasone (FLONASE) 50 mcg/act nasal spray  Care Giver Yes No   Si spray into each nostril daily   lithium carbonate 300 mg capsule  Care Giver Yes No   Sig: Take 300 mg by mouth 2 (two) times a day with meals   medroxyPROGESTERone (DEPO-PROVERA) 150 mg/mL injection  Care Giver Yes No   Sig: Inject 150 mg into the shoulder, thigh, or buttocks every 3 (three) months   metoprolol tartrate (LOPRESSOR) 50 mg tablet  Care Giver Yes No   Sig: Take 50 mg by mouth 2 (two) times a day   montelukast (SINGULAIR) 10 mg tablet  Care Giver Yes No   Sig: Take 10 mg by mouth daily at bedtime   neomycin-bacitracin-polymyxin (NEOSPORIN) 5-400-5,000 ointment  Care Giver No No   Sig: Apply topically 3 (three) times a day   neomycin-bacitracin-polymyxin b (NEOSPORIN) ointment  Care Giver Yes No   Sig: Apply 1 application topically daily   olopatadine HCl (PATADAY) 0 2 % opth drops  Care Giver Yes No   Sig: Administer 1 drop to both eyes daily   risperiDONE (RisperDAL M-TABS) 2 mg dispersible tablet  Care Giver Yes No   Sig: Take 3 mg by mouth 3 (three) times a day   sodium bicarbonate 650 mg tablet  Care Giver No No   Sig: Take 1 tablet (650 mg total) by mouth daily   topiramate (TOPAMAX) 50 MG tablet   No No   Sig: Take 2 tablets (100 mg total) by mouth 3 (three) times a day Facility-Administered Medications: None       Past Medical History:   Diagnosis Date    [facility] [address] [facility]     Encephalopathy     Hypertension     Seizures ([facility] [address] )        No past surgical history on file  Family History   Problem Relation Age of Onset    No Known Problems Mother     No Known Problems Father      I have reviewed and agree with the history as documented  Social History     Tobacco Use    Smoking status: Never Smoker    Smokeless tobacco: Never Used   Substance Use Topics    Alcohol use: No    Drug use: No        Review of Systems   Unable to perform ROS: Patient nonverbal       Physical Exam  ED Triage Vitals   Temperature Pulse Respirations BP SpO2   02/21/20 1140 02/21/20 1138 02/21/20 1138 -- 02/21/20 1138   (!) 97 4 °F (36 3 °C) 83 16  98 %      Temp Source Heart Rate Source Patient Position - Orthostatic VS BP Location FiO2 (%)   02/21/20 1140 02/21/20 1138 -- 02/21/20 1138 --   Oral Monitor  Right arm       Pain Score       --                    Orthostatic Vital Signs  Vitals:    02/21/20 1138   Pulse: 83       Physical Exam   Constitutional: Vital signs are normal  She appears well-developed and well-nourished  She is cooperative  She does not appear ill  No distress  HENT:   Head: Normocephalic  Head is with abrasion  Head is without raccoon's eyes, without Farrell's sign, without contusion and without laceration  Eyes: Pupils are equal, round, and reactive to light  Conjunctivae, EOM and lids are normal    Neck: Normal range of motion and phonation normal  Neck supple  Cardiovascular: Normal rate, regular rhythm, normal heart sounds and intact distal pulses  Exam reveals no friction rub  No murmur heard  Pulmonary/Chest: Effort normal and breath sounds normal  No respiratory distress  She has no decreased breath sounds  She exhibits no tenderness  Abdominal: Soft  Normal appearance and bowel sounds are normal  She exhibits no distension  There is no tenderness  There is no rebound and no guarding  Musculoskeletal: Normal range of motion  Spontaneous movement of all 4 limbs   Neurological: She is alert  She has normal strength  Able to adhere to simple commands   Skin: Skin is warm and dry  Capillary refill takes less than 2 seconds  Abrasion noted  She is not diaphoretic  Vitals reviewed  ED Medications  Medications - No data to display    Diagnostic Studies  Results Reviewed     None                 CT head without contrast   Final Result by Brenda Villasenor MD (02/21 1315)      No acute intracranial abnormality  Workstation performed: [id number]         CT facial bones without contrast   Final Result by Brenda Villasenor MD (02/21 1318)      Age-indeterminate fracture of the right nasal bone image 5/82  Clinical correlation for focal tenderness necessary  Workstation performed: [id number]               Procedures  Procedures      ED Course                               MDM  Number of Diagnoses or Management Options  Fall, initial encounter: new and requires workup  Injury of head, initial encounter: new and requires workup     Amount and/or Complexity of Data Reviewed  Tests in the radiology section of CPT®: ordered and reviewed  Obtain history from someone other than the patient: yes    Risk of Complications, Morbidity, and/or Mortality  Presenting problems: moderate  Diagnostic procedures: low  Management options: moderate    Patient Progress  Patient progress: stable        Disposition  Final diagnoses:   Injury of head, initial encounter   Fall, initial encounter     Time reflects when diagnosis was documented in both MDM as applicable and the Disposition within this note     Time User Action Codes Description Comment    2/21/2020  1:20 PM Prudence Brown Add [S09 90XA] Injury of head, initial encounter     2/21/2020  1:20 PM Prudence Brown Add [W19  XXXA] Fall, initial encounter       ED Disposition     ED Disposition Condition Date/Time Comment    Discharge Stable   1:19 PM Cora Fragoso discharge to home/self care  Follow-up Information     Follow up With Specialties Details Why Contact Info Additional Information    Jarod Resendez MD Family Medicine Schedule an appointment as soon as possible for a visit in 2 days As needed [address] [address] [phone]       [facility] Emergency Department Emergency Medicine Go to  If symptoms worsen [address] [facility]  AN ED, [address], [place], [place], [address]          Patient's Medications   Discharge Prescriptions    No medications on file     No discharge procedures on file  ED Provider  Attending physically available and evaluated Cora Fragoso I managed the patient along with the ED Attending      Electronically Signed by         Alvin Willoughby 1560,   20 Kenia Anderson MD  20 [id number]

## 2020-03-02 DIAGNOSIS — G40.209 PARTIAL SYMPTOMATIC EPILEPSY WITH COMPLEX PARTIAL SEIZURES, NOT INTRACTABLE, WITHOUT STATUS EPILEPTICUS (HCC): ICD-10-CM

## 2020-03-03 LAB — CARBAMAZEPINE SERPL-MCNC: 7.3 MG/L (ref 4–12)

## 2020-03-04 ENCOUNTER — TELEPHONE (OUTPATIENT)
Dept: NEUROLOGY | Facility: CLINIC | Age: 45
End: 2020-03-04

## 2020-03-04 DIAGNOSIS — G40.209 PARTIAL SYMPTOMATIC EPILEPSY WITH COMPLEX PARTIAL SEIZURES, NOT INTRACTABLE, WITHOUT STATUS EPILEPTICUS (HCC): ICD-10-CM

## 2020-03-04 RX ORDER — TOPIRAMATE 50 MG/1
TABLET, FILM COATED ORAL
Qty: 186 TABLET | Refills: 0 | OUTPATIENT
Start: 2020-03-04

## 2020-03-04 RX ORDER — TOPIRAMATE 50 MG/1
100 TABLET, FILM COATED ORAL 2 TIMES DAILY
Qty: 120 TABLET | Refills: 5 | Status: SHIPPED | OUTPATIENT
Start: 2020-03-04 | End: 2020-07-29 | Stop reason: SDUPTHER

## 2020-03-04 NOTE — TELEPHONE ENCOUNTER
Called group home, spoke w/ Brandy Azul (caregiver) and advised of the below  She verbalized understanding  Brandy Azul is aware that new topiramate script was sent to [facility]  Requesting copy of the script be faxed to [phone]  Fax sent        ----- Message from Luke Yan MD sent at 3/4/2020  2:54 PM EST -----  Carbamazepine level is adequate  Decrease topiramate from 100 mg tid to 100 mg bid as per plan at last visit

## 2020-03-04 NOTE — RESULT ENCOUNTER NOTE
Carbamazepine level is adequate  Decrease topiramate from 100 mg tid to 100 mg bid as per plan at last visit

## 2020-05-05 ENCOUNTER — CLINICAL SUPPORT (OUTPATIENT)
Dept: OBGYN CLINIC | Facility: CLINIC | Age: 45
End: 2020-05-05
Payer: MEDICARE

## 2020-05-05 DIAGNOSIS — Z30.42 SURVEILLANCE FOR DEPO-PROVERA CONTRACEPTION: Primary | ICD-10-CM

## 2020-05-05 PROCEDURE — 96372 THER/PROPH/DIAG INJ SC/IM: CPT | Performed by: OBSTETRICS & GYNECOLOGY

## 2020-05-05 RX ORDER — MEDROXYPROGESTERONE ACETATE 150 MG/ML
150 INJECTION, SUSPENSION INTRAMUSCULAR ONCE
Status: COMPLETED | OUTPATIENT
Start: 2020-05-05 | End: 2020-05-05

## 2020-05-05 RX ADMIN — MEDROXYPROGESTERONE ACETATE 150 MG: 150 INJECTION, SUSPENSION INTRAMUSCULAR at 14:34

## 2020-05-21 ENCOUNTER — OFFICE VISIT (OUTPATIENT)
Dept: URGENT CARE | Facility: CLINIC | Age: 45
End: 2020-05-21
Payer: MEDICARE

## 2020-05-21 ENCOUNTER — TELEPHONE (OUTPATIENT)
Dept: NEPHROLOGY | Facility: CLINIC | Age: 45
End: 2020-05-21

## 2020-05-21 VITALS
OXYGEN SATURATION: 100 % | HEART RATE: 90 BPM | TEMPERATURE: 98 F | DIASTOLIC BLOOD PRESSURE: 86 MMHG | HEIGHT: 64 IN | SYSTOLIC BLOOD PRESSURE: 156 MMHG | RESPIRATION RATE: 18 BRPM | WEIGHT: 166.8 LBS | BODY MASS INDEX: 28.48 KG/M2

## 2020-05-21 DIAGNOSIS — I12.9 BENIGN HYPERTENSION WITH CKD (CHRONIC KIDNEY DISEASE) STAGE III (HCC): ICD-10-CM

## 2020-05-21 DIAGNOSIS — E86.0 DEHYDRATION: ICD-10-CM

## 2020-05-21 DIAGNOSIS — N18.30 BENIGN HYPERTENSION WITH CKD (CHRONIC KIDNEY DISEASE) STAGE III (HCC): ICD-10-CM

## 2020-05-21 DIAGNOSIS — R40.0 SOMNOLENCE: Primary | ICD-10-CM

## 2020-05-21 DIAGNOSIS — N18.30 CHRONIC KIDNEY DISEASE (CKD), STAGE III (MODERATE) (HCC): Primary | ICD-10-CM

## 2020-05-21 DIAGNOSIS — E87.0 HYPERNATREMIA: ICD-10-CM

## 2020-05-21 LAB
SL AMB  POCT GLUCOSE, UA: NORMAL
SL AMB LEUKOCYTE ESTERASE,UA: NORMAL
SL AMB POCT BILIRUBIN,UA: NORMAL
SL AMB POCT BLOOD,UA: NORMAL
SL AMB POCT CLARITY,UA: CLEAR
SL AMB POCT COLOR,UA: NORMAL
SL AMB POCT KETONES,UA: NORMAL
SL AMB POCT NITRITE,UA: NORMAL
SL AMB POCT PH,UA: 7.5
SL AMB POCT SPECIFIC GRAVITY,UA: 1
SL AMB POCT URINE PROTEIN: NORMAL
SL AMB POCT UROBILINOGEN: 0.2

## 2020-05-21 PROCEDURE — 81002 URINALYSIS NONAUTO W/O SCOPE: CPT | Performed by: PHYSICIAN ASSISTANT

## 2020-05-21 PROCEDURE — 99213 OFFICE O/P EST LOW 20 MIN: CPT | Performed by: PHYSICIAN ASSISTANT

## 2020-05-21 PROCEDURE — 87086 URINE CULTURE/COLONY COUNT: CPT | Performed by: PHYSICIAN ASSISTANT

## 2020-05-21 PROCEDURE — G0463 HOSPITAL OUTPT CLINIC VISIT: HCPCS | Performed by: PHYSICIAN ASSISTANT

## 2020-05-22 LAB — BACTERIA UR CULT: NORMAL

## 2020-05-23 LAB
BUN SERPL-MCNC: 20 MG/DL (ref 7–25)
BUN/CREAT SERPL: 14 (CALC) (ref 6–22)
CALCIUM SERPL-MCNC: 9.1 MG/DL (ref 8.6–10.2)
CHLORIDE SERPL-SCNC: 111 MMOL/L (ref 98–110)
CO2 SERPL-SCNC: 22 MMOL/L (ref 20–32)
CREAT SERPL-MCNC: 1.38 MG/DL (ref 0.5–1.1)
CREAT UR-MCNC: 28 MG/DL (ref 20–275)
GLUCOSE SERPL-MCNC: 95 MG/DL (ref 65–99)
POTASSIUM SERPL-SCNC: 3.8 MMOL/L (ref 3.5–5.3)
PROT UR-MCNC: 4 MG/DL (ref 5–24)
PROT/CREAT UR: 0.14 MG/MG CREAT (ref 0.02–0.16)
PROT/CREAT UR: 143 MG/G CREAT (ref 21–161)
SL AMB EGFR AFRICAN AMERICAN: 53 ML/MIN/1.73M2
SL AMB EGFR NON AFRICAN AMERICAN: 45 ML/MIN/1.73M2
SODIUM SERPL-SCNC: 140 MMOL/L (ref 135–146)

## 2020-05-29 ENCOUNTER — TELEMEDICINE (OUTPATIENT)
Dept: NEPHROLOGY | Facility: CLINIC | Age: 45
End: 2020-05-29
Payer: MEDICARE

## 2020-05-29 DIAGNOSIS — E87.8 LOW BICARBONATE LEVEL: ICD-10-CM

## 2020-05-29 DIAGNOSIS — I12.9 BENIGN HYPERTENSION WITH CKD (CHRONIC KIDNEY DISEASE) STAGE III (HCC): Primary | ICD-10-CM

## 2020-05-29 DIAGNOSIS — E87.0 HYPERNATREMIA: ICD-10-CM

## 2020-05-29 DIAGNOSIS — N18.30 BENIGN HYPERTENSION WITH CKD (CHRONIC KIDNEY DISEASE) STAGE III (HCC): Primary | ICD-10-CM

## 2020-05-29 DIAGNOSIS — N18.30 CHRONIC KIDNEY DISEASE (CKD), STAGE III (MODERATE) (HCC): ICD-10-CM

## 2020-05-29 PROCEDURE — 99214 OFFICE O/P EST MOD 30 MIN: CPT | Performed by: INTERNAL MEDICINE

## 2020-06-13 ENCOUNTER — OFFICE VISIT (OUTPATIENT)
Dept: URGENT CARE | Facility: CLINIC | Age: 45
End: 2020-06-13
Payer: MEDICARE

## 2020-06-13 VITALS
BODY MASS INDEX: 29.35 KG/M2 | HEART RATE: 106 BPM | SYSTOLIC BLOOD PRESSURE: 148 MMHG | WEIGHT: 171 LBS | OXYGEN SATURATION: 97 % | DIASTOLIC BLOOD PRESSURE: 96 MMHG | RESPIRATION RATE: 18 BRPM | TEMPERATURE: 98.2 F

## 2020-06-13 DIAGNOSIS — S80.01XA CONTUSION OF RIGHT KNEE, INITIAL ENCOUNTER: Primary | ICD-10-CM

## 2020-06-13 PROCEDURE — 99213 OFFICE O/P EST LOW 20 MIN: CPT | Performed by: NURSE PRACTITIONER

## 2020-06-13 PROCEDURE — G0463 HOSPITAL OUTPT CLINIC VISIT: HCPCS | Performed by: NURSE PRACTITIONER

## 2020-07-07 LAB
BUN SERPL-MCNC: 15 MG/DL (ref 7–25)
BUN/CREAT SERPL: 11 (CALC) (ref 6–22)
CALCIUM SERPL-MCNC: 9.3 MG/DL (ref 8.6–10.2)
CHLORIDE SERPL-SCNC: 107 MMOL/L (ref 98–110)
CO2 SERPL-SCNC: 25 MMOL/L (ref 20–32)
CREAT SERPL-MCNC: 1.31 MG/DL (ref 0.5–1.1)
CREAT UR-MCNC: 37 MG/DL (ref 20–275)
ERYTHROCYTE [DISTWIDTH] IN BLOOD BY AUTOMATED COUNT: 12.1 % (ref 11–15)
GLUCOSE SERPL-MCNC: 90 MG/DL (ref 65–99)
HCT VFR BLD AUTO: 38.4 % (ref 35–45)
HGB BLD-MCNC: 12.6 G/DL (ref 11.7–15.5)
MAGNESIUM SERPL-MCNC: 1.9 MG/DL (ref 1.5–2.5)
MCH RBC QN AUTO: 31 PG (ref 27–33)
MCHC RBC AUTO-ENTMCNC: 32.8 G/DL (ref 32–36)
MCV RBC AUTO: 94.6 FL (ref 80–100)
PHOSPHATE SERPL-MCNC: 3.7 MG/DL (ref 2.5–4.5)
PLATELET # BLD AUTO: 264 THOUSAND/UL (ref 140–400)
PMV BLD REES-ECKER: 10.4 FL (ref 7.5–12.5)
POTASSIUM SERPL-SCNC: 4 MMOL/L (ref 3.5–5.3)
PROT UR-MCNC: <4 MG/DL (ref 5–24)
PROT/CREAT UR: ABNORMAL MG/G CREAT (ref 21–161)
PROT/CREAT UR: ABNORMAL MG/MG CREAT (ref 0.02–0.16)
PTH-INTACT SERPL-MCNC: 27 PG/ML (ref 14–64)
RBC # BLD AUTO: 4.06 MILLION/UL (ref 3.8–5.1)
SL AMB EGFR AFRICAN AMERICAN: 56 ML/MIN/1.73M2
SL AMB EGFR NON AFRICAN AMERICAN: 48 ML/MIN/1.73M2
SODIUM SERPL-SCNC: 137 MMOL/L (ref 135–146)
WBC # BLD AUTO: 4.3 THOUSAND/UL (ref 3.8–10.8)

## 2020-07-22 ENCOUNTER — OFFICE VISIT (OUTPATIENT)
Dept: OBGYN CLINIC | Facility: CLINIC | Age: 45
End: 2020-07-22
Payer: MEDICARE

## 2020-07-22 VITALS
HEIGHT: 63 IN | DIASTOLIC BLOOD PRESSURE: 76 MMHG | WEIGHT: 164 LBS | BODY MASS INDEX: 29.06 KG/M2 | SYSTOLIC BLOOD PRESSURE: 122 MMHG

## 2020-07-22 DIAGNOSIS — Z01.419 ROUTINE GYNECOLOGICAL EXAMINATION: Primary | ICD-10-CM

## 2020-07-22 DIAGNOSIS — Z12.31 SCREENING MAMMOGRAM FOR HIGH-RISK PATIENT: ICD-10-CM

## 2020-07-22 PROCEDURE — G0145 SCR C/V CYTO,THINLAYER,RESCR: HCPCS | Performed by: OBSTETRICS & GYNECOLOGY

## 2020-07-22 PROCEDURE — G0101 CA SCREEN;PELVIC/BREAST EXAM: HCPCS | Performed by: OBSTETRICS & GYNECOLOGY

## 2020-07-22 PROCEDURE — 87624 HPV HI-RISK TYP POOLED RSLT: CPT | Performed by: OBSTETRICS & GYNECOLOGY

## 2020-07-22 RX ORDER — MEDROXYPROGESTERONE ACETATE 150 MG/ML
150 INJECTION, SUSPENSION INTRAMUSCULAR
Qty: 1 ML | Refills: 4 | Status: SHIPPED | OUTPATIENT
Start: 2020-07-22 | End: 2020-10-29 | Stop reason: SDUPTHER

## 2020-07-22 NOTE — PROGRESS NOTES
Assessment/Plan:  23-year-old female presents for annual visit  Normal gyn exam  Pap and HPV testing performed today  Recommend screening mammogram, order generated  No contraindications to continue Depo-Provera to produce amenorrhea  Patient is in a group home, nonverbal   Colonoscopy screening starting at age 48  Follow-up in 2 years or as needed  Diagnoses and all orders for this visit:    Routine gynecological examination  -     Liquid-based pap, screening  -     medroxyPROGESTERone (DEPO-PROVERA) 150 mg/mL injection; Inject 1 mL (150 mg total) into a muscle every 3 (three) months    Screening mammogram for high-risk patient  -     Mammo screening bilateral w 3d & cad; Future          Subjective:      Patient ID: Simin Ortez is a 50 y o  female  23-year-old  female who is amenorrheic secondary to Depo-Provera presents with her caregiver for her annual visit  Patient is mentally challenged and nonverbal   Her caregiver reports no gyn complaints for her  The following portions of the patient's history were reviewed and updated as appropriate: allergies, current medications, past family history, past medical history, past social history, past surgical history and problem list     Review of Systems   Constitutional: Negative for appetite change, fatigue and unexpected weight change  Respiratory: Negative for cough and wheezing  Cardiovascular: Negative for leg swelling  Gastrointestinal: Negative for abdominal distention, abdominal pain, anal bleeding, blood in stool, constipation and diarrhea  Genitourinary: Negative for difficulty urinating, dysuria, frequency and urgency  Musculoskeletal: Negative for arthralgias and myalgias  Psychiatric/Behavioral: Negative for sleep disturbance           Objective:      /76 (BP Location: Left arm, Patient Position: Sitting, Cuff Size: Standard)   Ht 5' 3" (1 6 m)   Wt 74 4 kg (164 lb)   LMP  (LMP Unknown)   BMI 29 05 kg/m² Physical Exam   Constitutional: She appears well-developed and well-nourished  Overweight, BMI is 29   Neck: No thyromegaly present  Cardiovascular: Normal rate, regular rhythm and normal heart sounds  No murmur heard  Pulmonary/Chest: Effort normal and breath sounds normal  Right breast exhibits no inverted nipple, no mass, no nipple discharge, no skin change and no tenderness  Left breast exhibits no inverted nipple, no mass, no nipple discharge, no skin change and no tenderness  No breast swelling, tenderness, discharge or bleeding  Breasts are symmetrical    Abdominal: Soft  Bowel sounds are normal  She exhibits no distension  There is no tenderness  Hernia confirmed negative in the right inguinal area and confirmed negative in the left inguinal area  Genitourinary: Vagina normal and uterus normal  No breast swelling, tenderness, discharge or bleeding  There is no rash or tenderness on the right labia  There is no rash or tenderness on the left labia  Cervix exhibits no motion tenderness, no discharge and no friability  Right adnexum displays no mass, no tenderness and no fullness  Left adnexum displays no mass  No vaginal discharge found  Lymphadenopathy: No inguinal adenopathy noted on the right or left side  Neurological: She is alert  Skin: Skin is warm and dry  Vitals reviewed

## 2020-07-29 ENCOUNTER — TELEPHONE (OUTPATIENT)
Dept: NEUROLOGY | Facility: CLINIC | Age: 45
End: 2020-07-29

## 2020-07-29 ENCOUNTER — TELEMEDICINE (OUTPATIENT)
Dept: NEUROLOGY | Facility: CLINIC | Age: 45
End: 2020-07-29
Payer: MEDICARE

## 2020-07-29 VITALS
HEIGHT: 63 IN | BODY MASS INDEX: 29.06 KG/M2 | DIASTOLIC BLOOD PRESSURE: 78 MMHG | HEART RATE: 79 BPM | WEIGHT: 164 LBS | TEMPERATURE: 97.6 F | SYSTOLIC BLOOD PRESSURE: 136 MMHG

## 2020-07-29 DIAGNOSIS — G40.209 PARTIAL SYMPTOMATIC EPILEPSY WITH COMPLEX PARTIAL SEIZURES, NOT INTRACTABLE, WITHOUT STATUS EPILEPTICUS (HCC): ICD-10-CM

## 2020-07-29 LAB
HPV HR 12 DNA CVX QL NAA+PROBE: NEGATIVE
HPV16 DNA CVX QL NAA+PROBE: NEGATIVE
HPV18 DNA CVX QL NAA+PROBE: NEGATIVE

## 2020-07-29 PROCEDURE — 99441 PR PHYS/QHP TELEPHONE EVALUATION 5-10 MIN: CPT | Performed by: PSYCHIATRY & NEUROLOGY

## 2020-07-29 RX ORDER — CARBAMAZEPINE 200 MG/1
400 TABLET, EXTENDED RELEASE ORAL 2 TIMES DAILY
Qty: 124 TABLET | Refills: 5 | Status: SHIPPED | OUTPATIENT
Start: 2020-07-29 | End: 2020-09-09 | Stop reason: SDUPTHER

## 2020-07-29 RX ORDER — TOPIRAMATE 50 MG/1
50 TABLET, FILM COATED ORAL 2 TIMES DAILY
Qty: 60 TABLET | Refills: 5 | Status: SHIPPED | OUTPATIENT
Start: 2020-07-29 | End: 2020-09-09 | Stop reason: SDUPTHER

## 2020-07-29 NOTE — PROGRESS NOTES
[place] [address] [facility]  Follow Up Visit    Impression/Plan    Ms Maximo Schultz is a 50 y o  female with epilepsy, likely focal due to childhood injury, but most recent EEG (slowing) and MRI unrevealing  Seizures currently well controlled, with seizure freedom for greater than 6 years  CKD and low bicarb are followed by nephrology  Topiramate may be contributing to metabolic acidosis (proximal RTA due to topiramate)  Will continue slow transition to carbamazepine monotherapy  Patient Instructions   1  Decrease topiramate to 50 mg twice daily  2  Continue carbamazepine unchanged  3  Let us know if there are seizures  4  Return in 4 months to see [employer], CRNP  Return in 10 months to see Dr Karine Mitchell  5  Will consider stopping topiramate at next visit  Diagnoses and all orders for this visit:    Partial symptomatic epilepsy with complex partial seizures, not intractable, without status epilepticus (HCC)  -     topiramate (TOPAMAX) 50 MG tablet; Take 1 tablet (50 mg total) by mouth 2 (two) times a day  -     carBAMazepine (TEGretol XR) 200 mg 12 hr tablet; Take 2 tablets (400 mg total) by mouth 2 (two) times a day        Will    Krista Orozco is returning to the [place] [address] [facility] for follow up  Her history includes PDD, MR, occipital brain injury and seizures  There was a severe head injury at 18 months  She is nonverbal and a caregiver assists in providing history  She was previously followed by Dr Eliel Jolly  Past notes describe her seizures as violent shaking with foaming at the mouth that can be preceded by a loud scream  Past events tend to occur at night  There has been urinary incontinence  Interval Events:   Seizures since last visit: None (Her last seizure occurred near 2015 [place] [place] and may have been provoked by stress or activity  )    Phone call visit with caregiver in same room as patient  Decreased topiramate after last visit  She was having more trouble with her balance  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  2/21/2020 she fell outside and was seen at [facility]  Head CT normal      Since quarantine she hasn't been out and there haven't been significant problems with balance or falls  6/13/2020 fell and had contusion on knee when she was running down a ramp  Current AEDs:  Tegretol  mg twice daily  Clonazepam 0 5 mg tid  Topiramate 100 mg bid     Medication side effects:  None known  Medication adherence: Yes    Other medications include:  Depo-Provera    Event/Seizure semiology:  Violent shaking with foaming at the mouth that can be preceded by a loud scream    Special Features  Status epilepticus:  Not known  Self Injury Seizures:  None known  Precipitating Factors: Stress    Epilepsy Risk Factors:  Intellectual disability  Head injury (moderate/severe)    Prior AEDs:  unknown    Prior Evaluation:  MRI in 2008 was read as normal  Dr La Nena Vital last note indicates that her last EEG was unrevealing for source and no focal discharges noted  There was generalized encephalopathy  History Reviewed: The following were reviewed and updated as appropriate: past medical history, past social history and problem list    Psychiatric History:  Followed by psychiatry, multiple psychiatric medications  Behavioral outbursts  Social History:   Driving: No  Lives Alone: No  Occupation: on permanent disability    ROS:  Constitutional: Negative  Negative for appetite change and fever  HENT: Negative  Negative for hearing loss, tinnitus, trouble swallowing and voice change  Eyes: Negative  Negative for photophobia and pain  Respiratory: Negative  Negative for shortness of breath  Cardiovascular: Negative  Negative for palpitations  Gastrointestinal: Negative  Negative for nausea and vomiting  Endocrine: Negative    Negative for cold intolerance  Genitourinary: Negative  Negative for dysuria, frequency and urgency  Musculoskeletal: Negative  Negative for myalgias and neck pain  Skin: Negative  Negative for rash  Allergic/Immunologic: Positive for food allergies ( nuts)  Neurological: Negative  Negative for dizziness, tremors, seizures, syncope, facial asymmetry, speech difficulty, weakness, light-headedness, numbness and headaches  Hematological: Negative  Does not bruise/bleed easily  Psychiatric/Behavioral: Negative  Negative for confusion, hallucinations and sleep disturbance  ROS reviewed and updated as appropriate  Objective    /78 (Patient Position: Sitting)   Pulse 79   Temp 97 6 °F (36 4 °C)   Ht 5' 3" (1 6 m)   Wt 74 4 kg (164 lb)   LMP  (LMP Unknown)   BMI 29 05 kg/m²            ==============================================================     Virtual Brief Visit        Problem List Items Addressed This Visit        Nervous and Auditory    Partial symptomatic epilepsy (HCC)    Relevant Medications    topiramate (TOPAMAX) 50 MG tablet    carBAMazepine (TEGretol XR) 200 mg 12 hr tablet                Reason for visit is   Chief Complaint   Patient presents with    Seizures    Virtual Brief Visit        Encounter provider Tyra Keith MD    Provider located at [address] [place] [address]    Recent Visits  No visits were found meeting these conditions     Showing recent visits within past 7 days and meeting all other requirements     Today's Visits  Date Type Provider Dept   07/29/20 Telephone [facility] [address]   07/29/20 [address] [place] [address], [address] [place] [address] today's visits and meeting all other requirements     Future Appointments  Date Type Provider Dept   07/29/20 Telephone Kmi Castaneda MA [facility]   Showing future appointments within next 150 days and meeting all other requirements        After connecting through telephone, the patient was identified by name and date of birth  Gurdeep Dc was informed that this is a telemedicine visit and that the visit is being conducted through telephone  My office door was closed  No one else was in the room  She acknowledged consent and understanding of privacy and security of the platform  The patient has agreed to participate and understands she can discontinue the visit at any time  Patient is aware this is a billable service  Total time with patient today: 10 minutes  Greater than 50% of total time was spent with the patient and / or family counseling and / or coordination of care  A description of the counseling / coordination of care: discussed impression/plan in detail  See above  VIRTUAL VISIT DISCLAIMER    Gurdeep Dc acknowledges that she has consented to an online visit or consultation  She understands that the online visit is based solely on information provided by her, and that, in the absence of a face-to-face physical evaluation by the physician, the diagnosis she receives is both limited and provisional in terms of accuracy and completeness  This is not intended to replace a full medical face-to-face evaluation by the physician  Gurdeep Dao understands and accepts these terms

## 2020-07-29 NOTE — PROGRESS NOTES
Patient ID: Radha Espinoza is a 50 y o  female  Assessment/Plan:    No problem-specific Assessment & Plan notes found for this encounter  {Assess/PlanSmartLinks:81033}       Subjective:    HPI    {[facility] HPI texts:61425}    {Common ambulatory SmartLinks:98627}         Objective:    Blood pressure 136/78, pulse 79, temperature 97 6 °F (36 4 °C), height 5' 3" (1 6 m), weight 74 4 kg (164 lb)  Physical Exam    Neurological Exam      ROS:    Review of Systems   Constitutional: Negative  Negative for appetite change and fever  HENT: Negative  Negative for hearing loss, tinnitus, trouble swallowing and voice change  Eyes: Negative  Negative for photophobia and pain  Respiratory: Negative  Negative for shortness of breath  Cardiovascular: Negative  Negative for palpitations  Gastrointestinal: Negative  Negative for nausea and vomiting  Endocrine: Negative  Negative for cold intolerance  Genitourinary: Negative  Negative for dysuria, frequency and urgency  Musculoskeletal: Negative  Negative for myalgias and neck pain  Skin: Negative  Negative for rash  Allergic/Immunologic: Positive for food allergies ( nuts)  Neurological: Negative  Negative for dizziness, tremors, seizures, syncope, facial asymmetry, speech difficulty, weakness, light-headedness, numbness and headaches  Hematological: Negative  Does not bruise/bleed easily  Psychiatric/Behavioral: Negative  Negative for confusion, hallucinations and sleep disturbance

## 2020-07-30 LAB
LAB AP GYN PRIMARY INTERPRETATION: NORMAL
Lab: NORMAL

## 2020-08-03 ENCOUNTER — CLINICAL SUPPORT (OUTPATIENT)
Dept: OBGYN CLINIC | Facility: CLINIC | Age: 45
End: 2020-08-03
Payer: MEDICARE

## 2020-08-03 DIAGNOSIS — Z30.42 ENCOUNTER FOR SURVEILLANCE OF INJECTABLE CONTRACEPTIVE: Primary | ICD-10-CM

## 2020-08-03 PROCEDURE — 96372 THER/PROPH/DIAG INJ SC/IM: CPT

## 2020-08-03 RX ORDER — MEDROXYPROGESTERONE ACETATE 150 MG/ML
150 INJECTION, SUSPENSION INTRAMUSCULAR ONCE
Status: COMPLETED | OUTPATIENT
Start: 2020-08-03 | End: 2020-08-03

## 2020-08-03 RX ADMIN — MEDROXYPROGESTERONE ACETATE 150 MG: 150 INJECTION, SUSPENSION INTRAMUSCULAR at 13:27

## 2020-08-15 ENCOUNTER — OFFICE VISIT (OUTPATIENT)
Dept: URGENT CARE | Facility: CLINIC | Age: 45
End: 2020-08-15
Payer: MEDICARE

## 2020-08-15 VITALS
SYSTOLIC BLOOD PRESSURE: 108 MMHG | RESPIRATION RATE: 16 BRPM | DIASTOLIC BLOOD PRESSURE: 82 MMHG | HEART RATE: 83 BPM | WEIGHT: 164 LBS | BODY MASS INDEX: 29.06 KG/M2 | HEIGHT: 63 IN | OXYGEN SATURATION: 98 % | TEMPERATURE: 97.2 F

## 2020-08-15 DIAGNOSIS — S40.211A ABRASION OF RIGHT SHOULDER, INITIAL ENCOUNTER: Primary | ICD-10-CM

## 2020-08-15 DIAGNOSIS — T14.8XXA ABRASION: ICD-10-CM

## 2020-08-15 PROCEDURE — G0463 HOSPITAL OUTPT CLINIC VISIT: HCPCS | Performed by: PHYSICIAN ASSISTANT

## 2020-08-15 PROCEDURE — 99213 OFFICE O/P EST LOW 20 MIN: CPT | Performed by: PHYSICIAN ASSISTANT

## 2020-09-09 DIAGNOSIS — G40.209 PARTIAL SYMPTOMATIC EPILEPSY WITH COMPLEX PARTIAL SEIZURES, NOT INTRACTABLE, WITHOUT STATUS EPILEPTICUS (HCC): ICD-10-CM

## 2020-09-10 RX ORDER — TOPIRAMATE 50 MG/1
50 TABLET, FILM COATED ORAL 2 TIMES DAILY
Qty: 180 TABLET | Refills: 3 | Status: SHIPPED | OUTPATIENT
Start: 2020-09-10 | End: 2021-01-11 | Stop reason: SDUPTHER

## 2020-09-10 RX ORDER — CARBAMAZEPINE 200 MG/1
400 TABLET, EXTENDED RELEASE ORAL 2 TIMES DAILY
Qty: 360 TABLET | Refills: 3 | Status: SHIPPED | OUTPATIENT
Start: 2020-09-10 | End: 2021-04-26 | Stop reason: SDUPTHER

## 2020-09-10 NOTE — TELEPHONE ENCOUNTER
pharm called regarding topamax script  she states that script that was received today is for 1 tab bid but script that was transfered from prior pharm had instructions as 2 tabs bid  per office note-Decrease topiramate to 50 mg twice daily     made pharm aware of this

## 2020-10-10 ENCOUNTER — OFFICE VISIT (OUTPATIENT)
Dept: URGENT CARE | Facility: CLINIC | Age: 45
End: 2020-10-10
Payer: MEDICARE

## 2020-10-10 VITALS
RESPIRATION RATE: 18 BRPM | DIASTOLIC BLOOD PRESSURE: 76 MMHG | HEART RATE: 82 BPM | HEIGHT: 64 IN | OXYGEN SATURATION: 99 % | BODY MASS INDEX: 28 KG/M2 | TEMPERATURE: 97.7 F | WEIGHT: 164 LBS | SYSTOLIC BLOOD PRESSURE: 134 MMHG

## 2020-10-10 DIAGNOSIS — H10.32 ACUTE BACTERIAL CONJUNCTIVITIS OF LEFT EYE: Primary | ICD-10-CM

## 2020-10-10 PROCEDURE — G0463 HOSPITAL OUTPT CLINIC VISIT: HCPCS | Performed by: PHYSICIAN ASSISTANT

## 2020-10-10 PROCEDURE — 99213 OFFICE O/P EST LOW 20 MIN: CPT | Performed by: PHYSICIAN ASSISTANT

## 2020-10-10 RX ORDER — TOBRAMYCIN 3 MG/ML
1-2 SOLUTION/ DROPS OPHTHALMIC
Qty: 1 BOTTLE | Refills: 0 | Status: SHIPPED | OUTPATIENT
Start: 2020-10-10 | End: 2020-10-10

## 2020-10-10 RX ORDER — TOBRAMYCIN 3 MG/ML
1-2 SOLUTION/ DROPS OPHTHALMIC
Qty: 1 BOTTLE | Refills: 0 | Status: SHIPPED | OUTPATIENT
Start: 2020-10-10 | End: 2020-10-17

## 2020-10-29 DIAGNOSIS — Z01.419 ROUTINE GYNECOLOGICAL EXAMINATION: ICD-10-CM

## 2020-10-29 RX ORDER — MEDROXYPROGESTERONE ACETATE 150 MG/ML
150 INJECTION, SUSPENSION INTRAMUSCULAR
Qty: 1 ML | Refills: 4 | Status: SHIPPED | OUTPATIENT
Start: 2020-10-29 | End: 2020-10-30 | Stop reason: SDUPTHER

## 2020-10-30 DIAGNOSIS — Z01.419 ROUTINE GYNECOLOGICAL EXAMINATION: ICD-10-CM

## 2020-10-30 RX ORDER — MEDROXYPROGESTERONE ACETATE 150 MG/ML
150 INJECTION, SUSPENSION INTRAMUSCULAR
Qty: 1 ML | Refills: 4 | Status: SHIPPED | OUTPATIENT
Start: 2020-10-30 | End: 2022-03-18 | Stop reason: SDUPTHER

## 2020-11-02 ENCOUNTER — TELEPHONE (OUTPATIENT)
Dept: NEUROLOGY | Facility: CLINIC | Age: 45
End: 2020-11-02

## 2020-11-03 ENCOUNTER — CLINICAL SUPPORT (OUTPATIENT)
Dept: OBGYN CLINIC | Facility: CLINIC | Age: 45
End: 2020-11-03
Payer: MEDICARE

## 2020-11-03 VITALS
DIASTOLIC BLOOD PRESSURE: 62 MMHG | BODY MASS INDEX: 27.49 KG/M2 | WEIGHT: 161 LBS | SYSTOLIC BLOOD PRESSURE: 110 MMHG | TEMPERATURE: 95.7 F | HEIGHT: 64 IN

## 2020-11-03 DIAGNOSIS — Z30.42 ENCOUNTER FOR SURVEILLANCE OF INJECTABLE CONTRACEPTIVE: Primary | ICD-10-CM

## 2020-11-03 PROCEDURE — 96372 THER/PROPH/DIAG INJ SC/IM: CPT | Performed by: OBSTETRICS & GYNECOLOGY

## 2020-11-09 RX ORDER — MEDROXYPROGESTERONE ACETATE 150 MG/ML
150 INJECTION, SUSPENSION INTRAMUSCULAR ONCE
Status: COMPLETED | OUTPATIENT
Start: 2020-11-09 | End: 2021-01-19

## 2020-11-10 ENCOUNTER — TELEPHONE (OUTPATIENT)
Dept: NEPHROLOGY | Facility: CLINIC | Age: 45
End: 2020-11-10

## 2020-11-10 DIAGNOSIS — N18.30 STAGE 3 CHRONIC KIDNEY DISEASE, UNSPECIFIED WHETHER STAGE 3A OR 3B CKD (HCC): Primary | ICD-10-CM

## 2020-11-11 LAB
BUN SERPL-MCNC: 19 MG/DL (ref 7–25)
BUN/CREAT SERPL: 15 (CALC) (ref 6–22)
CALCIUM SERPL-MCNC: 9.6 MG/DL (ref 8.6–10.2)
CHLORIDE SERPL-SCNC: 111 MMOL/L (ref 98–110)
CO2 SERPL-SCNC: 24 MMOL/L (ref 20–32)
CREAT SERPL-MCNC: 1.24 MG/DL (ref 0.5–1.1)
GLUCOSE SERPL-MCNC: 83 MG/DL (ref 65–99)
POTASSIUM SERPL-SCNC: 4 MMOL/L (ref 3.5–5.3)
SL AMB EGFR AFRICAN AMERICAN: 60 ML/MIN/1.73M2
SL AMB EGFR NON AFRICAN AMERICAN: 52 ML/MIN/1.73M2
SODIUM SERPL-SCNC: 142 MMOL/L (ref 135–146)

## 2020-11-12 ENCOUNTER — OFFICE VISIT (OUTPATIENT)
Dept: NEPHROLOGY | Facility: CLINIC | Age: 45
End: 2020-11-12
Payer: MEDICARE

## 2020-11-12 VITALS
DIASTOLIC BLOOD PRESSURE: 76 MMHG | WEIGHT: 159 LBS | HEIGHT: 64 IN | RESPIRATION RATE: 16 BRPM | BODY MASS INDEX: 27.14 KG/M2 | SYSTOLIC BLOOD PRESSURE: 136 MMHG | TEMPERATURE: 98.1 F

## 2020-11-12 DIAGNOSIS — I12.9 BENIGN HYPERTENSION WITH CKD (CHRONIC KIDNEY DISEASE) STAGE III (HCC): Primary | ICD-10-CM

## 2020-11-12 DIAGNOSIS — N18.30 BENIGN HYPERTENSION WITH CKD (CHRONIC KIDNEY DISEASE) STAGE III (HCC): Primary | ICD-10-CM

## 2020-11-12 DIAGNOSIS — E87.8 LOW BICARBONATE LEVEL: ICD-10-CM

## 2020-11-12 DIAGNOSIS — N18.30 STAGE 3 CHRONIC KIDNEY DISEASE, UNSPECIFIED WHETHER STAGE 3A OR 3B CKD (HCC): ICD-10-CM

## 2020-11-12 PROCEDURE — 99214 OFFICE O/P EST MOD 30 MIN: CPT | Performed by: INTERNAL MEDICINE

## 2021-01-11 ENCOUNTER — OFFICE VISIT (OUTPATIENT)
Dept: NEUROLOGY | Facility: CLINIC | Age: 46
End: 2021-01-11
Payer: MEDICARE

## 2021-01-11 VITALS
BODY MASS INDEX: 27.21 KG/M2 | SYSTOLIC BLOOD PRESSURE: 126 MMHG | HEART RATE: 92 BPM | DIASTOLIC BLOOD PRESSURE: 70 MMHG | WEIGHT: 158.5 LBS

## 2021-01-11 DIAGNOSIS — G40.209 PARTIAL SYMPTOMATIC EPILEPSY WITH COMPLEX PARTIAL SEIZURES, NOT INTRACTABLE, WITHOUT STATUS EPILEPTICUS (HCC): ICD-10-CM

## 2021-01-11 DIAGNOSIS — R56.9 SEIZURE (HCC): Primary | ICD-10-CM

## 2021-01-11 PROCEDURE — 99213 OFFICE O/P EST LOW 20 MIN: CPT | Performed by: NURSE PRACTITIONER

## 2021-01-11 RX ORDER — TOPIRAMATE 25 MG/1
TABLET ORAL
Qty: 40 TABLET | Refills: 0 | Status: SHIPPED | OUTPATIENT
Start: 2021-01-11 | End: 2021-04-26 | Stop reason: ALTCHOICE

## 2021-01-11 RX ORDER — CALCIUM CARBONATE/VITAMIN D3 500MG-5MCG
TABLET ORAL
COMMUNITY

## 2021-01-11 NOTE — PROGRESS NOTES
Patient ID: Malcom Harvey is a 50 y o  female with likely focal epilepsy dur to childhood brain injury, who is returning to Neurology office for follow up of her seizures  Assessment/Plan:    Partial symptomatic epilepsy ([address] )  Patient continues to be seizure free during transition off of topiramate with the addition of carbamazepine XR due to possibility of topiramate contributing to metabolic acidosis  She continues to follow closely with nephrology  No changes in mood or behaviors with transition  Sodium remains stable and WNL  Most recent carbamazepine level in March 2020 was 7 3  Neurological exam was extremely limited today due to patient's agitation though she was able to give high fives with both hands on command and her gait is wide based but stable  Since she continues to do well, will continue with transition off of topiramate  Currently on 50 mg twice per day  Will decreased to 25 mg twice per day for two weeks, then 12 5 mg twice per day for two weeks, then stop  Carbamazepine level and BMP ordered to be done prior to next visit  Caretakers will call the office with any breakthrough seizures or concerns prior to next visit  Follow up in March as scheduled with Dr Sharan Caruso  Subjective:  Ms Nessa Bailey is a 50 y o  female with epilepsy, likely focal due to childhood injury, but most recent EEG (slowing) and MRI unrevealing  Her history includes PDD, MR, HTN, occipital brain injury (severe at 25 months old) and seizures  She was last seen by Dr Sharan Caruso on 07/29/2020 via telemedicine  At that time, seizures were well controlled, last seizure greater tahn 6 years ago  It was noted that CKD and low bicarb were followed by nephrology with topiramate possibly contributing to metabolic acidosis (proximal RTA due to topiramate)  Plan was to continue with slow transition to carbamazpine monotherapy, consider stopping topiramate at next visit  Since her last visit, she has been doing well  There have not been any seizures during the transition  They have not noticed any mood changes or concerns regarding her behavior  No notable side effects from any of her medications  No recent issues with balance or falls  No changes from baseline level of functioning  No new medical issues or concerns  Overall she has been stable  Current seizure medications:  - topiramate 50 mg twice per day  - carbamazepine (Tegretol XR) 400 mg twice per day  - clonazepam 0 5 mg three times per day  Other medications as per [facility]  Results for Ruchi Herman (MRN [id number])    Ref  Range 3/2/2020 07:41   CARBAMAZEPINE LEVEL Latest Ref Range: 4 0 - 12 0 mg/L  7 3       Results for Ruchi Herman (MRN [id number])    Ref  Range 7/6/2020 07:39 7/6/2020 07:39 7/22/2020 11:32 11/11/2020 06:57   Sodium Latest Ref Range: 135 - 146 mmol/L 137   142   Potassium Latest Ref Range: 3 5 - 5 3 mmol/L 4 0   4 0   Chloride Latest Ref Range: 98 - 110 mmol/L 107   111 (H)   CO2 Latest Ref Range: 20 - 32 mmol/L 25   24   BUN Latest Ref Range: 7 - 25 mg/dL 15   19   Creatinine Latest Ref Range: 0 50 - 1 10 mg/dL 1 31 (H)   1 24 (H)   SL AMB BUN/CREATININE RATIO Latest Ref Range: 6 - 22 (calc) 11   15   Glucose, Random Latest Ref Range: 65 - 99 mg/dL 90   83   Calcium Latest Ref Range: 8 6 - 10 2 mg/dL 9 3   9 6   eGFR African American Latest Ref Range: > OR = 60 mL/min/1 73m2 56 (L)   60   eGFR Non  Latest Ref Range: > OR = 60 mL/min/1 73m2 48 (L)   52 (L)   Phosphorus Latest Ref Range: 2 5 - 4 5 mg/dL 3 7      Magnesium Latest Ref Range: 1 5 - 2 5 mg/dL 1 9      White Blood Cell Count Latest Ref Range: 3 8 - 10 8 Thousand/uL 4 3      Red Blood Cell Count Latest Ref Range: 3 80 - 5 10 Million/uL 4 06      Hemoglobin Latest Ref Range: 11 7 - 15 5 g/dL 12 6      HCT Latest Ref Range: 35 0 - 45 0 % 38 4      MCV Latest Ref Range: 80 0 - 100 0 fL 94 6      MCH Latest Ref Range: 27 0 - 33 0 pg 31 0      MCHC   Latest Ref Range: 32 0 - 36 0 g/dL 32 8      RDW Latest Ref Range: 11 0 - 15 0 % 12 1      Platelet Count Latest Ref Range: 140 - 400 Thousand/uL 264          Event/Seizure semiology:  Violent shaking with foaming at the mouth that can be preceded by a loud scream     Special Features  Status epilepticus:  Not known  Self Injury Seizures:  None known  Precipitating Factors: Stress     Epilepsy Risk Factors:  Intellectual disability  Head injury (moderate/severe)     Prior AEDs:  unknown     Prior Evaluation:  MRI in 2008 was read as normal  Dr Edna Veras last note indicates that her last EEG was unrevealing for source and no focal discharges noted  There was generalized encephalopathy      History Reviewed: The following were reviewed and updated as appropriate: past medical history, past social history and problem list     Psychiatric History:  Followed by psychiatry, multiple psychiatric medications  Behavioral outbursts       Social History:   Driving: No  Lives Alone: No  Occupation: on permanent disability    Her history was also obtained from her caretaker, who was present at today's visit  I reviewed prior neurology notes, as documented in [employer], and summarized above  The following portions of the patient's history were reviewed and updated as appropriate: allergies, current medications, past family history, past medical history, past social history, past surgical history and problem list      Objective:    Blood pressure 126/70, pulse 92, weight 71 9 kg (158 lb 8 oz)  Physical Exam  No apparent distress  Appears well nourished  Agitated during exam  Quick movements     Neurologic Exam  Mental status- alert, non verbal  May follow simple, one step commands    Cranial Nerves- tracks examiner in room, facial muscles symmetric, shoulder shrug symmetrical     Motor- Not able to formally assess  Moves all extremities freely  Sensory-  Withdraws to light touch       DTRs- unable to assess    Gait- wide based, steady casual gait    Coordination- unable to assess FNF but patient able to give high fives with both hands  ROS:  Review of Systems   Constitutional: Negative  Negative for appetite change and fever  HENT: Negative  Negative for hearing loss, tinnitus, trouble swallowing and voice change  Eyes: Negative  Negative for photophobia and pain  Respiratory: Negative  Negative for shortness of breath  Cardiovascular: Negative  Negative for palpitations  Gastrointestinal: Negative  Negative for nausea and vomiting  Endocrine: Negative  Negative for cold intolerance  Genitourinary: Negative  Negative for dysuria, frequency and urgency  Musculoskeletal: Negative  Negative for myalgias and neck pain  Skin: Negative  Negative for rash  Neurological: Positive for seizures (none in many years)  Negative for dizziness, tremors, syncope, facial asymmetry, speech difficulty, weakness, light-headedness, numbness and headaches  Hematological: Negative  Does not bruise/bleed easily  Psychiatric/Behavioral: Negative  Negative for confusion, hallucinations and sleep disturbance  ROS obtained by MA and reviewed by myself

## 2021-01-11 NOTE — ASSESSMENT & PLAN NOTE
Patient continues to be seizure free during transition off of topiramate with the addition of carbamazepine XR due to possibility of topiramate contributing to metabolic acidosis  She continues to follow closely with nephrology  No changes in mood or behaviors with transition  Sodium remains stable and WNL  Most recent carbamazepine level in March 2020 was 7 3  Neurological exam was extremely limited today due to patient's agitation though she was able to give high fives with both hands on command and her gait is wide based but stable  Since she continues to do well, will continue with transition off of topiramate  Currently on 50 mg twice per day  Will decreased to 25 mg twice per day for two weeks, then 12 5 mg twice per day for two weeks, then stop  Carbamazepine level and BMP ordered to be done prior to next visit  Caretakers will call the office with any breakthrough seizures or concerns prior to next visit  Follow up in March as scheduled with Dr Herb Galeas

## 2021-01-11 NOTE — PROGRESS NOTES
Review of Systems   Constitutional: Negative  Negative for appetite change and fever  HENT: Negative  Negative for hearing loss, tinnitus, trouble swallowing and voice change  Eyes: Negative  Negative for photophobia and pain  Respiratory: Negative  Negative for shortness of breath  Cardiovascular: Negative  Negative for palpitations  Gastrointestinal: Negative  Negative for nausea and vomiting  Endocrine: Negative  Negative for cold intolerance  Genitourinary: Negative  Negative for dysuria, frequency and urgency  Musculoskeletal: Negative  Negative for myalgias and neck pain  Skin: Negative  Negative for rash  Neurological: Positive for seizures  Negative for dizziness, tremors, syncope, facial asymmetry, speech difficulty, weakness, light-headedness, numbness and headaches  Hematological: Negative  Does not bruise/bleed easily  Psychiatric/Behavioral: Negative  Negative for confusion, hallucinations and sleep disturbance

## 2021-01-11 NOTE — PATIENT INSTRUCTIONS
1  Continue current dose of carbamazepine (Tegretol XR)- 400 mg twice per day  2  Decrease topiramate to 25 mg tablets:    -One tablet twice per day for two weeks  Then half a tablet twice per day for two weeks  Then stop  3  Call the office with any seizures or concerns     4  Follow up in March with Dr Ángel Montana or sooner if needed    Have blood work done prior to appointment with Dr Ángel Montana

## 2021-01-18 ENCOUNTER — OFFICE VISIT (OUTPATIENT)
Dept: URGENT CARE | Facility: CLINIC | Age: 46
End: 2021-01-18
Payer: MEDICARE

## 2021-01-18 VITALS
DIASTOLIC BLOOD PRESSURE: 88 MMHG | HEART RATE: 86 BPM | HEIGHT: 64 IN | BODY MASS INDEX: 26.29 KG/M2 | TEMPERATURE: 97 F | SYSTOLIC BLOOD PRESSURE: 132 MMHG | WEIGHT: 154 LBS

## 2021-01-18 DIAGNOSIS — T50.901A ACCIDENTAL DRUG INGESTION, INITIAL ENCOUNTER: Primary | ICD-10-CM

## 2021-01-18 PROCEDURE — G0463 HOSPITAL OUTPT CLINIC VISIT: HCPCS | Performed by: NURSE PRACTITIONER

## 2021-01-18 PROCEDURE — 99213 OFFICE O/P EST LOW 20 MIN: CPT | Performed by: NURSE PRACTITIONER

## 2021-01-18 NOTE — PATIENT INSTRUCTIONS
Increase fluid intake  Monitor for safety today  Assist with walking and daily activities  Follow up with the primary care provider as needed    Medication Safety for Older Adults   WHAT YOU NEED TO KNOW:   Medication safety includes taking the right medicine, at the right time, and at the right dose  Medicines can affect older adults differently than they affect younger adults or children  Make sure anyone who helps you knows the safety information and specific instructions for all your medicines  DISCHARGE INSTRUCTIONS:   Call your local emergency number (911 in the [address] [place] [address],[address]) or have someone else call if:   · You have trouble breathing or you stop breathing  · You are unconscious or someone cannot get you to respond  · You have a seizure  Return to the emergency department if:   · You feel excitable and have a rapid heartbeat  · You have new or sudden wheezing  · You have mouth or throat swelling  · Your eyes are sunken, or your eyes and mouth are dry  Call your doctor if:   · You missed a dose of your medicine, or you took too much  · You have a rash, or your face or lips look swollen  · You have a headache or are dizzy  · You are confused or irritable  · You are vomiting  · You have diarrhea with blood in it  · You have questions or concerns about your condition or care  Common medicine challenges for older adults:   · Several medicines are taken within the same time period  This can cause any of the following challenges:     ? The medicines may need to be taken at different times of the day  Some may be taken with food, and others without food  ? Several medicines look alike  This can cause confusion if you are not sure which medicine you need to take at a certain time  You may accidentally take the wrong medicine, or forget to take a medicine  ? Certain medicines can increase or decrease the effects of other medicines   Some medicines can cause side effects as they interact  · Certain medicines are less safe for older adults  You may plan to take a medicine you have taken for years, not realizing it is less safe as you get older  An example is NSAIDs for pain and inflammation, such as ibuprofen or naproxen  NSAIDs can increase the risk for stomach bleeding  This problem can be worse for older adults  · Medicine labels can be hard to read  The label may have small print  The information may be confusing or hard to understand  · Care is received from several providers  You may have 1 primary care doctor but also receive care from specialists  Each healthcare provider may prescribe or recommend different medicines  · Medicines are needed for a short time after a surgery or procedure  The new medicines may interact with medicines you take every day  · Medicine side effects are not clear  Side effects can be overlooked because they are similar to effects from a medical condition you have  For example, you may often feel dizzy from high blood pressure and not recognize dizziness from a new medicine  General medicine safety guidelines:   · Take your medicine as directed  Do not split or crush pills unless directed  If you miss a dose, ask how to make up the missed dose  Do not try to make your medicine last longer by skipping doses  Do not take medicines for longer than needed  For example, an opioid prescribed for pain after surgery may not be needed after a few days  · Check the label each time before you take a medicine  Turn on a light to make sure you are taking the right medicine  Ask your healthcare provider or a pharmacist to explain anything on the label you do not understand  Use glasses or a magnifying glass if it is hard to read the label  Your pharmacy may also be able to give you large print labels  · Take the medicine at the right time  Be sure you understand how often you should or can take the medicine   Keep track of when you need the medicine and when you took it  You may want to use a reminder on your phone, or a timer  Make sure every person who helps you can access the tracking and timing information  · Keep medicines organized  ? Store medicines in the bottles they came in  Do not remove the labels  ? Do not dump all your pills into a bag or box  Many pills look alike, and it is important to keep them separate until used  You can move the pills from their bottles into a pill box to organize your daily medicines for the week  ? Do not remove pills from blister packs until you are ready to take them  ? Do not pour liquid medicines into jars or other containers  · Measure liquid medicine correctly  Use a tool specially made to measure liquid medicine  Examples are oral syringes and marked dosing spoons or cups  These tools can be found at a drugstore  Do not  use a kitchen teaspoon or tablespoon  They are not accurate, so you may get too much or too little of the medicine  · Store medicine properly  You may need to store medicine in a cool, dark, dry place  You may need to refrigerate it  Proper storage will prevent the medicine from breaking down or going bad before the expiration date  · Do not drink alcohol or use drugs  Alcohol and drugs can interact with your medicines  Your risk for falls is higher if you mix alcohol or drugs with certain medicines  Alcohol with prescription pain medicines can make you sleepy and slow your breathing rate  You may stop breathing completely  · Do not drive until you know how your medicines affect you  Some medicines may cause you to be drowsy or dizzy  · Never share medicines  Do not take any medicines prescribed for another person  Do not give any of your medicines to another person  What you need to know about prescription pain medicine safety:   · Do not suddenly stop taking prescription pain medicine    If you have been taking prescription pain medicine for longer than 2 weeks, a sudden stop may cause dangerous side effects  Ask your healthcare provider for more information before you stop taking your medicine  · Take your medicine as directed  Take only the amount prescribed or recommended by your healthcare provider  Too much medicine may cause breathing problems or other health issues  If you use a pain patch, be sure to remove the old patch before you place a new one  · Time your medicine correctly  Take your pain medicine 30 minutes before exercise or physical therapy  This helps decrease pain to help meet your treatment goals  You may need to take medicine before you go to bed  This may help you sleep and not be woken by pain  · Watch for side effects  Some foods, alcohol, and other medicines may cause side effects when you take pain medicine  Ask your healthcare provider how to prevent these problems  · Prevent constipation  Constipation is the most common side effect of prescription pain medicine  Eat foods high in fiber, such as raw fruit, vegetables, beans, and whole-grain bread and cereal  Ask your healthcare provider how much liquid to drink each day and which liquids are best for you  Exercise and activity may also help decrease the risk for constipation  · Follow instructions for what to do with medicine you do not use  Your healthcare provider will give you instructions for how to dispose of pain medicine safely  This helps make sure no one else takes the medicine  Make sure others have information about your medicines:   · Keep a list of your medicines  Give the list to anyone who helps care for you, and to all your healthcare providers  Include the medicine amounts, and when and why you take them  Include any vitamins, herbs, or supplements you take  Ask your doctor or pharmacist if a medicine could interact with any other medicine or with food   Do not start or stop any medicine unless your healthcare provider tells you it is okay  Update your list if you start or stop any medicine  · Use 1 pharmacy for all your prescriptions  Your pharmacy will keep a list of your medicines  When you get a new prescription, the pharmacist will check that it will not interact with your other medicines  Keep a copy of the list  Your pharmacist can tell you the side effects for each medicine, and how medicines taken together may affect you  If you take too much medicine or have an allergic reaction:  Call the [facility] immediately at [phone]  For more information:   · [address] [facility] , [facility]  Phone: [phone]  Web Address: [employer]     Follow up with your doctor as directed:  Write down your questions so you remember to ask them during your visits  © Copyright [address] Information is for End User's use only and may not be sold, redistributed or otherwise used for commercial purposes  All illustrations and images included in CareNotes® are the copyrighted property of [employer]  or [facility] Odilia Pryor   The above information is an  only  It is not intended as medical advice for individual conditions or treatments  Talk to your doctor, nurse or pharmacist before following any medical regimen to see if it is safe and effective for you

## 2021-01-18 NOTE — PROGRESS NOTES
[employer] [facility]        NAME: Larry Bryan is a 50 y o  female  : 1975    MRN: [id number]  DATE: 2021  TIME: 10:21 AM    Assessment and Plan   Accidental drug ingestion, initial encounter [[id number]]  1  Accidental drug ingestion, initial encounter           Patient Instructions   Increase fluid intake  Monitor for safety today  Assist with walking and daily activities  Follow up with the primary care provider as needed    Follow up with PCP in 3-5 days  Proceed to  ER if symptoms worsen  Chief Complaint     Chief Complaint   Patient presents with    Medication Problem     Given wrong meds at facility where she lives  Given Pantoprazole 40mg and hydroxyzine 25mg this am         History of Present Illness       Patient is a 50year old non verbal female accompanied by staff from group home  This morning at 0805 she was given protonix 40mg and atarax 25mg that were not prescribed to her  The staff member denies changes in mentation or activity  Review of Systems   Review of Systems   Unable to perform ROS: Patient nonverbal   Constitutional: Negative for activity change and appetite change  Gastrointestinal: Negative for vomiting           Current Medications       Current Outpatient Medications:     acetaminophen (TYLENOL) 500 mg tablet, Take 500 mg by mouth every 4 (four) hours as needed for mild pain, Disp: , Rfl:     ammonium lactate (LAC-HYDRIN) 12 % cream, Apply 1 application topically 2 (two) times a day, Disp: , Rfl:     B Complex Vitamins (VITAMIN B COMPLEX PO), Take 1 tablet by mouth 2 (two) times a day, Disp: , Rfl:     benztropine (COGENTIN) 1 mg tablet, Take 1 mg by mouth 2 (two) times a day, Disp: , Rfl:     betamethasone dipropionate (DIPROSONE) 0 05 % cream, Apply 1 application topically 2 (two) times a day, Disp: , Rfl:     busPIRone (BUSPAR) 15 mg tablet, Take 30 mg by mouth 2 (two) times a day , Disp: , Rfl:     Calcium Carb-Cholecalciferol (OYSCO 500 + D) 500 mg-200 units per tablet, Oysco 500/D 500 mg (1,250 mg)-200 unit tablet, Disp: , Rfl:     Calcium Citrate-Vitamin D (CALCIUM + D PO), Take 1 tablet by mouth 2 (two) times a day, Disp: , Rfl:     carBAMazepine (TEGretol XR) 200 mg 12 hr tablet, Take 2 tablets (400 mg total) by mouth 2 (two) times a day, Disp: 360 tablet, Rfl: 3    chlorhexidine (PERIDEX) 0 12 % solution, Apply 15 mL to the mouth or throat 2 (two) times a day, Disp: , Rfl:     chlorproMAZINE (THORAZINE) 50 mg tablet, Take 50 mg by mouth 3 (three) times a day  , Disp: , Rfl:     clonazePAM (KlonoPIN) 0 5 mg tablet, Take 0 5 mg by mouth 3 (three) times a day , Disp: , Rfl:     fexofenadine (ALLEGRA) 180 MG tablet, Take 180 mg by mouth daily, Disp: , Rfl:     fluticasone (FLONASE) 50 mcg/act nasal spray, 1 spray into each nostril daily, Disp: , Rfl:     lithium carbonate 300 mg capsule, Take 300 mg by mouth 2 (two) times a day with meals, Disp: , Rfl:     medroxyPROGESTERone (DEPO-PROVERA) 150 mg/mL injection, Inject 1 mL (150 mg total) into a muscle every 3 (three) months, Disp: 1 mL, Rfl: 4    metoprolol tartrate (LOPRESSOR) 50 mg tablet, Take 50 mg by mouth 2 (two) times a day, Disp: , Rfl:     montelukast (SINGULAIR) 10 mg tablet, Take 10 mg by mouth daily at bedtime, Disp: , Rfl:     Multiple Vitamins-Minerals (CERTAVITE/ANTIOXIDANTS PO), Take 1 tablet by mouth daily, Disp: , Rfl:     neomycin-bacitracin-polymyxin (NEOSPORIN) 5-400-5,000 ointment, Apply topically 3 (three) times a day, Disp: 28 3 g, Rfl: 0    neomycin-bacitracin-polymyxin b (NEOSPORIN) ointment, Apply 1 application topically daily, Disp: , Rfl:     olopatadine HCl (PATADAY) 0 2 % opth drops, Administer 1 drop to both eyes daily, Disp: , Rfl:     Psyllium (METAMUCIL) WAFR, Take 1 Wafer by mouth daily, Disp: , Rfl:     risperiDONE (RisperDAL M-TABS) 2 mg dispersible tablet, Take 3 mg by mouth 3 (three) times a day, Disp: , Rfl:     sodium bicarbonate 650 mg tablet, Take 1 tablet (650 mg total) by mouth daily, Disp: 90 tablet, Rfl: 3    topiramate (TOPAMAX) 25 mg tablet, One tablet twice per day for two weeks  Then half a tablet twice per day for two weeks  Then stop, Disp: 40 tablet, Rfl: 0    LORazepam (ATIVAN) 1 mg tablet, Take 1 tablet by mouth daily at bedtime as needed (increased activity limiting sleep) (Patient not taking: Reported on 1/18/2021), Disp: 10 tablet, Rfl: 0    Current Facility-Administered Medications:     medroxyPROGESTERone (DEPO-PROVERA) IM injection 150 mg, 150 mg, Intramuscular, Once, Nimco Wolf MD    Current Allergies     Allergies as of 01/18/2021 - Reviewed 01/18/2021   Allergen Reaction Noted    Inderal [propranolol] Shortness Of Breath 01/20/2016    Catapres [clonidine hcl]  01/20/2016    Other  12/13/2016            The following portions of the patient's history were reviewed and updated as appropriate: allergies, current medications, past family history, past medical history, past social history, past surgical history and problem list      Past Medical History:   Diagnosis Date    Brain injury ([address] )     Encephalopathy     Hypertension     Seizures ([address] )        No past surgical history on file  Family History   Problem Relation Age of Onset    No Known Problems Mother     No Known Problems Father          Medications have been verified  Objective   /88 (Patient Position: Sitting)   Pulse 86   Temp (!) 97 °F (36 1 °C) (Temporal)   Ht 5' 4" (1 626 m)   Wt 69 9 kg (154 lb)   BMI 26 43 kg/m²        Physical Exam     Physical Exam  Vitals signs reviewed  Constitutional:       General: She is awake  She is not in acute distress  Appearance: She is not ill-appearing  Cardiovascular:      Rate and Rhythm: Normal rate and regular rhythm  Heart sounds: Normal heart sounds, S1 normal and S2 normal    Pulmonary:      Effort: Pulmonary effort is normal       Breath sounds: Normal breath sounds   No decreased breath sounds, wheezing, rhonchi or rales  Skin:     General: Skin is warm and moist    Neurological:      General: No focal deficit present  Mental Status: She is alert and oriented to person, place, and time  Psychiatric:         Behavior: Behavior is cooperative

## 2021-01-19 ENCOUNTER — CLINICAL SUPPORT (OUTPATIENT)
Dept: OBGYN CLINIC | Facility: CLINIC | Age: 46
End: 2021-01-19
Payer: MEDICARE

## 2021-01-19 DIAGNOSIS — Z30.42 ENCOUNTER FOR SURVEILLANCE OF INJECTABLE CONTRACEPTIVE: ICD-10-CM

## 2021-01-19 PROCEDURE — 96372 THER/PROPH/DIAG INJ SC/IM: CPT | Performed by: OBSTETRICS & GYNECOLOGY

## 2021-01-19 RX ADMIN — MEDROXYPROGESTERONE ACETATE 150 MG: 150 INJECTION, SUSPENSION INTRAMUSCULAR at 16:15

## 2021-01-22 ENCOUNTER — TELEPHONE (OUTPATIENT)
Dept: NEUROLOGY | Facility: CLINIC | Age: 46
End: 2021-01-22

## 2021-01-22 NOTE — TELEPHONE ENCOUNTER
Received fax from [facility] regarding safety and health consideration for patient's concomitant use of carbamazepine and lithium  Per fax, therapy can cause lethargy, muscle weakness, ataxia, tremor and hyperreflexia  Also reports of CNS toxicities within 3-14 days of starting concomitant therapy  Dr Sofia Santos, fax in media dated 1/22/21, please review   TY

## 2021-02-11 DIAGNOSIS — Z20.828 EXPOSURE TO SARS-ASSOCIATED CORONAVIRUS: ICD-10-CM

## 2021-02-11 PROCEDURE — U0005 INFEC AGEN DETEC AMPLI PROBE: HCPCS | Performed by: FAMILY MEDICINE

## 2021-02-11 PROCEDURE — U0003 INFECTIOUS AGENT DETECTION BY NUCLEIC ACID (DNA OR RNA); SEVERE ACUTE RESPIRATORY SYNDROME CORONAVIRUS 2 (SARS-COV-2) (CORONAVIRUS DISEASE [COVID-19]), AMPLIFIED PROBE TECHNIQUE, MAKING USE OF HIGH THROUGHPUT TECHNOLOGIES AS DESCRIBED BY CMS-2020-01-R: HCPCS | Performed by: FAMILY MEDICINE

## 2021-02-12 LAB — SARS-COV-2 RNA RESP QL NAA+PROBE: POSITIVE

## 2021-03-03 LAB
BASOPHILS # BLD AUTO: 72 CELLS/UL (ref 0–200)
BASOPHILS NFR BLD AUTO: 1.3 %
BUN SERPL-MCNC: 14 MG/DL (ref 7–25)
BUN/CREAT SERPL: 11 (CALC) (ref 6–22)
CALCIUM SERPL-MCNC: 9.6 MG/DL (ref 8.6–10.2)
CHLORIDE SERPL-SCNC: 107 MMOL/L (ref 98–110)
CO2 SERPL-SCNC: 26 MMOL/L (ref 20–32)
CREAT SERPL-MCNC: 1.23 MG/DL (ref 0.5–1.1)
CREAT UR-MCNC: 47 MG/DL (ref 20–275)
EOSINOPHIL # BLD AUTO: 220 CELLS/UL (ref 15–500)
EOSINOPHIL NFR BLD AUTO: 4 %
ERYTHROCYTE [DISTWIDTH] IN BLOOD BY AUTOMATED COUNT: 11.9 % (ref 11–15)
GLUCOSE SERPL-MCNC: 93 MG/DL (ref 65–139)
HCT VFR BLD AUTO: 37.8 % (ref 35–45)
HGB BLD-MCNC: 12.8 G/DL (ref 11.7–15.5)
LYMPHOCYTES # BLD AUTO: 1513 CELLS/UL (ref 850–3900)
LYMPHOCYTES NFR BLD AUTO: 27.5 %
MCH RBC QN AUTO: 31.4 PG (ref 27–33)
MCHC RBC AUTO-ENTMCNC: 33.9 G/DL (ref 32–36)
MCV RBC AUTO: 92.6 FL (ref 80–100)
MONOCYTES # BLD AUTO: 616 CELLS/UL (ref 200–950)
MONOCYTES NFR BLD AUTO: 11.2 %
NEUTROPHILS # BLD AUTO: 3080 CELLS/UL (ref 1500–7800)
NEUTROPHILS NFR BLD AUTO: 56 %
PLATELET # BLD AUTO: 357 THOUSAND/UL (ref 140–400)
PMV BLD REES-ECKER: 9.8 FL (ref 7.5–12.5)
POTASSIUM SERPL-SCNC: 4.1 MMOL/L (ref 3.5–5.3)
PROT UR-MCNC: 7 MG/DL (ref 5–24)
PROT/CREAT UR: 0.15 MG/MG CREAT (ref 0.02–0.16)
PROT/CREAT UR: 149 MG/G CREAT (ref 21–161)
RBC # BLD AUTO: 4.08 MILLION/UL (ref 3.8–5.1)
SL AMB EGFR AFRICAN AMERICAN: 60 ML/MIN/1.73M2
SL AMB EGFR NON AFRICAN AMERICAN: 52 ML/MIN/1.73M2
SODIUM SERPL-SCNC: 140 MMOL/L (ref 135–146)
WBC # BLD AUTO: 5.5 THOUSAND/UL (ref 3.8–10.8)

## 2021-03-10 ENCOUNTER — TELEPHONE (OUTPATIENT)
Dept: NEUROLOGY | Facility: CLINIC | Age: 46
End: 2021-03-10

## 2021-03-10 LAB
BUN SERPL-MCNC: 17 MG/DL (ref 7–25)
BUN/CREAT SERPL: 14 (CALC) (ref 6–22)
CALCIUM SERPL-MCNC: 9.5 MG/DL (ref 8.6–10.2)
CARBAMAZEPINE SERPL-MCNC: 7.1 MG/L (ref 4–12)
CHLORIDE SERPL-SCNC: 107 MMOL/L (ref 98–110)
CO2 SERPL-SCNC: 22 MMOL/L (ref 20–32)
CREAT SERPL-MCNC: 1.18 MG/DL (ref 0.5–1.1)
GLUCOSE SERPL-MCNC: 88 MG/DL (ref 65–99)
POTASSIUM SERPL-SCNC: 4.4 MMOL/L (ref 3.5–5.3)
SL AMB EGFR AFRICAN AMERICAN: 64 ML/MIN/1.73M2
SL AMB EGFR NON AFRICAN AMERICAN: 55 ML/MIN/1.73M2
SODIUM SERPL-SCNC: 139 MMOL/L (ref 135–146)

## 2021-03-10 NOTE — TELEPHONE ENCOUNTER
----- Message from [facility] sent at 3/10/2021  8:13 AM EST -----  Carbamazepine level is good  Kidney function is stable

## 2021-03-31 ENCOUNTER — TRANSCRIBE ORDERS (OUTPATIENT)
Dept: ADMINISTRATIVE | Facility: HOSPITAL | Age: 46
End: 2021-03-31

## 2021-03-31 DIAGNOSIS — Z12.31 ENCOUNTER FOR SCREENING MAMMOGRAM FOR MALIGNANT NEOPLASM OF BREAST: Primary | ICD-10-CM

## 2021-04-06 ENCOUNTER — HOSPITAL ENCOUNTER (OUTPATIENT)
Dept: MAMMOGRAPHY | Facility: HOSPITAL | Age: 46
Discharge: HOME | End: 2021-04-06
Payer: MEDICARE

## 2021-04-06 ENCOUNTER — OFFICE VISIT (OUTPATIENT)
Dept: OBGYN CLINIC | Facility: CLINIC | Age: 46
End: 2021-04-06
Payer: MEDICARE

## 2021-04-06 VITALS — BODY MASS INDEX: 25.95 KG/M2 | HEIGHT: 64 IN | WEIGHT: 152 LBS

## 2021-04-06 VITALS
HEIGHT: 64 IN | TEMPERATURE: 97.8 F | WEIGHT: 152 LBS | BODY MASS INDEX: 25.95 KG/M2 | DIASTOLIC BLOOD PRESSURE: 84 MMHG | SYSTOLIC BLOOD PRESSURE: 136 MMHG

## 2021-04-06 DIAGNOSIS — Z12.31 ENCOUNTER FOR SCREENING MAMMOGRAM FOR MALIGNANT NEOPLASM OF BREAST: ICD-10-CM

## 2021-04-06 DIAGNOSIS — Z30.42 ENCOUNTER FOR SURVEILLANCE OF INJECTABLE CONTRACEPTIVE: Primary | ICD-10-CM

## 2021-04-06 PROCEDURE — 77067 SCR MAMMO BI INCL CAD: CPT

## 2021-04-06 PROCEDURE — 96372 THER/PROPH/DIAG INJ SC/IM: CPT | Performed by: OBSTETRICS & GYNECOLOGY

## 2021-04-06 RX ADMIN — MEDROXYPROGESTERONE ACETATE 150 MG: 150 INJECTION, SUSPENSION INTRAMUSCULAR at 14:02

## 2021-04-07 RX ORDER — MEDROXYPROGESTERONE ACETATE 150 MG/ML
150 INJECTION, SUSPENSION INTRAMUSCULAR ONCE
Status: COMPLETED | OUTPATIENT
Start: 2021-04-07 | End: 2021-04-06

## 2021-04-13 ENCOUNTER — TELEMEDICINE (OUTPATIENT)
Dept: NEPHROLOGY | Facility: CLINIC | Age: 46
End: 2021-04-13
Payer: MEDICARE

## 2021-04-13 VITALS
BODY MASS INDEX: 25.56 KG/M2 | HEART RATE: 72 BPM | DIASTOLIC BLOOD PRESSURE: 74 MMHG | SYSTOLIC BLOOD PRESSURE: 106 MMHG | WEIGHT: 148.9 LBS

## 2021-04-13 DIAGNOSIS — N18.31 STAGE 3A CHRONIC KIDNEY DISEASE (HCC): ICD-10-CM

## 2021-04-13 DIAGNOSIS — I12.9 BENIGN HYPERTENSION WITH CKD (CHRONIC KIDNEY DISEASE) STAGE III (HCC): Primary | ICD-10-CM

## 2021-04-13 DIAGNOSIS — N18.30 BENIGN HYPERTENSION WITH CKD (CHRONIC KIDNEY DISEASE) STAGE III (HCC): Primary | ICD-10-CM

## 2021-04-13 DIAGNOSIS — E87.8 LOW BICARBONATE LEVEL: ICD-10-CM

## 2021-04-13 PROCEDURE — 99214 OFFICE O/P EST MOD 30 MIN: CPT | Performed by: INTERNAL MEDICINE

## 2021-04-13 RX ORDER — SODIUM BICARBONATE 650 MG/1
650 TABLET ORAL 2 TIMES DAILY
Qty: 90 TABLET | Refills: 3 | Status: SHIPPED | OUTPATIENT
Start: 2021-04-13 | End: 2021-10-26 | Stop reason: SDUPTHER

## 2021-04-13 NOTE — PROGRESS NOTES
Virtual Regular Visit    Assessment/Plan:  Chronic kidney disease stage III (baseline serum creatinine 1 3, previous baseline used to be 1 0 )  - Last serum creatinine 1 1 in March 2021 overall stable     - She could have underlying CKD likely secondary to long-term hypertension, chronic long-term lithium related nephrotoxicity (on lithium >12 years which can cause chronic interstitial nephritis )  - We'll check BMP before next visit  -urinalysis in May 2020 bland without hematuria or proteinuria    -patient is unable to be weaned of lithium due to her multiple significant psych issues  (psychiatrist Dr Hiral Chaidez Ph - [phone])  -renal ultrasound in February 2019 shows right kidney 10 2 cm, left kidney 9 9 cm, echogenic appearance of right kidney, no hydronephrosis, echogenic appearance in the left kidney     Serum sodium level overall stable at 139  -she is currently not on any fluid restriction  -prior 24 hour UO 2 8 L  Difficult to obtain another 24 hr UO and remains very challenging due to psych issues and unable to comply with proper collection to determine if she truly has polyuria    -last urine osmolality 239 in February 2019   -most recent urinalysis in May 2020 shows diluted urine  -patient is overall stable on multiple psych medications including lithium   If has worsening hypernatremia or polyuria issues, may need to consider Amiloride      Concern for medullary nephrocalcinosis on renal ultrasound  -no obvious recent episodes of kidney stone flare-up      Hypertension  -due to patient's developmental disorder, patient does not sit still to be able to check blood pressure accurately  she remains very agitated at times  -BP readings at group home  120s to 140s/80s to 90s   -Currently remains on metoprolol      Mild proteinuria, last UPC ratio nonsignificant 149 mg in March 2021   Continue to closely monitor   If worsening, may need to consider ACE-inhibitor       Low bicarbonate, stable with last bicarb level 22  Continue sodium bicarb one tablet b i d  - ?metabolic acidosis suspected to be due to Topamax related proximal RTA  Now she remains off Topamax since January 2021 as per group home nurse    -continue to monitor bicarb level, hopefully will be able to discontinue in the future     Problem List Items Addressed This Visit        Cardiovascular and Mediastinum    Benign hypertension with CKD (chronic kidney disease) stage III - Primary    Relevant Orders    Basic metabolic panel    CBC    Microalbumin / creatinine urine ratio    Phosphorus    PTH, intact    Magnesium       Genitourinary    Stage 3a chronic kidney disease    Relevant Orders    Basic metabolic panel    CBC    Microalbumin / creatinine urine ratio    Phosphorus    PTH, intact    Magnesium       Other    Low bicarbonate level    Relevant Medications    sodium bicarbonate 650 mg tablet        Reason for visit is   Chief Complaint   Patient presents with    Virtual Regular Visit    Chronic Kidney Disease        Encounter provider Swathi Alberts MD    Provider located at [address] [place] [id number]      Recent Visits  No visits were found meeting these conditions  Showing recent visits within past 7 days and meeting all other requirements     Today's Visits  Date Type Provider Dept   04/13/21 Telemedicine Swathi Alberts MD [facility] 22 today's visits and meeting all other requirements     Future Appointments  No visits were found meeting these conditions  Showing future appointments within next 150 days and meeting all other requirements        The patient was identified by name and date of birth  Patient's nurse conducted most of video visit given patient's underlying intellectual disability, developmental disorder issues and unable to maintain communication    Nurse was informed that this is a telemedicine visit and that the visit is being conducted through [employer] and this is a secure, HIPAA-compliant platform  She agrees to proceed     My office door was closed  No one else was in the room  She acknowledged consent and understanding of privacy and security of the video platform  Nurse has agreed to participate and understands they can discontinue the visit at any time  Nurse is aware this is a billable service  Subjective  Patient is 38-year-old female with significant past medical history of developmental disorder, mental retardation, occipital injury in the past, history of epilepsy, hypertension for at least 9 years, for regular follow-up of renal failure and hypernatremia  Previous baseline creatinine 1 0 going back to 2013 although her creatinine has been in the range of 1 2-1 3 since January 2016  Most recent serum creatinine stable at baseline  She lives at group home  Her BP readings as mentioned above  She remains off Topamax since January 2021 as per group home nurse  She is unable to provide any history due to underlying intellectual disability  All history is obtained from reviewing medical records and talking to the nurse      Unable to comment whether patient has any urinary complaint or not  Patient has been on metoprolol for hypertension  She is also on carbamazepine, long-term chronic lithium, buspirone, chlorpromazine, and benztropine     Past Medical History:   Diagnosis Date    [facility] )     Encephalopathy     Hypertension     Seizures ([facility] )        No past surgical history on file      Current Outpatient Medications   Medication Sig Dispense Refill    acetaminophen (TYLENOL) 500 mg tablet Take 500 mg by mouth every 4 (four) hours as needed for mild pain      B Complex Vitamins (VITAMIN B COMPLEX PO) Take 1 tablet by mouth 2 (two) times a day      benztropine (COGENTIN) 1 mg tablet Take 1 mg by mouth 2 (two) times a day      busPIRone (BUSPAR) 15 mg tablet Take 30 mg by mouth 2 (two) times a day  Calcium Carb-Cholecalciferol (OYSCO 500 + D) 500 mg-200 units per tablet Oysco 500/D 500 mg (1,250 mg)-200 unit tablet      carBAMazepine (TEGretol XR) 200 mg 12 hr tablet Take 2 tablets (400 mg total) by mouth 2 (two) times a day 360 tablet 3    chlorhexidine (PERIDEX) 0 12 % solution Apply 15 mL to the mouth or throat 2 (two) times a day      chlorproMAZINE (THORAZINE) 50 mg tablet Take 50 mg by mouth 3 (three) times a day        clonazePAM (KlonoPIN) 0 5 mg tablet Take 0 5 mg by mouth 3 (three) times a day       lithium carbonate 300 mg capsule Take 300 mg by mouth 2 (two) times a day with meals      medroxyPROGESTERone (DEPO-PROVERA) 150 mg/mL injection Inject 1 mL (150 mg total) into a muscle every 3 (three) months 1 mL 4    metoprolol tartrate (LOPRESSOR) 50 mg tablet Take 50 mg by mouth 2 (two) times a day      montelukast (SINGULAIR) 10 mg tablet Take 10 mg by mouth daily at bedtime      olopatadine HCl (PATADAY) 0 2 % opth drops Administer 1 drop to both eyes daily      risperiDONE (RisperDAL M-TABS) 2 mg dispersible tablet Take 3 mg by mouth 3 (three) times a day      sodium bicarbonate 650 mg tablet Take 1 tablet (650 mg total) by mouth 2 (two) times a day 90 tablet 3    ammonium lactate (LAC-HYDRIN) 12 % cream Apply 1 application topically 2 (two) times a day      betamethasone dipropionate (DIPROSONE) 0 05 % cream Apply 1 application topically 2 (two) times a day      fexofenadine (ALLEGRA) 180 MG tablet Take 180 mg by mouth daily      fluticasone (FLONASE) 50 mcg/act nasal spray 1 spray into each nostril daily      LORazepam (ATIVAN) 1 mg tablet Take 1 tablet by mouth daily at bedtime as needed (increased activity limiting sleep) 10 tablet 0    Multiple Vitamins-Minerals (CERTAVITE/ANTIOXIDANTS PO) Take 1 tablet by mouth daily      neomycin-bacitracin-polymyxin (NEOSPORIN) 5-400-5,000 ointment Apply topically 3 (three) times a day 28 3 g 0    neomycin-bacitracin-polymyxin b (NEOSPORIN) ointment Apply 1 application topically daily      Psyllium (METAMUCIL) WAFR Take 1 Wafer by mouth daily      topiramate (TOPAMAX) 25 mg tablet One tablet twice per day for two weeks  Then half a tablet twice per day for two weeks  Then stop 40 tablet 0     No current facility-administered medications for this visit  Allergies   Allergen Reactions    Inderal [Propranolol] Shortness Of Breath    Catapres [Clonidine Hcl]     Other      Nuts         Unable to obtain any review of system from patient directly due to patient's underlying intellectual disability, unable to answer questions  Video Exam    Vitals:    04/13/21 1024   BP: 106/74   Patient Position: Sitting   Pulse: 72   Weight: 67 5 kg (148 lb 14 4 oz)       Physical Exam  Constitutional:       Appearance: She is well-developed  HENT:      Head: Atraumatic  Right Ear: External ear normal       Left Ear: External ear normal       Nose: Nose normal    Cardiovascular:      Comments: No significant edema in legs  Pulmonary:      Effort: No respiratory distress  Abdominal:      General: There is no distension  Musculoskeletal:      Right lower leg: No edema  Left lower leg: No edema  Skin:     Findings: No rash  Neurological:      Mental Status: She is alert  Comments: Active, alert, intellectual disability, agitated at times, does not answer any questions          VIRTUAL VISIT DISCLAIMER    Group home nurse acknowledges that she has consented to an online visit or consultation  She understands that the online visit is based solely on information provided by her, and that, in the absence of a face-to-face physical evaluation by the physician, the diagnosis patient receives is both limited and provisional in terms of accuracy and completeness  This is not intended to replace a full medical face-to-face evaluation by the physician  Group home nurse understands and accepts these terms

## 2021-04-26 ENCOUNTER — OFFICE VISIT (OUTPATIENT)
Dept: NEUROLOGY | Facility: CLINIC | Age: 46
End: 2021-04-26
Payer: MEDICARE

## 2021-04-26 VITALS
DIASTOLIC BLOOD PRESSURE: 70 MMHG | BODY MASS INDEX: 25.27 KG/M2 | HEART RATE: 70 BPM | SYSTOLIC BLOOD PRESSURE: 116 MMHG | WEIGHT: 148 LBS | HEIGHT: 64 IN

## 2021-04-26 DIAGNOSIS — G40.209 PARTIAL SYMPTOMATIC EPILEPSY WITH COMPLEX PARTIAL SEIZURES, NOT INTRACTABLE, WITHOUT STATUS EPILEPTICUS (HCC): ICD-10-CM

## 2021-04-26 PROCEDURE — 99214 OFFICE O/P EST MOD 30 MIN: CPT | Performed by: PSYCHIATRY & NEUROLOGY

## 2021-04-26 RX ORDER — CARBAMAZEPINE 200 MG/1
400 TABLET, EXTENDED RELEASE ORAL 2 TIMES DAILY
Qty: 360 TABLET | Refills: 3 | Status: SHIPPED | OUTPATIENT
Start: 2021-04-26 | End: 2021-09-14 | Stop reason: SDUPTHER

## 2021-04-26 NOTE — PATIENT INSTRUCTIONS
1  Continue carbamazepine unchanged  2  Let us know if there are seizures  3  Return in about 6 months to see Kathleen Mcgarry

## 2021-04-26 NOTE — PROGRESS NOTES
[place] [address] [facility] [address] [facility]  Follow Up Visit    Impression/Plan    Ms Kelton Rosales is a 48 y o  female with epilepsy,  likely focal due to childhood injury, but most recent EEG (slowing) and MRI unrevealing  Seizures currently well controlled, with seizure freedom for greater than 6 years       No transitioned off topiramate to carbamazepine monotherapy  Doing well  Reviewed blood work  No behavior change  Patient Instructions   1  Continue carbamazepine unchanged  2  Let us know if there are seizures  3  Return in about 6 months to see Geovanni Reeves and all orders for this visit:    Partial symptomatic epilepsy with complex partial seizures, not intractable, without status epilepticus (HCC)  -     carBAMazepine (TEGretol XR) 200 mg 12 hr tablet; Take 2 tablets (400 mg total) by mouth 2 (two) times a day        Will Craven is returning to the [place] [address] [facility] for follow up  Her history includes PDD, MR, occipital brain injury and seizures  There was a severe head injury at 18 months  She is nonverbal and a caregiver assists in providing history  She was previously followed by Dr Claudia Ritter  Past notes describe her seizures as violent shaking with foaming at the mouth that can be preceded by a loud scream  Past events tend to occur at night  There has been urinary incontinence         Interval Events:   Seizures since last visit: None    Arrives with caregiver that has known her for more than 10 years  No seizures  No behavioral change  Topiramate stopped after last visit  Labs 3/9/2021   Na 139, Cr 1 18    Current AEDs:  Carbamazepine  mg bid   Medication side effects: None  Medication adherence: Yes    Other medications include:   Clonazepam 0 5 mg tid     Event/Seizure semiology:  Violent shaking with foaming at the mouth that can be preceded by a loud scream     Special Features  Status epilepticus:  Not known  Self Injury Seizures:  None known  Precipitating Factors: Stress     Epilepsy Risk Factors:  Intellectual disability  Head injury (moderate/severe)     Prior AEDs:  Topiramate stopped 2020 in setting of RTA     Prior Evaluation:  MRI in 2008 was read as normal  Dr Prosper Georges last note indicates that her last EEG was unrevealing for source and no focal discharges noted  There was generalized encephalopathy      History Reviewed: The following were reviewed and updated as appropriate: past medical history, past social history and problem list     Psychiatric History:  Followed by psychiatry, multiple psychiatric medications  Behavioral outbursts       Social History:   Driving: No  Lives Alone: No  Occupation: on permanent disability    Objective    /70 (BP Location: Left arm, Patient Position: Sitting, Cuff Size: Standard)   Pulse 70   Ht 5' 4" (1 626 m)   Wt 67 1 kg (148 lb)   BMI 25 40 kg/m²      General Exam  No acute distress  Neurologic Exam  Mental Status:  follows some basic commands  Does not speak  Constantly rocking and moving  Language: no words  Vocalization at times  Cranial Nerves: Face symmetric  Motor:  Moves all 4 extremities well without evidence of asymmetry  Coordination: give 5 accurately with left arm  Gait: steady, somewhat stooped and impulsive  ROS:    Review of Systems   Constitutional: Negative  Negative for appetite change and fever  HENT: Negative  Negative for hearing loss, tinnitus, trouble swallowing and voice change  Eyes: Negative  Negative for photophobia and pain  Respiratory: Negative  Negative for shortness of breath  Cardiovascular: Negative  Negative for palpitations  Gastrointestinal: Negative  Negative for nausea and vomiting  Endocrine: Negative  Negative for cold intolerance  Genitourinary: Negative  Negative for dysuria, frequency and urgency  Musculoskeletal: Negative  Negative for myalgias and neck pain  Skin: Negative  Negative for rash  Neurological: Negative  Negative for dizziness, tremors, seizures, syncope, facial asymmetry, speech difficulty, weakness, light-headedness, numbness and headaches  Hematological: Negative  Does not bruise/bleed easily  Psychiatric/Behavioral: Negative  Negative for confusion, hallucinations and sleep disturbance  ROS reviewed and updated as appropriate

## 2021-07-07 ENCOUNTER — OFFICE VISIT (OUTPATIENT)
Dept: CARDIOLOGY CLINIC | Facility: CLINIC | Age: 46
End: 2021-07-07
Payer: MEDICARE

## 2021-07-07 VITALS
WEIGHT: 145.6 LBS | BODY MASS INDEX: 24.86 KG/M2 | HEIGHT: 64 IN | HEART RATE: 79 BPM | DIASTOLIC BLOOD PRESSURE: 74 MMHG | SYSTOLIC BLOOD PRESSURE: 120 MMHG

## 2021-07-07 DIAGNOSIS — N18.30 BENIGN HYPERTENSION WITH CKD (CHRONIC KIDNEY DISEASE) STAGE III (HCC): Primary | ICD-10-CM

## 2021-07-07 DIAGNOSIS — I12.9 BENIGN HYPERTENSION WITH CKD (CHRONIC KIDNEY DISEASE) STAGE III (HCC): Primary | ICD-10-CM

## 2021-07-07 DIAGNOSIS — R94.31 ABNORMAL EKG: ICD-10-CM

## 2021-07-07 PROCEDURE — 93000 ELECTROCARDIOGRAM COMPLETE: CPT | Performed by: INTERNAL MEDICINE

## 2021-07-07 PROCEDURE — 99203 OFFICE O/P NEW LOW 30 MIN: CPT | Performed by: INTERNAL MEDICINE

## 2021-07-07 NOTE — PROGRESS NOTES
Cardiology Follow Up    Kenney Ray  1975  [id number]  [facility]  [address]  [address]  [phone]    No diagnosis found  Discussion: No further evaluation is needed  Nonspecific ECG abnormalities with no symptoms and no cardiac history  Cardiovascular History: The patient is 48years old  There is a history of pervasive developmental disorder  There is no cardiac history  There have been no symptoms of dyspnea, chest discomfort, or syncope  She was referred because of an abnormal ECG  Current and old ECGs show sinus rhythm with minor nonspecific T-wave abnormalities but no ischemic changes, repolarization abnormalities, or QT prolongation  She does take multiple psychoactive medications that may be associated with ST-T changes  In the absence of symptoms, no further workup is warranted, and she may proceed with any indicated procedures      Patient Active Problem List   Diagnosis    Benign hypertension with CKD (chronic kidney disease) stage III (HCC)    Stage 3a chronic kidney disease (HCC)    Low bicarbonate level    Partial symptomatic epilepsy ([facility] )    PDD (pervasive developmental disorder)     Past Medical History:   Diagnosis Date    Brain injury ([facility] )     Encephalopathy     Hypertension     Seizures ([facility] )      Social History     Socioeconomic History    Marital status: Single     Spouse name: Not on file    Number of children: Not on file    Years of education: Not on file    Highest education level: Not on file   Occupational History    Not on file   Tobacco Use    Smoking status: Never Smoker    Smokeless tobacco: Never Used   Vaping Use    Vaping Use: Never used   Substance and Sexual Activity    Alcohol use: No    Drug use: No    Sexual activity: Never   Other Topics Concern    Not on file   Social History Narrative    Not on file     Social Determinants of Health     Financial Resource Strain:     Difficulty of Paying Living Expenses:    Food Insecurity:     Worried About Running Out of Food in the Last Year:     [address] in the Last Year:    Transportation Needs:     Lack of Transportation (Medical):  Lack of Transportation (Non-Medical):    Physical Activity:     Days of Exercise per Week:     Minutes of Exercise per Session:    Stress:     Feeling of Stress :    Social Connections:     Frequency of Communication with Friends and Family:     Frequency of Social Gatherings with Friends and Family:     Attends Alevism Services:     Active Member of Clubs or Organizations:     Attends Club or Organization Meetings:     Marital Status:    Intimate Partner Violence:     Fear of Current or Ex-Partner:     Emotionally Abused:     Physically Abused:     Sexually Abused:       Family History   Problem Relation Age of Onset    No Known Problems Mother     No Known Problems Father      No past surgical history on file      Current Outpatient Medications:     acetaminophen (TYLENOL) 500 mg tablet, Take 500 mg by mouth every 4 (four) hours as needed for mild pain, Disp: , Rfl:     ammonium lactate (LAC-HYDRIN) 12 % cream, Apply 1 application topically 2 (two) times a day, Disp: , Rfl:     B Complex Vitamins (VITAMIN B COMPLEX PO), Take 1 tablet by mouth 2 (two) times a day, Disp: , Rfl:     benztropine (COGENTIN) 1 mg tablet, Take 1 mg by mouth 2 (two) times a day, Disp: , Rfl:     betamethasone dipropionate (DIPROSONE) 0 05 % cream, Apply 1 application topically 2 (two) times a day, Disp: , Rfl:     busPIRone (BUSPAR) 15 mg tablet, Take 30 mg by mouth 2 (two) times a day , Disp: , Rfl:     Calcium Carb-Cholecalciferol (OYSCO 500 + D) 500 mg-200 units per tablet, Oysco 500/D 500 mg (1,250 mg)-200 unit tablet, Disp: , Rfl:     carBAMazepine (TEGretol XR) 200 mg 12 hr tablet, Take 2 tablets (400 mg total) by mouth 2 (two) times a day, Disp: 360 tablet, Rfl: 3    chlorhexidine (PERIDEX) 0 12 % solution, Apply 15 mL to the mouth or throat 2 (two) times a day, Disp: , Rfl:     chlorproMAZINE (THORAZINE) 50 mg tablet, Take 50 mg by mouth 3 (three) times a day  , Disp: , Rfl:     clonazePAM (KlonoPIN) 0 5 mg tablet, Take 0 5 mg by mouth 3 (three) times a day , Disp: , Rfl:     fexofenadine (ALLEGRA) 180 MG tablet, Take 180 mg by mouth daily, Disp: , Rfl:     fluticasone (FLONASE) 50 mcg/act nasal spray, 1 spray into each nostril daily, Disp: , Rfl:     lithium carbonate 300 mg capsule, Take 300 mg by mouth 2 (two) times a day with meals, Disp: , Rfl:     LORazepam (ATIVAN) 1 mg tablet, Take 1 tablet by mouth daily at bedtime as needed (increased activity limiting sleep), Disp: 10 tablet, Rfl: 0    medroxyPROGESTERone (DEPO-PROVERA) 150 mg/mL injection, Inject 1 mL (150 mg total) into a muscle every 3 (three) months, Disp: 1 mL, Rfl: 4    metoprolol tartrate (LOPRESSOR) 50 mg tablet, Take 50 mg by mouth 2 (two) times a day, Disp: , Rfl:     montelukast (SINGULAIR) 10 mg tablet, Take 10 mg by mouth daily at bedtime, Disp: , Rfl:     Multiple Vitamins-Minerals (CERTAVITE/ANTIOXIDANTS PO), Take 1 tablet by mouth daily, Disp: , Rfl:     neomycin-bacitracin-polymyxin (NEOSPORIN) 5-400-5,000 ointment, Apply topically 3 (three) times a day, Disp: 28 3 g, Rfl: 0    neomycin-bacitracin-polymyxin b (NEOSPORIN) ointment, Apply 1 application topically daily, Disp: , Rfl:     olopatadine HCl (PATADAY) 0 2 % opth drops, Administer 1 drop to both eyes daily, Disp: , Rfl:     Psyllium (METAMUCIL) WAFR, Take 1 Wafer by mouth daily, Disp: , Rfl:     risperiDONE (RisperDAL M-TABS) 2 mg dispersible tablet, Take 1 mg by mouth 3 (three) times a day , Disp: , Rfl:     sodium bicarbonate 650 mg tablet, Take 1 tablet (650 mg total) by mouth 2 (two) times a day, Disp: 90 tablet, Rfl: 3  Allergies   Allergen Reactions    Inderal [Propranolol] Shortness Of Breath    Catapres [Clonidine Hcl]     Other      Nuts         Labs:  Orders Only on 03/09/2021   Component Date Value    Glucose, Random 03/09/2021 88     BUN 03/09/2021 17     Creatinine 03/09/2021 1 18*    eGFR Non  03/09/2021 55*    eGFR  03/09/2021 64     SL AMB BUN/CREATININE RA* 03/09/2021 14     Sodium 03/09/2021 139     Potassium 03/09/2021 4 4     Chloride 03/09/2021 107     CO2 03/09/2021 22     Calcium 03/09/2021 9 5     Carbamazepine, Serum 03/09/2021 7 1    Orders Only on 03/02/2021   Component Date Value    Glucose, Random 03/02/2021 93     BUN 03/02/2021 14     Creatinine 03/02/2021 1 23*    eGFR Non African American 03/02/2021 52*    eGFR  03/02/2021 60     SL AMB BUN/CREATININE RA* 03/02/2021 11     Sodium 03/02/2021 140     Potassium 03/02/2021 4 1     Chloride 03/02/2021 107     CO2 03/02/2021 26     Calcium 03/02/2021 9 6     White Blood Cell Count 03/02/2021 5 5     Red Blood Cell Count 03/02/2021 4 08     Hemoglobin 03/02/2021 12 8     HCT 03/02/2021 37 8     MCV 03/02/2021 92 6     MCH 03/02/2021 31 4     MCHC 03/02/2021 33 9     RDW 03/02/2021 11 9     Platelet Count 28/00/9844 357     SL AMB MPV 03/02/2021 9 8     Neutrophils (Absolute) 03/02/2021 3,080     Lymphocytes (Absolute) 03/02/2021 1,513     Monocytes (Absolute) 03/02/2021 616     Eosinophils (Absolute) 03/02/2021 220     Basophils ABS 03/02/2021 72     Neutrophils 03/02/2021 56     Lymphocytes 03/02/2021 27 5     Monocytes 03/02/2021 11 2     Eosinophils 03/02/2021 4 0     Basophils PCT 03/02/2021 1 3     Creatinine, Urine 03/02/2021 47     Protein/Creat Ratio 03/02/2021 149     Protein/Creat Ratio 03/02/2021 0 149     Total Protein, Urine 03/02/2021 7    Orders Only on 02/11/2021   Component Date Value    SARS-CoV-2 02/11/2021 Positive*     Imaging: No results found  Review of Systems:  Review of Systems   Constitutional: Negative     HENT: Negative  Eyes: Negative  Cardiovascular: Negative  Respiratory: Negative  Endocrine: Negative  Hematologic/Lymphatic: Negative  Skin: Negative  Musculoskeletal: Negative  Gastrointestinal: Negative  Genitourinary: Negative  Neurological: Negative  Psychiatric/Behavioral: Negative  Allergic/Immunologic: Negative  All other systems reviewed and are negative  There were no vitals filed for this visit  Weight (last 2 days)     None          Physical Exam:  Physical Exam  Vitals reviewed  Constitutional:       General: She is not in acute distress  Appearance: She is well-developed  She is not diaphoretic  HENT:      Head: Normocephalic and atraumatic  Eyes:      General: No scleral icterus  Conjunctiva/sclera: Conjunctivae normal    Neck:      Vascular: No JVD  Trachea: No tracheal deviation  Cardiovascular:      Rate and Rhythm: Normal rate and regular rhythm  Pulses: Intact distal pulses  Heart sounds: Normal heart sounds  No murmur heard  No friction rub  No gallop  Pulmonary:      Effort: Pulmonary effort is normal  No respiratory distress  Breath sounds: Normal breath sounds  No stridor  No wheezing or rales  Chest:      Chest wall: No tenderness  Abdominal:      General: Bowel sounds are normal  There is no distension  Palpations: Abdomen is soft  Tenderness: There is no abdominal tenderness  Musculoskeletal:         General: No tenderness  Normal range of motion  Cervical back: Normal range of motion and neck supple  Skin:     General: Skin is warm and dry  Findings: No erythema  Neurological:      Mental Status: She is alert and oriented to person, place, and time  Cranial Nerves: No cranial nerve deficit  Coordination: Coordination normal    Psychiatric:      Comments: Pervasive developmental disorder  Cooperative            Russ Padilla MD

## 2021-09-14 DIAGNOSIS — G40.209 PARTIAL SYMPTOMATIC EPILEPSY WITH COMPLEX PARTIAL SEIZURES, NOT INTRACTABLE, WITHOUT STATUS EPILEPTICUS (HCC): ICD-10-CM

## 2021-09-15 RX ORDER — CARBAMAZEPINE 200 MG/1
400 TABLET, EXTENDED RELEASE ORAL 2 TIMES DAILY
Qty: 360 TABLET | Refills: 3 | Status: SHIPPED | OUTPATIENT
Start: 2021-09-15 | End: 2022-06-29 | Stop reason: SDUPTHER

## 2021-09-23 ENCOUNTER — TELEPHONE (OUTPATIENT)
Dept: CARDIOLOGY CLINIC | Facility: CLINIC | Age: 46
End: 2021-09-23

## 2021-10-08 ENCOUNTER — CLINICAL SUPPORT (OUTPATIENT)
Dept: OBGYN CLINIC | Facility: CLINIC | Age: 46
End: 2021-10-08
Payer: MEDICARE

## 2021-10-08 VITALS
WEIGHT: 136.8 LBS | HEIGHT: 64 IN | DIASTOLIC BLOOD PRESSURE: 72 MMHG | SYSTOLIC BLOOD PRESSURE: 122 MMHG | BODY MASS INDEX: 23.35 KG/M2

## 2021-10-08 DIAGNOSIS — Z30.42 ENCOUNTER FOR SURVEILLANCE OF INJECTABLE CONTRACEPTIVE: Primary | ICD-10-CM

## 2021-10-08 PROCEDURE — 96372 THER/PROPH/DIAG INJ SC/IM: CPT | Performed by: OBSTETRICS & GYNECOLOGY

## 2021-10-08 RX ORDER — MEDROXYPROGESTERONE ACETATE 150 MG/ML
150 INJECTION, SUSPENSION INTRAMUSCULAR ONCE
Status: COMPLETED | OUTPATIENT
Start: 2021-10-08 | End: 2021-10-08

## 2021-10-08 RX ADMIN — MEDROXYPROGESTERONE ACETATE 150 MG: 150 INJECTION, SUSPENSION INTRAMUSCULAR at 11:13

## 2021-10-16 LAB
ALBUMIN/CREAT UR: NORMAL MCG/MG CREAT
BASOPHILS # BLD AUTO: 40 CELLS/UL (ref 0–200)
BASOPHILS NFR BLD AUTO: 1 %
BUN SERPL-MCNC: 19 MG/DL (ref 7–25)
BUN/CREAT SERPL: 15 (CALC) (ref 6–22)
CALCIUM SERPL-MCNC: 9.4 MG/DL (ref 8.6–10.2)
CALCIUM SERPL-MCNC: 9.4 MG/DL (ref 8.6–10.2)
CHLORIDE SERPL-SCNC: 105 MMOL/L (ref 98–110)
CO2 SERPL-SCNC: 26 MMOL/L (ref 20–32)
CREAT SERPL-MCNC: 1.25 MG/DL (ref 0.5–1.1)
CREAT UR-MCNC: 31 MG/DL (ref 20–275)
EOSINOPHIL # BLD AUTO: 180 CELLS/UL (ref 15–500)
EOSINOPHIL NFR BLD AUTO: 4.5 %
ERYTHROCYTE [DISTWIDTH] IN BLOOD BY AUTOMATED COUNT: 11.7 % (ref 11–15)
GLUCOSE SERPL-MCNC: 86 MG/DL (ref 65–99)
HCT VFR BLD AUTO: 39.5 % (ref 35–45)
HGB BLD-MCNC: 12.8 G/DL (ref 11.7–15.5)
LYMPHOCYTES # BLD AUTO: 1448 CELLS/UL (ref 850–3900)
LYMPHOCYTES NFR BLD AUTO: 36.2 %
MAGNESIUM SERPL-MCNC: 2 MG/DL (ref 1.5–2.5)
MCH RBC QN AUTO: 30 PG (ref 27–33)
MCHC RBC AUTO-ENTMCNC: 32.4 G/DL (ref 32–36)
MCV RBC AUTO: 92.5 FL (ref 80–100)
MICROALBUMIN UR-MCNC: <0.2 MG/DL
MONOCYTES # BLD AUTO: 436 CELLS/UL (ref 200–950)
MONOCYTES NFR BLD AUTO: 10.9 %
NEUTROPHILS # BLD AUTO: 1896 CELLS/UL (ref 1500–7800)
NEUTROPHILS NFR BLD AUTO: 47.4 %
PHOSPHATE SERPL-MCNC: 3.8 MG/DL (ref 2.5–4.5)
PLATELET # BLD AUTO: 311 THOUSAND/UL (ref 140–400)
PMV BLD REES-ECKER: 10 FL (ref 7.5–12.5)
POTASSIUM SERPL-SCNC: 4 MMOL/L (ref 3.5–5.3)
PTH-INTACT SERPL-MCNC: 36 PG/ML (ref 14–64)
RBC # BLD AUTO: 4.27 MILLION/UL (ref 3.8–5.1)
SL AMB EGFR AFRICAN AMERICAN: 59 ML/MIN/1.73M2
SL AMB EGFR NON AFRICAN AMERICAN: 51 ML/MIN/1.73M2
SODIUM SERPL-SCNC: 136 MMOL/L (ref 135–146)
WBC # BLD AUTO: 4 THOUSAND/UL (ref 3.8–10.8)

## 2021-10-21 ENCOUNTER — OFFICE VISIT (OUTPATIENT)
Dept: NEPHROLOGY | Facility: CLINIC | Age: 46
End: 2021-10-21
Payer: MEDICARE

## 2021-10-21 VITALS
SYSTOLIC BLOOD PRESSURE: 125 MMHG | BODY MASS INDEX: 23.39 KG/M2 | WEIGHT: 137 LBS | DIASTOLIC BLOOD PRESSURE: 70 MMHG | HEIGHT: 64 IN

## 2021-10-21 DIAGNOSIS — I12.9 BENIGN HYPERTENSION WITH CKD (CHRONIC KIDNEY DISEASE) STAGE III (HCC): Primary | ICD-10-CM

## 2021-10-21 DIAGNOSIS — N18.31 STAGE 3A CHRONIC KIDNEY DISEASE (HCC): ICD-10-CM

## 2021-10-21 DIAGNOSIS — N18.30 BENIGN HYPERTENSION WITH CKD (CHRONIC KIDNEY DISEASE) STAGE III (HCC): Primary | ICD-10-CM

## 2021-10-21 DIAGNOSIS — E87.8 LOW BICARBONATE LEVEL: ICD-10-CM

## 2021-10-21 PROCEDURE — 99214 OFFICE O/P EST MOD 30 MIN: CPT | Performed by: INTERNAL MEDICINE

## 2021-10-26 ENCOUNTER — OFFICE VISIT (OUTPATIENT)
Dept: NEUROLOGY | Facility: CLINIC | Age: 46
End: 2021-10-26
Payer: MEDICARE

## 2021-10-26 ENCOUNTER — TELEPHONE (OUTPATIENT)
Dept: NEPHROLOGY | Facility: CLINIC | Age: 46
End: 2021-10-26

## 2021-10-26 VITALS
WEIGHT: 139 LBS | HEIGHT: 64 IN | BODY MASS INDEX: 23.73 KG/M2 | TEMPERATURE: 97.3 F | HEART RATE: 80 BPM | SYSTOLIC BLOOD PRESSURE: 124 MMHG | DIASTOLIC BLOOD PRESSURE: 76 MMHG

## 2021-10-26 DIAGNOSIS — E87.8 LOW BICARBONATE LEVEL: ICD-10-CM

## 2021-10-26 DIAGNOSIS — G40.209 PARTIAL SYMPTOMATIC EPILEPSY WITH COMPLEX PARTIAL SEIZURES, NOT INTRACTABLE, WITHOUT STATUS EPILEPTICUS (HCC): Primary | ICD-10-CM

## 2021-10-26 PROCEDURE — 99214 OFFICE O/P EST MOD 30 MIN: CPT | Performed by: NURSE PRACTITIONER

## 2021-10-26 RX ORDER — SODIUM BICARBONATE 650 MG/1
650 TABLET ORAL DAILY
Qty: 90 TABLET | Refills: 3 | Status: SHIPPED | OUTPATIENT
Start: 2021-10-26 | End: 2021-10-28 | Stop reason: SDUPTHER

## 2021-10-29 ENCOUNTER — TELEPHONE (OUTPATIENT)
Dept: NEPHROLOGY | Facility: CLINIC | Age: 46
End: 2021-10-29

## 2021-10-29 DIAGNOSIS — E87.8 LOW BICARBONATE LEVEL: ICD-10-CM

## 2021-10-29 RX ORDER — SODIUM BICARBONATE 650 MG/1
650 TABLET ORAL DAILY
Qty: 90 TABLET | Refills: 3 | Status: SHIPPED | OUTPATIENT
Start: 2021-10-29 | End: 2021-11-01 | Stop reason: SDUPTHER

## 2021-11-01 RX ORDER — SODIUM BICARBONATE 650 MG/1
650 TABLET ORAL DAILY
Qty: 90 TABLET | Refills: 3 | Status: SHIPPED | OUTPATIENT
Start: 2021-11-01 | End: 2021-11-01 | Stop reason: SDUPTHER

## 2021-11-01 RX ORDER — SODIUM BICARBONATE 650 MG/1
650 TABLET ORAL DAILY
Qty: 90 TABLET | Refills: 3 | Status: SHIPPED | OUTPATIENT
Start: 2021-11-01

## 2022-01-06 ENCOUNTER — CLINICAL SUPPORT (OUTPATIENT)
Dept: OBGYN CLINIC | Facility: CLINIC | Age: 47
End: 2022-01-06
Payer: MEDICARE

## 2022-01-06 VITALS — BODY MASS INDEX: 23.39 KG/M2 | WEIGHT: 137 LBS | HEIGHT: 64 IN

## 2022-01-06 DIAGNOSIS — Z30.42 ENCOUNTER FOR SURVEILLANCE OF INJECTABLE CONTRACEPTIVE: Primary | ICD-10-CM

## 2022-01-06 PROCEDURE — 96372 THER/PROPH/DIAG INJ SC/IM: CPT | Performed by: OBSTETRICS & GYNECOLOGY

## 2022-01-06 RX ADMIN — MEDROXYPROGESTERONE ACETATE 150 MG: 150 INJECTION, SUSPENSION INTRAMUSCULAR at 11:24

## 2022-01-06 NOTE — PROGRESS NOTES
Patient was here for a depo injection  Given in left deltoid  Patient tolerated injection well  Appointment needed in about 3 months for another injection

## 2022-01-10 RX ORDER — MEDROXYPROGESTERONE ACETATE 150 MG/ML
150 INJECTION, SUSPENSION INTRAMUSCULAR ONCE
Status: COMPLETED | OUTPATIENT
Start: 2022-01-10 | End: 2022-01-06

## 2022-03-18 DIAGNOSIS — Z01.419 ROUTINE GYNECOLOGICAL EXAMINATION: ICD-10-CM

## 2022-03-18 DIAGNOSIS — Z30.42 ENCOUNTER FOR SURVEILLANCE OF INJECTABLE CONTRACEPTIVE: Primary | ICD-10-CM

## 2022-03-22 RX ORDER — MEDROXYPROGESTERONE ACETATE 150 MG/ML
150 INJECTION, SUSPENSION INTRAMUSCULAR
Qty: 1 ML | Refills: 1 | Status: SHIPPED | OUTPATIENT
Start: 2022-03-22 | End: 2022-07-26 | Stop reason: SDUPTHER

## 2022-03-22 NOTE — TELEPHONE ENCOUNTER
Yearly appointment made for July, and she does need a refill, spoke with , Nurse where she is living

## 2022-03-23 NOTE — TELEPHONE ENCOUNTER
Spoke with nurse at [facility], and made aware patient medication was refilled

## 2022-03-24 ENCOUNTER — CLINICAL SUPPORT (OUTPATIENT)
Dept: OBGYN CLINIC | Facility: CLINIC | Age: 47
End: 2022-03-24
Payer: MEDICARE

## 2022-03-24 VITALS
DIASTOLIC BLOOD PRESSURE: 82 MMHG | WEIGHT: 138 LBS | BODY MASS INDEX: 23.56 KG/M2 | HEIGHT: 64 IN | SYSTOLIC BLOOD PRESSURE: 120 MMHG

## 2022-03-24 DIAGNOSIS — Z30.42 ENCOUNTER FOR SURVEILLANCE OF INJECTABLE CONTRACEPTIVE: Primary | ICD-10-CM

## 2022-03-24 PROCEDURE — 96372 THER/PROPH/DIAG INJ SC/IM: CPT | Performed by: OBSTETRICS & GYNECOLOGY

## 2022-03-24 RX ORDER — MEDROXYPROGESTERONE ACETATE 150 MG/ML
150 INJECTION, SUSPENSION INTRAMUSCULAR ONCE
Status: COMPLETED | OUTPATIENT
Start: 2022-03-24 | End: 2022-03-24

## 2022-03-24 RX ADMIN — MEDROXYPROGESTERONE ACETATE 150 MG: 150 INJECTION, SUSPENSION INTRAMUSCULAR at 11:59

## 2022-05-23 ENCOUNTER — TELEPHONE (OUTPATIENT)
Dept: NEPHROLOGY | Facility: CLINIC | Age: 47
End: 2022-05-23

## 2022-05-23 NOTE — TELEPHONE ENCOUNTER
Patient's caregiver called to schedule a follow up appointment for patient  Patient scheduled for 8/10 with Dr Bhargavi Barajas in [place]  Patient's caregiver requests attempts to reach her be through her mobile work number, [phone]

## 2022-06-08 LAB
ALBUMIN/CREAT UR: NORMAL MCG/MG CREAT
BUN SERPL-MCNC: 16 MG/DL (ref 7–25)
BUN/CREAT SERPL: 13 (CALC) (ref 6–22)
CALCIUM SERPL-MCNC: 10.1 MG/DL (ref 8.6–10.2)
CALCIUM SERPL-MCNC: 10.1 MG/DL (ref 8.6–10.2)
CARBAMAZEPINE SERPL-MCNC: 7 MG/L (ref 4–12)
CHLORIDE SERPL-SCNC: 104 MMOL/L (ref 98–110)
CO2 SERPL-SCNC: 28 MMOL/L (ref 20–32)
CREAT SERPL-MCNC: 1.24 MG/DL (ref 0.5–1.1)
CREAT UR-MCNC: 31 MG/DL (ref 20–275)
GLUCOSE SERPL-MCNC: 88 MG/DL (ref 65–99)
MAGNESIUM SERPL-MCNC: 2 MG/DL (ref 1.5–2.5)
MICROALBUMIN UR-MCNC: <0.2 MG/DL
PHOSPHATE SERPL-MCNC: 4.2 MG/DL (ref 2.5–4.5)
POTASSIUM SERPL-SCNC: 3.7 MMOL/L (ref 3.5–5.3)
PTH-INTACT SERPL-MCNC: 17 PG/ML (ref 16–77)
SL AMB EGFR AFRICAN AMERICAN: 59 ML/MIN/1.73M2
SL AMB EGFR NON AFRICAN AMERICAN: 51 ML/MIN/1.73M2
SODIUM SERPL-SCNC: 138 MMOL/L (ref 135–146)

## 2022-06-09 ENCOUNTER — CLINICAL SUPPORT (OUTPATIENT)
Dept: OBGYN CLINIC | Facility: CLINIC | Age: 47
End: 2022-06-09
Payer: MEDICARE

## 2022-06-09 VITALS
WEIGHT: 141.2 LBS | HEIGHT: 64 IN | DIASTOLIC BLOOD PRESSURE: 78 MMHG | BODY MASS INDEX: 24.11 KG/M2 | SYSTOLIC BLOOD PRESSURE: 120 MMHG

## 2022-06-09 DIAGNOSIS — Z30.42 ENCOUNTER FOR SURVEILLANCE OF INJECTABLE CONTRACEPTIVE: Primary | ICD-10-CM

## 2022-06-09 PROCEDURE — 96372 THER/PROPH/DIAG INJ SC/IM: CPT

## 2022-06-09 RX ORDER — MEDROXYPROGESTERONE ACETATE 150 MG/ML
150 INJECTION, SUSPENSION INTRAMUSCULAR ONCE
Status: COMPLETED | OUTPATIENT
Start: 2022-06-09 | End: 2022-06-09

## 2022-06-09 RX ADMIN — MEDROXYPROGESTERONE ACETATE 150 MG: 150 INJECTION, SUSPENSION INTRAMUSCULAR at 12:04

## 2022-06-12 ENCOUNTER — TELEPHONE (OUTPATIENT)
Dept: NEPHROLOGY | Facility: CLINIC | Age: 47
End: 2022-06-12

## 2022-06-13 NOTE — TELEPHONE ENCOUNTER
Spoke with patient's caregiver Camden Duran and advised: Creatinine stable  No questions at this time

## 2022-06-29 ENCOUNTER — OFFICE VISIT (OUTPATIENT)
Dept: NEUROLOGY | Facility: CLINIC | Age: 47
End: 2022-06-29
Payer: MEDICARE

## 2022-06-29 VITALS
DIASTOLIC BLOOD PRESSURE: 78 MMHG | HEIGHT: 64 IN | TEMPERATURE: 96.8 F | SYSTOLIC BLOOD PRESSURE: 130 MMHG | HEART RATE: 70 BPM | BODY MASS INDEX: 24.24 KG/M2 | OXYGEN SATURATION: 100 % | WEIGHT: 142 LBS

## 2022-06-29 DIAGNOSIS — G40.209 PARTIAL SYMPTOMATIC EPILEPSY WITH COMPLEX PARTIAL SEIZURES, NOT INTRACTABLE, WITHOUT STATUS EPILEPTICUS (HCC): ICD-10-CM

## 2022-06-29 PROCEDURE — 99213 OFFICE O/P EST LOW 20 MIN: CPT | Performed by: PSYCHIATRY & NEUROLOGY

## 2022-06-29 RX ORDER — CARBAMAZEPINE 200 MG/1
400 TABLET, EXTENDED RELEASE ORAL 2 TIMES DAILY
Qty: 360 TABLET | Refills: 3 | Status: SHIPPED | OUTPATIENT
Start: 2022-06-29

## 2022-06-29 NOTE — PROGRESS NOTES
[place] [address] [facility] [address] [facility]  Follow Up Visit    Impression/Plan    Ms Jose Wolfe is a 50 y o  female with epilepsy, likely focal due to childhood injury, but most recent EEG (slowing) and MRI unrevealing  Her history includes PDD and MR  Seizures currently well controlled, with seizure freedom for greater than 6 years        Doing well on carbamazepine monotherapy  Reviewed blood work  No behavior change  Patient Instructions   1  Continue current seizure medications unchanged  2  Let us know if there are seizures or problems with your medication  3  Return in 6 months to see [employer], CRNP  Diagnoses and all orders for this visit:    Partial symptomatic epilepsy with complex partial seizures, not intractable, without status epilepticus (HCC)  -     carBAMazepine (TEGretol XR) 200 mg 12 hr tablet; Take 2 tablets (400 mg total) by mouth 2 (two) times a day        Will Erwin is returning to the [place] [address] [facility] for follow up  Interval Events:   Seizures since last visit: None    Last visit 10/21/2021  Blood work done 6/7/2022 at 07:38: Sodium 138, carbamazepine 7, creatinine 1 24    Current AEDs:  Carbamazepine  mg bid  Medication side effects: None  Medication adherence: Yes    Event/Seizure semiology:  Violent shaking with foaming at the mouth that can be preceded by a loud scream     Special Features  Status epilepticus:  Not known  Self Injury Seizures:  None known  Precipitating Factors: Stress     Epilepsy Risk Factors:  Intellectual disability  Head injury (moderate/severe)     Prior AEDs:  Topiramate stopped 2020 in setting of RTA     Prior Evaluation:  MRI in 2008 was read as normal  Dr Liat Comer last note indicates that her last EEG was unrevealing for source and no focal discharges noted  There was generalized encephalopathy      Prior MRI brain or EEG reports not available in [facility] or [facility] Everywhere for review        Psychiatric History:  Followed by psychiatry, multiple psychiatric medications  Behavioral outbursts       Social History:   Driving: No  Lives Alone: No  Occupation: on permanent disability      Objective    /78 (BP Location: Left arm, Patient Position: Sitting, Cuff Size: Standard)   Pulse 70   Temp (!) 96 8 °F (36 °C) (Temporal)   Ht 5' 4" (1 626 m)   Wt 64 4 kg (142 lb)   LMP  (LMP Unknown)   SpO2 100%   BMI 24 37 kg/m²      General Exam  No acute distress  Neurologic Exam  Mental Status:  Alert, nonverbal  Follows some simple commands  Cranial Nerves:  Attends to visual stimulation from right and left  EOM full, no nystagums  Face symmetric  Motor:  Moves all 4 ext well without evidence of weakness or asymmetry  Coordination: no ataxia when reaching  Gait: wide  ROS:    Review of Systems   Constitutional: Negative  Negative for appetite change and fever  HENT: Negative  Negative for hearing loss, tinnitus, trouble swallowing and voice change  Eyes: Negative  Negative for photophobia and pain  Respiratory: Negative  Negative for shortness of breath  Cardiovascular: Negative  Negative for palpitations  Gastrointestinal: Negative  Negative for nausea and vomiting  Endocrine: Negative  Negative for cold intolerance  Genitourinary: Negative  Negative for dysuria, frequency and urgency  Musculoskeletal: Negative  Negative for myalgias and neck pain  Skin: Negative  Negative for rash  Neurological: Negative  Negative for dizziness, tremors, seizures, syncope, facial asymmetry, speech difficulty, weakness, light-headedness, numbness and headaches  Hematological: Negative  Does not bruise/bleed easily  Psychiatric/Behavioral: Negative  Negative for confusion, hallucinations and sleep disturbance  ROS reviewed and updated as appropriate

## 2022-06-29 NOTE — PATIENT INSTRUCTIONS
Continue current seizure medications unchanged  Let us know if there are seizures or problems with your medication  Return in 6 months to see Aaliyah Brothers

## 2022-07-14 LAB
ALBUMIN SERPL-MCNC: 4.1 G/DL (ref 3.6–5.1)
ALBUMIN/GLOB SERPL: 1.3 (CALC) (ref 1–2.5)
ALP SERPL-CCNC: 76 U/L (ref 31–125)
ALT SERPL-CCNC: 30 U/L (ref 6–29)
AST SERPL-CCNC: 30 U/L (ref 10–35)
BILIRUB SERPL-MCNC: 0.4 MG/DL (ref 0.2–1.2)
BUN SERPL-MCNC: 18 MG/DL (ref 7–25)
BUN/CREAT SERPL: 15 (CALC) (ref 6–22)
CALCIUM SERPL-MCNC: 9.7 MG/DL (ref 8.6–10.2)
CARBAMAZEPINE SERPL-MCNC: 6.6 MG/L (ref 4–12)
CHLORIDE SERPL-SCNC: 106 MMOL/L (ref 98–110)
CO2 SERPL-SCNC: 26 MMOL/L (ref 20–32)
CREAT SERPL-MCNC: 1.17 MG/DL (ref 0.5–0.99)
GFR/BSA.PRED SERPLBLD CYS-BASED-ARV: 57 ML/MIN/1.73M2
GLOBULIN SER CALC-MCNC: 3.1 G/DL (CALC) (ref 1.9–3.7)
GLUCOSE SERPL-MCNC: 88 MG/DL (ref 65–99)
POTASSIUM SERPL-SCNC: 4 MMOL/L (ref 3.5–5.3)
PROT SERPL-MCNC: 7.2 G/DL (ref 6.1–8.1)
SODIUM SERPL-SCNC: 139 MMOL/L (ref 135–146)

## 2022-07-26 ENCOUNTER — ANNUAL EXAM (OUTPATIENT)
Dept: OBGYN CLINIC | Facility: CLINIC | Age: 47
End: 2022-07-26
Payer: MEDICARE

## 2022-07-26 VITALS
BODY MASS INDEX: 24.89 KG/M2 | SYSTOLIC BLOOD PRESSURE: 124 MMHG | DIASTOLIC BLOOD PRESSURE: 68 MMHG | HEIGHT: 64 IN | WEIGHT: 145.8 LBS

## 2022-07-26 DIAGNOSIS — Z30.42 ENCOUNTER FOR SURVEILLANCE OF INJECTABLE CONTRACEPTIVE: ICD-10-CM

## 2022-07-26 DIAGNOSIS — N94.6 DYSMENORRHEA: Primary | ICD-10-CM

## 2022-07-26 PROCEDURE — 99213 OFFICE O/P EST LOW 20 MIN: CPT | Performed by: OBSTETRICS & GYNECOLOGY

## 2022-07-26 RX ORDER — MEDROXYPROGESTERONE ACETATE 150 MG/ML
150 INJECTION, SUSPENSION INTRAMUSCULAR
Qty: 1 ML | Refills: 3 | Status: SHIPPED | OUTPATIENT
Start: 2022-07-26

## 2022-07-26 NOTE — PROGRESS NOTES
Cierra Hurley is a 50 y o  female who presents for annual well woman exam      Patient presents to the office with member of the facility   Patient is not verbal and not able to give any history at all  Patient is not able to said for any exam  Unable to do physical exam at the visit today as well as unable to obtain history from the patient  History obtained from the patient company patient is currently taking Depo-Provera for a long time secondary to dysmenorrhea problem with her menses and her symptom is completely control she does not have any more menses patient is doing well on Depo-Provera  As patient does not have any pain or bleeding on Depo-Provera we can consider continue medication for 1 more year then we will consider [address] level next year to check for menopausal status if [address] level suggest menopausal status we can then consider stopping Depo-Provera    Menstrual History:  OB History        0    Para   0    Term   0       0    AB   0    Living   0       SAB   0    IAB   0    Ectopic   0    Multiple   0    Live Births   0                  No LMP recorded             Review of Systems  Review of Systems       Objective      /68 (BP Location: Left arm, Patient Position: Sitting, Cuff Size: Adult)   Ht 5' 4" (1 626 m)   Wt 66 1 kg (145 lb 12 8 oz)   BMI 25 03 kg/m²     Physical Exam  OBGyn Exam     General:   distracted and Patient is awake alert but not oriented, not able to give any history and not able to said for the exam   Heart:    Lungs:    Abdomen:    Vulva:    Vagina:    Cervix:    Uterus:    Adnexa:  Breast Exam:            Unable to do exam     Assessment     40-year-old female   Had brain injury with encephalopathy   Chronic hypertension  Seizure   Taking Depo-Provera secondary to dysmenorrhea menorrhagia for a long time patient has no problem with Depo-Provera a     Plan   Continue Depo-Provera for 1 more year   Consider [address] next year to check menopausal status   To return to office for Depo-Provera     There are no Patient Instructions on file for this visit

## 2022-08-09 ENCOUNTER — TELEPHONE (OUTPATIENT)
Dept: NEPHROLOGY | Facility: CLINIC | Age: 47
End: 2022-08-09

## 2022-08-09 NOTE — TELEPHONE ENCOUNTER
Appointment Confirmation   Person confirmed appointment with  If not patient, name of the person Reji Ibarra- caregiver    Date and time of appointment 8/10 2:00    Patient acknowledged and will be at appointment? yes    Did you advise the patient that they will need a urine sample if they are a new patient?  N/A    Did you advise the patient to bring their current medications for verification? (including any OTC) Yes    Additional Information

## 2022-08-10 ENCOUNTER — OFFICE VISIT (OUTPATIENT)
Dept: NEPHROLOGY | Facility: CLINIC | Age: 47
End: 2022-08-10
Payer: MEDICARE

## 2022-08-10 VITALS
SYSTOLIC BLOOD PRESSURE: 120 MMHG | HEIGHT: 64 IN | WEIGHT: 148 LBS | BODY MASS INDEX: 25.27 KG/M2 | DIASTOLIC BLOOD PRESSURE: 78 MMHG

## 2022-08-10 DIAGNOSIS — N18.30 BENIGN HYPERTENSION WITH CKD (CHRONIC KIDNEY DISEASE) STAGE III (HCC): Primary | ICD-10-CM

## 2022-08-10 DIAGNOSIS — I12.9 BENIGN HYPERTENSION WITH CKD (CHRONIC KIDNEY DISEASE) STAGE III (HCC): Primary | ICD-10-CM

## 2022-08-10 DIAGNOSIS — E87.8 LOW BICARBONATE LEVEL: ICD-10-CM

## 2022-08-10 DIAGNOSIS — N18.31 STAGE 3A CHRONIC KIDNEY DISEASE (HCC): ICD-10-CM

## 2022-08-10 PROCEDURE — 99214 OFFICE O/P EST MOD 30 MIN: CPT | Performed by: INTERNAL MEDICINE

## 2022-08-10 NOTE — PROGRESS NOTES
NEPHROLOGY OUTPATIENT PROGRESS NOTE   Su Sands 50 y o  female MRN: [id number]  DATE: 8/10/2022  Reason for visit:   Chief Complaint   Patient presents with    Follow-up    Chronic Kidney Disease     ASSESSMENT and PLAN:  Chronic kidney disease stage III (baseline serum creatinine 1 3, previous baseline used to be 1 0 )  - Last serum creatinine 1 1 in July 2022     -CKD suspect secondary to long-term hypertension, chronic long-term lithium related nephrotoxicity (on lithium >12 years which can cause chronic interstitial nephritis )  - We'll check BMP before next visit  -urinalysis in May 2020 bland without hematuria or proteinuria  Urine microalbumin/creatinine ratio nonsignificant in June 2022   -patient is unable to be weaned off lithium due to her multiple significant psych issues as per my prior discussion with psychiatrist  (psychiatrist Dr Felecia Mares Ph - [phone])  -renal ultrasound in February 2019 shows right kidney 10 2 cm, left kidney 9 9 cm, echogenic appearance of right kidney, no hydronephrosis, echogenic appearance in the left kidney     Serum sodium level overall stable at 139  -she is currently not on any fluid restriction  -prior 24 hour UO 2 8 L  Difficult to obtain another 24 hr UO and remains very challenging due to psych issues and unable to comply with proper collection to determine if she truly has polyuria    -prior urine osmolality 239 in February 2019   -UA in May 2020 shows diluted urine  -patient is overall stable on multiple psych medications including lithium   If has worsening hypernatremia or polyuria issues, may need to consider Amiloride      Concern for medullary nephrocalcinosis on renal ultrasound  -no obvious recent episodes of kidney stone flare-up      Hypertension  -due to patient's developmental disorder, patient does not sit still to be able to check blood pressure accurately   she remains agitated at times    -BP well controlled in the office today   -Currently remains on metoprolol      Mild proteinuria, overall improved, last urine microalbumin/creatinine ratio unremarkable as above       Low bicarbonate,  much improved, most recent serum bicarb 26   continued to remain off Topamax since early 2021   -will discontinue sodium bicarb supplement  Diagnoses and all orders for this visit:    Benign hypertension with CKD (chronic kidney disease) stage III ([facility] [address] )  -     Basic metabolic panel; Future  -     Basic metabolic panel; Future  -     CBC; Future  -     Microalbumin / creatinine urine ratio; Future  -     Phosphorus; Future  -     PTH, intact; Future    Stage 3a chronic kidney disease (HCC)  -     Basic metabolic panel; Future  -     Basic metabolic panel; Future  -     CBC; Future  -     Microalbumin / creatinine urine ratio; Future  -     Phosphorus; Future  -     PTH, intact; Future    Low bicarbonate level  -     Basic metabolic panel; Future    Other orders  -     calcium carbonate-vitamin D (OSCAL-D) 500 mg-200 units per tablet; Take 1 tablet by mouth daily with breakfast        SUBJECTIVE / HPI:  Patient is 53-year-old female with significant past medical history of developmental disorder, mental retardation, occipital injury in the past, history of epilepsy, hypertension for at [place] [address], for regular follow-up of CKD  Previous baseline creatinine 1 0 going back to 2013 although her creatinine has been in the range of 1 2-1 3 since January 2016  Most recent serum creatinine stable at baseline  She lives at group home    continue to remain off Topamax  She is unable to provide any history due to underlying intellectual disability   All history is obtained from reviewing medical records and accompanying caregiver     Unable to comment whether patient has any urinary complaint or not  Patient has been on metoprolol for hypertension    She is also on carbamazepine, long-term chronic lithium, buspirone, chlorpromazine, and benztropine     REVIEW OF SYSTEMS:  More than 10 point review of systems were obtained and discussed in length with the patient  Complete review of systems were negative / unremarkable except mentioned above  PHYSICAL EXAM:  Vitals:    08/10/22 1350   BP: 120/78   BP Location: Left arm   Patient Position: Sitting   Cuff Size: Standard   Weight: 67 1 kg (148 lb)   Height: 5' 4" (1 626 m)     Body mass index is 25 4 kg/m²  Physical Exam  Vitals reviewed  Constitutional:       Appearance: She is well-developed  HENT:      Head: Normocephalic and atraumatic  Right Ear: External ear normal       Left Ear: External ear normal    Eyes:      Conjunctiva/sclera: Conjunctivae normal    Cardiovascular:      Comments: No significant edema in legs  Pulmonary:      Effort: Pulmonary effort is normal       Breath sounds: Normal breath sounds  No wheezing or rales  Abdominal:      General: Bowel sounds are normal  There is no distension  Palpations: Abdomen is soft  Musculoskeletal:         General: No deformity  Lymphadenopathy:      Cervical: No cervical adenopathy  Skin:     Findings: No rash  Neurological:      General: No focal deficit present  Mental Status: She is alert  Psychiatric:      Comments: Underlying developmental disorder, intellectual disability, does not answer questions         PAST MEDICAL HISTORY:  Past Medical History:   Diagnosis Date    Brain injury ([facility] [address] )     Encephalopathy     Hypertension     Seizures ([facility] [address] )        PAST SURGICAL HISTORY:  History reviewed  No pertinent surgical history      SOCIAL HISTORY:  Social History     Substance and Sexual Activity   Alcohol Use No     Social History     Substance and Sexual Activity   Drug Use No     Social History     Tobacco Use   Smoking Status Never Smoker   Smokeless Tobacco Never Used       FAMILY HISTORY:  Family History   Problem Relation Age of Onset    No Known Problems Mother     No Known Problems Father        MEDICATIONS:    Current Outpatient Medications:     acetaminophen (TYLENOL) 500 mg tablet, Take 500 mg by mouth every 4 (four) hours as needed for mild pain, Disp: , Rfl:     ammonium lactate (LAC-HYDRIN) 12 % cream, Apply 1 application topically 2 (two) times a day, Disp: , Rfl:     B Complex Vitamins (VITAMIN B COMPLEX PO), Take 1 tablet by mouth 2 (two) times a day, Disp: , Rfl:     benztropine (COGENTIN) 1 mg tablet, Take 1 mg by mouth 2 (two) times a day, Disp: , Rfl:     betamethasone dipropionate (DIPROSONE) 0 05 % cream, Apply 1 application topically 2 (two) times a day, Disp: , Rfl:     busPIRone (BUSPAR) 15 mg tablet, Take 30 mg by mouth 2 (two) times a day , Disp: , Rfl:     Calcium Carb-Cholecalciferol (OYSCO 500 + D) 500 mg-200 units per tablet, Oysco 500/D 500 mg (1,250 mg)-200 unit tablet, Disp: , Rfl:     calcium carbonate-vitamin D (OSCAL-D) 500 mg-200 units per tablet, Take 1 tablet by mouth daily with breakfast, Disp: , Rfl:     carBAMazepine (TEGretol XR) 200 mg 12 hr tablet, Take 2 tablets (400 mg total) by mouth 2 (two) times a day, Disp: 360 tablet, Rfl: 3    chlorhexidine (PERIDEX) 0 12 % solution, Apply 15 mL to the mouth or throat 2 (two) times a day, Disp: , Rfl:     chlorproMAZINE (THORAZINE) 50 mg tablet, Take 50 mg by mouth 3 (three) times a day  , Disp: , Rfl:     clonazePAM (KlonoPIN) 0 5 mg tablet, Take 0 5 mg by mouth 3 (three) times a day , Disp: , Rfl:     fexofenadine (ALLEGRA) 180 MG tablet, Take 180 mg by mouth daily, Disp: , Rfl:     fluticasone (FLONASE) 50 mcg/act nasal spray, 1 spray into each nostril daily , Disp: , Rfl:     lithium carbonate 300 mg capsule, Take 300 mg by mouth 2 (two) times a day with meals, Disp: , Rfl:     medroxyPROGESTERone (DEPO-PROVERA) 150 mg/mL injection, Inject 1 mL (150 mg total) into a muscle every 3 (three) months, Disp: 1 mL, Rfl: 3    metoprolol tartrate (LOPRESSOR) 50 mg tablet, Take 50 mg by mouth 2 (two) times a day, Disp: , Rfl:    montelukast (SINGULAIR) 10 mg tablet, Take 10 mg by mouth daily at bedtime, Disp: , Rfl:     Multiple Vitamins-Minerals (CERTAVITE/ANTIOXIDANTS PO), Take 1 tablet by mouth daily, Disp: , Rfl:     neomycin-bacitracin-polymyxin (NEOSPORIN) 5-400-5,000 ointment, Apply topically 3 (three) times a day, Disp: 28 3 g, Rfl: 0    neomycin-bacitracin-polymyxin b (NEOSPORIN) ointment, Apply 1 application topically daily, Disp: , Rfl:     olopatadine HCl (PATADAY) 0 2 % opth drops, Administer 1 drop to both eyes daily, Disp: , Rfl:     risperiDONE (RisperDAL M-TABS) 2 mg dispersible tablet, Take 1 mg by mouth 3 (three) times a day , Disp: , Rfl:     sodium bicarbonate 650 mg tablet, Take 1 tablet (650 mg total) by mouth daily, Disp: 90 tablet, Rfl: 3    LORazepam (ATIVAN) 1 mg tablet, Take 1 tablet by mouth daily at bedtime as needed (increased activity limiting sleep) (Patient not taking: Reported on 8/10/2022), Disp: 10 tablet, Rfl: 0    Psyllium (METAMUCIL) WAFR, Take 1 Wafer by mouth daily (Patient not taking: Reported on 8/10/2022), Disp: , Rfl:     Lab Results:   Results for orders placed or performed in visit on 07/13/22   Comprehensive metabolic panel   Result Value Ref Range    Glucose, Random 88 65 - 99 mg/dL    BUN 18 7 - 25 mg/dL    Creatinine 1 17 (H) 0 50 - 0 99 mg/dL    eGFR 57 (L) > OR = 60 mL/min/1 73m2    SL AMB BUN/CREATININE RATIO 15 6 - 22 (calc)    Sodium 139 135 - 146 mmol/L    Potassium 4 0 3 5 - 5 3 mmol/L    Chloride 106 98 - 110 mmol/L    CO2 26 20 - 32 mmol/L    Calcium 9 7 8 6 - 10 2 mg/dL    Protein, Total 7 2 6 1 - 8 1 g/dL    Albumin 4 1 3 6 - 5 1 g/dL    Globulin 3 1 1 9 - 3 7 g/dL (calc)    Albumin/Globulin Ratio 1 3 1 0 - 2 5 (calc)    TOTAL BILIRUBIN 0 4 0 2 - 1 2 mg/dL    Alkaline Phosphatase 76 31 - 125 U/L    AST 30 10 - 35 U/L    ALT 30 (H) 6 - 29 U/L   Carbamazepine level, total   Result Value Ref Range    Carbamazepine, Serum 6 6 4 0 - 12 0 mg/L

## 2022-08-23 ENCOUNTER — TELEPHONE (OUTPATIENT)
Dept: NEPHROLOGY | Facility: CLINIC | Age: 47
End: 2022-08-23

## 2022-08-25 ENCOUNTER — CLINICAL SUPPORT (OUTPATIENT)
Dept: OBGYN CLINIC | Facility: CLINIC | Age: 47
End: 2022-08-25
Payer: MEDICARE

## 2022-08-25 VITALS
BODY MASS INDEX: 25.16 KG/M2 | DIASTOLIC BLOOD PRESSURE: 70 MMHG | WEIGHT: 147.4 LBS | HEIGHT: 64 IN | SYSTOLIC BLOOD PRESSURE: 118 MMHG

## 2022-08-25 DIAGNOSIS — Z30.42 ENCOUNTER FOR SURVEILLANCE OF INJECTABLE CONTRACEPTIVE: Primary | ICD-10-CM

## 2022-08-25 PROCEDURE — 96372 THER/PROPH/DIAG INJ SC/IM: CPT

## 2022-08-25 RX ORDER — MEDROXYPROGESTERONE ACETATE 150 MG/ML
150 INJECTION, SUSPENSION INTRAMUSCULAR ONCE
Status: COMPLETED | OUTPATIENT
Start: 2022-08-25 | End: 2022-08-25

## 2022-08-25 RX ADMIN — MEDROXYPROGESTERONE ACETATE 150 MG: 150 INJECTION, SUSPENSION INTRAMUSCULAR at 11:14

## 2022-09-19 ENCOUNTER — TELEPHONE (OUTPATIENT)
Dept: NEPHROLOGY | Facility: CLINIC | Age: 47
End: 2022-09-19

## 2022-09-19 NOTE — TELEPHONE ENCOUNTER
Spoke with caregiver Valeriy Coma and schedule appointment for 2/14/23 with Dr Gunnar Winston in the [facility]

## 2022-10-04 ENCOUNTER — OFFICE VISIT (OUTPATIENT)
Dept: CARDIOLOGY CLINIC | Facility: CLINIC | Age: 47
End: 2022-10-04
Payer: MEDICARE

## 2022-10-04 VITALS
BODY MASS INDEX: 25.74 KG/M2 | HEART RATE: 63 BPM | WEIGHT: 150.8 LBS | DIASTOLIC BLOOD PRESSURE: 70 MMHG | SYSTOLIC BLOOD PRESSURE: 122 MMHG | HEIGHT: 64 IN

## 2022-10-04 DIAGNOSIS — R94.31 ABNORMAL EKG: ICD-10-CM

## 2022-10-04 DIAGNOSIS — I12.9 BENIGN HYPERTENSION WITH CKD (CHRONIC KIDNEY DISEASE) STAGE III (HCC): Primary | ICD-10-CM

## 2022-10-04 DIAGNOSIS — Z01.810 PRE-OPERATIVE CARDIOVASCULAR EXAMINATION: ICD-10-CM

## 2022-10-04 DIAGNOSIS — N18.30 BENIGN HYPERTENSION WITH CKD (CHRONIC KIDNEY DISEASE) STAGE III (HCC): Primary | ICD-10-CM

## 2022-10-04 PROCEDURE — 93000 ELECTROCARDIOGRAM COMPLETE: CPT | Performed by: INTERNAL MEDICINE

## 2022-10-04 PROCEDURE — 99213 OFFICE O/P EST LOW 20 MIN: CPT | Performed by: INTERNAL MEDICINE

## 2022-10-04 NOTE — PROGRESS NOTES
Cardiology Follow Up    Eduardo Awad  1975  [id number]  [address]  [phone]    1  Benign hypertension with CKD (chronic kidney disease) stage III [facility])         Discussion: Ms Erick Cooks was seen for an ECG prior to general anesthesia for dental work  She has had no symptoms or episodes suggestive of cardiac disease  BP is well controlled  The ECG is unchanged and without evidence of ischemia  There are no cardiac contraindications to anesthesia or surgery  We will see her prn  Cardiovascular History:  Ms Erick Cooks has a longstanding history of pervasive developmental disorder  There is no cardiac history  There have been no symptoms of dyspnea, chest discomfort, or syncope  She was initially referred in 2021 because of an abnormal ECG  Current and old ECGs show sinus rhythm with minor nonspecific T-wave flattening but no ischemic changes, repolarization abnormalities, or QT prolongation  She does take multiple psychoactive medications that may be associated with ST-T changes  In the absence of symptoms, no further workup is warranted, and she may proceed with any indicated procedures  She carries the label of grade 3 CKD  Most recent Cr was 1 17 in 7/22       Patient Active Problem List   Diagnosis    Benign hypertension with CKD (chronic kidney disease) stage III (HCC)    Stage 3a chronic kidney disease (HCC)    Low bicarbonate level    Partial symptomatic epilepsy ([facility] [address] )    PDD (pervasive developmental disorder)     Past Medical History:   Diagnosis Date    Brain injury ([facility] [address] )     Encephalopathy     Hypertension     Seizures ([facility] [address] )      Social History     Socioeconomic History    Marital status: Single     Spouse name: Not on file    Number of children: Not on file    Years of education: Not on file    Highest education level: Not on file   Occupational History    Not on file   Tobacco Use    Smoking status: Never Smoker    Smokeless tobacco: Never Used   Vaping Use    Vaping Use: Never used   Substance and Sexual Activity    Alcohol use: No    Drug use: No    Sexual activity: Never   Other Topics Concern    Not on file   Social History Narrative    Not on file     Social Determinants of Health     Financial Resource Strain: Not on file   Food Insecurity: Not on file   Transportation Needs: Not on file   Physical Activity: Not on file   Stress: Not on file   Social Connections: Not on file   Intimate Partner Violence: Not on file   Housing Stability: Not on file      Family History   Problem Relation Age of Onset    No Known Problems Mother     No Known Problems Father      No past surgical history on file      Current Outpatient Medications:     acetaminophen (TYLENOL) 500 mg tablet, Take 500 mg by mouth every 4 (four) hours as needed for mild pain, Disp: , Rfl:     ammonium lactate (LAC-HYDRIN) 12 % cream, Apply 1 application topically 2 (two) times a day, Disp: , Rfl:     B Complex Vitamins (VITAMIN B COMPLEX PO), Take 1 tablet by mouth 2 (two) times a day, Disp: , Rfl:     benztropine (COGENTIN) 1 mg tablet, Take 1 mg by mouth 2 (two) times a day, Disp: , Rfl:     betamethasone dipropionate (DIPROSONE) 0 05 % cream, Apply 1 application topically 2 (two) times a day, Disp: , Rfl:     busPIRone (BUSPAR) 15 mg tablet, Take 30 mg by mouth 2 (two) times a day , Disp: , Rfl:     Calcium Carb-Cholecalciferol (OYSCO 500 + D) 500 mg-200 units per tablet, Oysco 500/D 500 mg (1,250 mg)-200 unit tablet, Disp: , Rfl:     calcium carbonate-vitamin D (OSCAL-D) 500 mg-200 units per tablet, Take 1 tablet by mouth daily with breakfast, Disp: , Rfl:     carBAMazepine (TEGretol XR) 200 mg 12 hr tablet, Take 2 tablets (400 mg total) by mouth 2 (two) times a day, Disp: 360 tablet, Rfl: 3    chlorhexidine (PERIDEX) 0 12 % solution, Apply 15 mL to the mouth or throat 2 (two) times a day, Disp: , Rfl:     chlorproMAZINE (THORAZINE) 50 mg tablet, Take 50 mg by mouth 3 (three) times a day  , Disp: , Rfl:     clonazePAM (KlonoPIN) 0 5 mg tablet, Take 0 5 mg by mouth 3 (three) times a day , Disp: , Rfl:     fexofenadine (ALLEGRA) 180 MG tablet, Take 180 mg by mouth daily, Disp: , Rfl:     fluticasone (FLONASE) 50 mcg/act nasal spray, 1 spray into each nostril daily , Disp: , Rfl:     lithium carbonate 300 mg capsule, Take 300 mg by mouth 2 (two) times a day with meals, Disp: , Rfl:     medroxyPROGESTERone (DEPO-PROVERA) 150 mg/mL injection, Inject 1 mL (150 mg total) into a muscle every 3 (three) months, Disp: 1 mL, Rfl: 3    metoprolol tartrate (LOPRESSOR) 50 mg tablet, Take 50 mg by mouth 2 (two) times a day, Disp: , Rfl:     montelukast (SINGULAIR) 10 mg tablet, Take 10 mg by mouth daily at bedtime, Disp: , Rfl:     Multiple Vitamins-Minerals (CERTAVITE/ANTIOXIDANTS PO), Take 1 tablet by mouth daily, Disp: , Rfl:     LORazepam (ATIVAN) 1 mg tablet, Take 1 tablet by mouth daily at bedtime as needed (increased activity limiting sleep) (Patient not taking: No sig reported), Disp: 10 tablet, Rfl: 0    neomycin-bacitracin-polymyxin (NEOSPORIN) 5-400-5,000 ointment, Apply topically 3 (three) times a day, Disp: 28 3 g, Rfl: 0    neomycin-bacitracin-polymyxin b (NEOSPORIN) ointment, Apply 1 application topically daily, Disp: , Rfl:     olopatadine HCl (PATADAY) 0 2 % opth drops, Administer 1 drop to both eyes daily, Disp: , Rfl:     Psyllium (METAMUCIL) WAFR, Take 1 Wafer by mouth daily (Patient not taking: No sig reported), Disp: , Rfl:     risperiDONE (RisperDAL M-TABS) 2 mg dispersible tablet, Take 1 mg by mouth 3 (three) times a day , Disp: , Rfl:     sodium bicarbonate 650 mg tablet, Take 1 tablet (650 mg total) by mouth daily, Disp: 90 tablet, Rfl: 3  Allergies   Allergen Reactions    Inderal [Propranolol] Shortness Of Breath    Catapres [Clonidine Hcl]     Other      Nuts Labs:  Orders Only on 07/13/2022   Component Date Value    Glucose, Random 07/13/2022 88     BUN 07/13/2022 18     Creatinine 07/13/2022 1 17 (A)    eGFR 07/13/2022 57 (A)    SL AMB BUN/CREATININE RA* 07/13/2022 15     Sodium 07/13/2022 139     Potassium 07/13/2022 4 0     Chloride 07/13/2022 106     CO2 07/13/2022 26     Calcium 07/13/2022 9 7     Protein, Total 07/13/2022 7 2     Albumin 07/13/2022 4 1     Globulin 07/13/2022 3 1     Albumin/Globulin Ratio 07/13/2022 1 3     TOTAL BILIRUBIN 07/13/2022 0 4     Alkaline Phosphatase 07/13/2022 76     AST 07/13/2022 30     ALT 07/13/2022 30 (A)    Carbamazepine, Serum 07/13/2022 6 6    Orders Only on 06/07/2022   Component Date Value    Carbamazepine, Serum 06/07/2022 7 0    Orders Only on 06/07/2022   Component Date Value    Creatinine, Urine 06/07/2022 31     Microalbum  ,U,Random 06/07/2022 <0 2     Microalb/Creat Ratio 06/07/2022 NOTE     PTH, Intact 06/07/2022 17     Calcium 06/07/2022 10 1     Magnesium, Serum 06/07/2022 2 0     Phosphorus, Serum 06/07/2022 4 2     Glucose, Random 06/07/2022 88     BUN 06/07/2022 16     Creatinine 06/07/2022 1 24 (A)    eGFR Non  06/07/2022 51 (A)    eGFR  06/07/2022 59 (A)    SL AMB BUN/CREATININE RA* 06/07/2022 13     Sodium 06/07/2022 138     Potassium 06/07/2022 3 7     Chloride 06/07/2022 104     CO2 06/07/2022 28     Calcium 06/07/2022 10 1      Imaging: No results found  Review of Systems:  Review of Systems   Constitutional: Negative  HENT: Negative  Eyes: Negative  Cardiovascular: Negative  Respiratory: Negative  Endocrine: Negative  Hematologic/Lymphatic: Negative  Skin: Negative  Musculoskeletal: Negative  Gastrointestinal: Negative  Genitourinary: Negative  Neurological: Negative  Psychiatric/Behavioral: Negative  Allergic/Immunologic: Negative  All other systems reviewed and are negative        There were no vitals filed for this visit  Weight (last 2 days)     None          Physical Exam:  Physical Exam  Vitals reviewed  Constitutional:       General: She is not in acute distress  Appearance: She is well-developed  She is not diaphoretic  HENT:      Head: Normocephalic and atraumatic  Eyes:      General: No scleral icterus  Conjunctiva/sclera: Conjunctivae normal    Neck:      Vascular: No JVD  Trachea: No tracheal deviation  Cardiovascular:      Rate and Rhythm: Normal rate and regular rhythm  Pulses: Intact distal pulses  Heart sounds: Normal heart sounds  No murmur heard  No friction rub  No gallop  Pulmonary:      Effort: Pulmonary effort is normal  No respiratory distress  Breath sounds: Normal breath sounds  No stridor  No wheezing or rales  Chest:      Chest wall: No tenderness  Abdominal:      General: Bowel sounds are normal  There is no distension  Palpations: Abdomen is soft  Tenderness: There is no abdominal tenderness  Musculoskeletal:         General: No tenderness  Normal range of motion  Cervical back: Normal range of motion and neck supple  Skin:     General: Skin is warm and dry  Findings: No erythema  Neurological:      Mental Status: She is alert and oriented to person, place, and time  Cranial Nerves: No cranial nerve deficit  Coordination: Coordination normal    Psychiatric:         Behavior: Behavior normal          Thought Content:  Thought content normal          Judgment: Judgment normal          Reg Lopez

## 2022-11-01 LAB
BUN SERPL-MCNC: 17 MG/DL (ref 7–25)
BUN/CREAT SERPL: 14 (CALC) (ref 6–22)
CALCIUM SERPL-MCNC: 9.8 MG/DL (ref 8.6–10.2)
CHLORIDE SERPL-SCNC: 107 MMOL/L (ref 98–110)
CO2 SERPL-SCNC: 24 MMOL/L (ref 20–32)
CREAT SERPL-MCNC: 1.19 MG/DL (ref 0.5–0.99)
GFR/BSA.PRED SERPLBLD CYS-BASED-ARV: 56 ML/MIN/1.73M2
GLUCOSE SERPL-MCNC: 136 MG/DL (ref 65–99)
POTASSIUM SERPL-SCNC: 3.8 MMOL/L (ref 3.5–5.3)
SODIUM SERPL-SCNC: 140 MMOL/L (ref 135–146)

## 2022-11-02 ENCOUNTER — TELEPHONE (OUTPATIENT)
Dept: NEPHROLOGY | Facility: CLINIC | Age: 47
End: 2022-11-02

## 2022-11-10 ENCOUNTER — CLINICAL SUPPORT (OUTPATIENT)
Dept: OBGYN CLINIC | Facility: CLINIC | Age: 47
End: 2022-11-10

## 2022-11-10 VITALS
WEIGHT: 150.6 LBS | HEIGHT: 64 IN | DIASTOLIC BLOOD PRESSURE: 70 MMHG | BODY MASS INDEX: 25.71 KG/M2 | SYSTOLIC BLOOD PRESSURE: 120 MMHG

## 2022-11-10 DIAGNOSIS — Z30.42 ENCOUNTER FOR SURVEILLANCE OF INJECTABLE CONTRACEPTIVE: Primary | ICD-10-CM

## 2022-11-10 RX ORDER — MEDROXYPROGESTERONE ACETATE 150 MG/ML
150 INJECTION, SUSPENSION INTRAMUSCULAR ONCE
Status: COMPLETED | OUTPATIENT
Start: 2022-11-10 | End: 2022-11-10

## 2022-11-10 RX ORDER — TRAZODONE HYDROCHLORIDE 100 MG/1
100 TABLET ORAL
COMMUNITY

## 2022-11-10 RX ADMIN — MEDROXYPROGESTERONE ACETATE 150 MG: 150 INJECTION, SUSPENSION INTRAMUSCULAR at 11:41

## 2022-12-29 ENCOUNTER — OFFICE VISIT (OUTPATIENT)
Dept: NEUROLOGY | Facility: CLINIC | Age: 47
End: 2022-12-29

## 2022-12-29 VITALS
HEART RATE: 72 BPM | DIASTOLIC BLOOD PRESSURE: 80 MMHG | BODY MASS INDEX: 26.36 KG/M2 | TEMPERATURE: 98.2 F | HEIGHT: 64 IN | WEIGHT: 154.4 LBS | SYSTOLIC BLOOD PRESSURE: 122 MMHG

## 2022-12-29 DIAGNOSIS — G40.209 PARTIAL SYMPTOMATIC EPILEPSY WITH COMPLEX PARTIAL SEIZURES, NOT INTRACTABLE, WITHOUT STATUS EPILEPTICUS (HCC): Primary | ICD-10-CM

## 2022-12-29 RX ORDER — HYDROXYZINE HYDROCHLORIDE 25 MG/1
25 TABLET, FILM COATED ORAL 3 TIMES DAILY
COMMUNITY
Start: 2022-12-13

## 2022-12-29 RX ORDER — OFLOXACIN 3 MG/ML
1 SOLUTION/ DROPS OPHTHALMIC AS NEEDED
COMMUNITY

## 2022-12-29 RX ORDER — DIAZEPAM 2 MG/1
2 TABLET ORAL EVERY 8 HOURS PRN
COMMUNITY

## 2022-12-29 NOTE — PATIENT INSTRUCTIONS
- Continue current dose of carbamazepine  mg twice per day  - Call the office with any seizure or concerns  - No blood work for now - recent carbamazepine level and sodium look good  - Follow up in 6 months or sooner if needed

## 2022-12-29 NOTE — ASSESSMENT & PLAN NOTE
Patient continues to be seizure free since last visit on carbamazepine  mg BID  Prior use of topiramate may have contributed to metabolic acidosis  Overall she has been doing well from a seizure standpoint  Mood is stable at this time  Sodium continues to be stable  Most recent carbamazepine level in July was 6 6  Neurological exam was at baseline at today's visit  While there are no MRI brain or EEG reports available for review, prior neurology notes do report that her most recent EEG showed slowing and her prior MRI brain was unrevealing  She likely would not tolerate EEG testing and if MRI brain was needed in the future she would likely require sedation  Patient will continue with carbamazepine  mg BID as she is tolerating this medication well without notable issues or concerns  Caretakers will call the office with any breakthrough seizures or concerns prior to next visit  Follow up in 6 months with Dr Florrie Kehr or sooner if needed

## 2022-12-29 NOTE — PROGRESS NOTES
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, who is returning to Neurology office for follow up of her seizures  Assessment/Plan:    Partial symptomatic epilepsy [facility])  Patient continues to be seizure free since last visit on carbamazepine  mg BID  Prior use of topiramate may have contributed to metabolic acidosis  Overall she has been doing well from a seizure standpoint  Mood is stable at this time  Sodium continues to be stable  Most recent carbamazepine level in July was 6 6  Neurological exam was at baseline at today's visit  While there are no MRI brain or EEG reports available for review, prior neurology notes do report that her most recent EEG showed slowing and her prior MRI brain was unrevealing  She likely would not tolerate EEG testing and if MRI brain was needed in the future she would likely require sedation  Patient will continue with carbamazepine  mg BID as she is tolerating this medication well without notable issues or concerns  Caretakers will call the office with any breakthrough seizures or concerns prior to next visit  Follow up in 6 months with Dr Lo or sooner if needed  She will Return in about 6 months (around 6/29/2023) for Follow up with Dr Lo  Subjective:  Phil Wiggins is a 50 y o  female with 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, who is returning to Neurology office for follow up of her seizures  She was last seen in the office on 6/29/22  At that time, there were no recurrent seizures or concerns  Recommended continuing with current AED regimen and following up in 6 months  Since her last visit, she has continued to be seizure free  On carbamazepine  mg twice per day  No side effects or concerns  Most recent sodium in November was stable at 140  No s/s of hyponatremia  Behavior stable at this time, no change to baseline  Following with psychiatry  No new medical issues or concerns since last visit  Current seizure medications:  - carbamazepine xr 400 mg bid  Other medications as per Epic  Latest Reference Range & Units 03/02/20 07:41 03/09/21 07:22 06/07/22 07:38 07/13/22 07:41   CARBAMAZEPINE LEVEL 4 0 - 12 0 mg/L  7 3 7 1 7 0 6 6       Event/Seizure semiology:  Violent shaking with foaming at the mouth that can be preceded by a loud scream     Special Features  Status epilepticus:  Not known  Self Injury Seizures:  None known  Precipitating Factors: Stress     Epilepsy Risk Factors:  Intellectual disability  Head injury (moderate/severe)     Prior AEDs:  Topiramate stopped 2020 in setting of RTA     Prior Evaluation:  MRI in 2008 was read as normal  Dr Ryland Alcantara last note indicates that her last EEG was unrevealing for source and no focal discharges noted  There was generalized encephalopathy      Prior MRI brain or EEG reports not available in [facility] or [employer] Everywhere for review        Psychiatric History:  Followed by psychiatry, multiple psychiatric medications  Behavioral outbursts       Social History:   Driving: No  Lives Alone: No  Occupation: on permanent disability     Her history was also obtained from her caretaker, who was present at today's visit  I reviewed prior neurology notes, most recent labs, as documented in Epic/CareEverywhere, and summarized above  Objective:    Blood pressure 122/80, pulse 72, temperature 98 2 °F (36 8 °C), temperature source Temporal, height 5' 4" (1 626 m), weight 70 kg (154 lb 6 4 oz)  Physical Exam  No apparent distress  Appears well nourished  Mood appropriate for situation     Neurologic Exam - limited due to cooperation  Mental status- alert  Non verbal  Follows some simple commands (high five,  your feet, lift up your arms)    Cranial Nerves- PERRL, tracks examiner in room, facial muscles symmetric    Motor- Appropriate strength  Moves all extremities freely   No tremor  Sensory-  Intact distally in all extremities to light touch  DTRs- unable to assess due to cooperation  Gait- wide based casual gait  Coordination- unable to participate in FNF, high fives without issues with both hands  ROS:  Review of Systems   Constitutional: Negative  Negative for appetite change and fever  HENT: Negative  Negative for hearing loss, tinnitus, trouble swallowing and voice change  Eyes: Negative  Negative for photophobia, pain and visual disturbance  Respiratory: Negative  Negative for shortness of breath  Cardiovascular: Negative  Negative for palpitations  Gastrointestinal: Negative  Negative for nausea and vomiting  Endocrine: Negative  Negative for cold intolerance  Genitourinary: Negative  Negative for dysuria, frequency and urgency  Musculoskeletal: Negative  Negative for gait problem, myalgias and neck pain  Skin: Negative  Negative for rash  Allergic/Immunologic: Negative  Neurological: Negative  Negative for dizziness, tremors, seizures, syncope, facial asymmetry, speech difficulty, weakness, light-headedness, numbness and headaches  Hematological: Negative  Does not bruise/bleed easily  Psychiatric/Behavioral: Negative  Negative for confusion, hallucinations and sleep disturbance  All other systems reviewed and are negative      ROS obtained by MA and reviewed by myself  This note may have been created using voice recognition software  There may be unintentional errors such as grammatical errors, spelling errors, or pronoun errors

## 2023-01-26 ENCOUNTER — CLINICAL SUPPORT (OUTPATIENT)
Dept: OBGYN CLINIC | Facility: CLINIC | Age: 48
End: 2023-01-26

## 2023-01-26 VITALS
WEIGHT: 154 LBS | BODY MASS INDEX: 26.29 KG/M2 | HEIGHT: 64 IN | SYSTOLIC BLOOD PRESSURE: 126 MMHG | DIASTOLIC BLOOD PRESSURE: 68 MMHG

## 2023-01-26 DIAGNOSIS — Z30.42 ENCOUNTER FOR SURVEILLANCE OF INJECTABLE CONTRACEPTIVE: ICD-10-CM

## 2023-01-26 RX ORDER — MEDROXYPROGESTERONE ACETATE 150 MG/ML
150 INJECTION, SUSPENSION INTRAMUSCULAR ONCE
Status: COMPLETED | OUTPATIENT
Start: 2023-01-26 | End: 2023-01-26

## 2023-01-26 RX ADMIN — MEDROXYPROGESTERONE ACETATE 150 MG: 150 INJECTION, SUSPENSION INTRAMUSCULAR at 10:50

## 2023-02-13 ENCOUNTER — TELEPHONE (OUTPATIENT)
Dept: NEPHROLOGY | Facility: CLINIC | Age: 48
End: 2023-02-13

## 2023-02-13 NOTE — TELEPHONE ENCOUNTER
Appointment Confirmation   Person confirmed appointment with  If not patient, name of the person Ezio Ladd    Date and time of appointment 3 2/14    Patient acknowledged and will be at appointment? yes    Did you advise the patient that they will need a urine sample if they are a new patient?  N/A    Did you advise the patient to bring their current medications for verification? (including any OTC) Yes    Additional Information

## 2023-02-14 ENCOUNTER — OFFICE VISIT (OUTPATIENT)
Dept: NEPHROLOGY | Facility: CLINIC | Age: 48
End: 2023-02-14

## 2023-02-14 VITALS
BODY MASS INDEX: 26.98 KG/M2 | WEIGHT: 158 LBS | SYSTOLIC BLOOD PRESSURE: 134 MMHG | DIASTOLIC BLOOD PRESSURE: 80 MMHG | HEIGHT: 64 IN

## 2023-02-14 DIAGNOSIS — E87.6 HYPOKALEMIA: ICD-10-CM

## 2023-02-14 DIAGNOSIS — N18.31 STAGE 3A CHRONIC KIDNEY DISEASE (HCC): ICD-10-CM

## 2023-02-14 DIAGNOSIS — E87.8 LOW BICARBONATE LEVEL: ICD-10-CM

## 2023-02-14 DIAGNOSIS — I12.9 BENIGN HYPERTENSION WITH CKD (CHRONIC KIDNEY DISEASE) STAGE III (HCC): Primary | ICD-10-CM

## 2023-02-14 DIAGNOSIS — N18.30 BENIGN HYPERTENSION WITH CKD (CHRONIC KIDNEY DISEASE) STAGE III (HCC): Primary | ICD-10-CM

## 2023-02-14 RX ORDER — POTASSIUM CHLORIDE 20 MEQ/1
20 TABLET, EXTENDED RELEASE ORAL DAILY
Qty: 5 TABLET | Refills: 0 | Status: SHIPPED | OUTPATIENT
Start: 2023-02-14

## 2023-02-14 NOTE — PROGRESS NOTES
NEPHROLOGY OUTPATIENT PROGRESS NOTE   Fish El 50 y o  female MRN: [id number]  DATE: 2/14/2023  Reason for visit:   Chief Complaint   Patient presents with   • Follow-up   • Chronic Kidney Disease     ASSESSMENT and PLAN:  Chronic kidney disease stage III (baseline serum creatinine 1 3, previous baseline used to be 1 0)  -Last serum creatinine 1 2 in February 2023 overall stable  -CKD suspect secondary to long-term hypertension, chronic long-term lithium related nephrotoxicity (on lithium >12 years which can cause chronic interstitial nephritis )  - We'll check BMP before next visit   -UA bland without hematuria or proteinuria in February 2023  Urine microalbumin/creatinine ratio nonsignificant in June 2022   -patient is unable to be weaned off lithium due to her multiple significant psych issues as per my prior discussion with psychiatrist  (psychiatrist Dr Theodora Brady Ph - [phone])  -renal ultrasound in February 2019 shows right kidney 10 2 cm, left kidney 9 9 cm, echogenic appearance of right kidney, no hydronephrosis, echogenic appearance in the left kidney     Serum sodium level overall stable at 137  -she is currently not on any fluid restriction  -prior 24 hour UO 2 8 L  Difficult to obtain another 24 hr UO and remains very challenging due to psych issues and unable to comply with proper collection to determine if she truly has polyuria    -prior urine osmolality 239 in February 2019   -UA in February 2023 shows dilated urine with specific gravity 1 003  -patient is overall stable on multiple psych medications including lithium   If has worsening hypernatremia or polyuria issues, may need to consider Amiloride  Hypokalemia, serum potassium 3 2 below goal during her recent ER visit in February 2023   -We will start potassium chloride 20 mg daily for total 5 days only, repeat serum potassium level in 1 week    -Magnesium level within normal range     Concern for medullary nephrocalcinosis on prior renal ultrasound  -no obvious recent episodes of kidney stone flare-up      Hypertension  -due to patient's developmental disorder, patient does not sit still to be able to check blood pressure accurately   she remains agitated throughout the entire office visit today  -BP acceptable  -Currently remains on metoprolol      Mild proteinuria, overall improved, last urine microalbumin/creatinine ratio unremarkable as above       Low bicarbonate, resolved  continued to remain off Topamax since early 2021   -Now remains off sodium bicarb supplements  Last bicarb level 27  Diagnoses and all orders for this visit:    Benign hypertension with CKD (chronic kidney disease) stage III ([facility] [address] )  -     Basic metabolic panel; Future  -     CBC; Future  -     Phosphorus; Future  -     PTH, intact; Future  -     Microalbumin / creatinine urine ratio; Future  -     Magnesium; Future    Stage 3a chronic kidney disease (HCC)  -     Basic metabolic panel; Future  -     CBC; Future  -     Phosphorus; Future  -     PTH, intact; Future  -     Microalbumin / creatinine urine ratio; Future  -     Magnesium; Future    Low bicarbonate level    Hypokalemia  -     potassium chloride (K-DUR,KLOR-CON) 20 mEq tablet; Take 1 tablet (20 mEq total) by mouth daily  -     Potassium; Future  -     Basic metabolic panel; Future        SUBJECTIVE / HPI:  Patient is 40-year-old female with significant past medical history of developmental disorder, mental retardation, occipital injury in the past, history of epilepsy, hypertension for at [place] [address], for regular follow-up of CKD   Previous baseline creatinine 1 0 going back to 2013 although her creatinine has been in the range of 1 2-1 3 since January 2016  Most recent serum creatinine stable at baseline  She lives at group home  continue to remain off Topamax  Jayy Malena is unable to provide any history due to underlying intellectual disability   All history is obtained from reviewing medical records and accompanying caregiver       She recently had an episode of seizure when she went to ER  Has upcoming neurology and psychiatry appointment  Unable to comment whether patient has any urinary complaint or not  Patient has been on metoprolol for hypertension  She is also on long-term chronic lithium, buspirone, chlorpromazine, and benztropine     REVIEW OF SYSTEMS:  Unable to obtain any review of system due to patient's intellectual disability and above-mentioned psych issues  PHYSICAL EXAM:  Vitals:    02/14/23 1507 02/14/23 1523   BP:  134/80   Weight: 71 7 kg (158 lb)    Height: 5' 4" (1 626 m)      Body mass index is 27 12 kg/m²  Physical Exam  Vitals reviewed  Constitutional:       Appearance: She is well-developed  HENT:      Right Ear: External ear normal       Left Ear: External ear normal    Eyes:      Conjunctiva/sclera: Conjunctivae normal    Cardiovascular:      Comments: No significant edema in legs  Pulmonary:      Effort: Pulmonary effort is normal       Breath sounds: Normal breath sounds  No wheezing or rales  Abdominal:      General: Bowel sounds are normal  There is no distension  Palpations: Abdomen is soft  Musculoskeletal:         General: No deformity  Lymphadenopathy:      Cervical: No cervical adenopathy  Skin:     Findings: No rash  Neurological:      Mental Status: She is alert  Coordination: Abnormal coordination:      Psychiatric:      Comments: Overall agitated, underlying intellectual disability  PAST MEDICAL HISTORY:  Past Medical History:   Diagnosis Date   • Brain injury    • Encephalopathy    • Hypertension    • Seizures ([address] )        PAST SURGICAL HISTORY:  History reviewed  No pertinent surgical history      SOCIAL HISTORY:  Social History     Substance and Sexual Activity   Alcohol Use No     Social History     Substance and Sexual Activity   Drug Use No     Social History     Tobacco Use   Smoking Status Never   Smokeless Tobacco Never FAMILY HISTORY:  Family History   Problem Relation Age of Onset   • No Known Problems Mother    • No Known Problems Father        MEDICATIONS:    Current Outpatient Medications:   •  acetaminophen (TYLENOL) 500 mg tablet, Take 500 mg by mouth every 4 (four) hours as needed for mild pain, Disp: , Rfl:   •  ammonium lactate (LAC-HYDRIN) 12 % cream, Apply 1 application topically 2 (two) times a day, Disp: , Rfl:   •  B Complex Vitamins (VITAMIN B COMPLEX PO), Take 1 tablet by mouth 2 (two) times a day, Disp: , Rfl:   •  benztropine (COGENTIN) 1 mg tablet, Take 1 mg by mouth 2 (two) times a day, Disp: , Rfl:   •  betamethasone dipropionate (DIPROSONE) 0 05 % cream, Apply 1 application topically 2 (two) times a day, Disp: , Rfl:   •  busPIRone (BUSPAR) 15 mg tablet, Take 30 mg by mouth 2 (two) times a day , Disp: , Rfl:   •  Calcium Carb-Cholecalciferol (OYSCO 500 + D) 500 mg-200 units per tablet, 2 (two) times a day, Disp: , Rfl:   •  carBAMazepine (TEGretol XR) 200 mg 12 hr tablet, Take 2 tablets (400 mg total) by mouth 2 (two) times a day, Disp: 360 tablet, Rfl: 3  •  chlorhexidine (PERIDEX) 0 12 % solution, Apply 15 mL to the mouth or throat 2 (two) times a day, Disp: , Rfl:   •  chlorproMAZINE (THORAZINE) 50 mg tablet, Take 50 mg by mouth 3 (three) times a day  , Disp: , Rfl:   •  clonazePAM (KlonoPIN) 0 5 mg tablet, Take 0 5 mg by mouth 3 (three) times a day , Disp: , Rfl:   •  Cold Sore Products (HERPECIN-L EX), Apply topically if needed, Disp: , Rfl:   •  diazepam (VALIUM) 2 mg tablet, Take 2 mg by mouth every 8 (eight) hours as needed for anxiety, Disp: , Rfl:   •  fexofenadine (ALLEGRA) 180 MG tablet, Take 180 mg by mouth daily, Disp: , Rfl:   •  hydrOXYzine HCL (ATARAX) 25 mg tablet, Take 25 mg by mouth 3 (three) times a day, Disp: , Rfl:   •  lithium carbonate 300 mg capsule, Take 300 mg by mouth 2 (two) times a day with meals, Disp: , Rfl:   •  medroxyPROGESTERone (DEPO-PROVERA) 150 mg/mL injection, Inject 1 mL (150 mg total) into a muscle every 3 (three) months, Disp: 1 mL, Rfl: 3  •  metoprolol tartrate (LOPRESSOR) 50 mg tablet, Take 50 mg by mouth 2 (two) times a day, Disp: , Rfl:   •  Misc   Devices (T[place]te Bite Block) MISC, Use 2 (two) times a day, Disp: , Rfl:   •  montelukast (SINGULAIR) 10 mg tablet, Take 10 mg by mouth daily at bedtime, Disp: , Rfl:   •  neomycin-bacitracin-polymyxin b (NEOSPORIN) ointment, Apply 1 application topically if needed, Disp: , Rfl:   •  ofloxacin (OCUFLOX) 0 3 % ophthalmic solution, 1 drop if needed, Disp: , Rfl:   •  olopatadine HCl (PATADAY) 0 2 % opth drops, Administer 1 drop to both eyes daily, Disp: , Rfl:   •  potassium chloride (K-DUR,KLOR-CON) 20 mEq tablet, Take 1 tablet (20 mEq total) by mouth daily, Disp: 5 tablet, Rfl: 0  •  Psyllium (METAMUCIL) WAFR, Take 1 Wafer by mouth daily, Disp: , Rfl:   •  risperiDONE (RisperDAL M-TABS) 2 mg dispersible tablet, Take 1 mg by mouth 3 (three) times a day , Disp: , Rfl:   •  Sunscreens (CHAPSTICK SUNBLOCK 15 EX), Apply topically if needed, Disp: , Rfl:   •  Sunscreens (NEUTROGENA SENSITIVE SKIN 60+ EX), Apply topically if needed, Disp: , Rfl:   •  fluticasone (FLONASE) 50 mcg/act nasal spray, 1 spray into each nostril daily  (Patient not taking: Reported on 2/14/2023), Disp: , Rfl:   •  Multiple Vitamins-Minerals (CERTAVITE/ANTIOXIDANTS PO), Take 1 tablet by mouth daily (Patient not taking: Reported on 2/14/2023), Disp: , Rfl:   •  sodium bicarbonate 650 mg tablet, Take 1 tablet (650 mg total) by mouth daily (Patient not taking: Reported on 12/29/2022), Disp: 90 tablet, Rfl: 3  •  traZODone (DESYREL) 100 mg tablet, Take 100 mg by mouth daily at bedtime (Patient not taking: Reported on 2/14/2023), Disp: , Rfl:     Lab Results:   Results for orders placed or performed in visit on 74/48/66   Basic metabolic panel   Result Value Ref Range    Glucose, Random 136 (H) 65 - 99 mg/dL    BUN 17 7 - 25 mg/dL    Creatinine 1 19 (H) 0 50 - 0 99 mg/dL    eGFR 56 (L) > OR = 60 mL/min/1 73m2    SL AMB BUN/CREATININE RATIO 14 6 - 22 (calc)    Sodium 140 135 - 146 mmol/L    Potassium 3 8 3 5 - 5 3 mmol/L    Chloride 107 98 - 110 mmol/L    CO2 24 20 - 32 mmol/L    Calcium 9 8 8 6 - 10 2 mg/dL

## 2023-02-21 ENCOUNTER — OFFICE VISIT (OUTPATIENT)
Dept: NEUROLOGY | Facility: CLINIC | Age: 48
End: 2023-02-21

## 2023-02-21 VITALS
DIASTOLIC BLOOD PRESSURE: 82 MMHG | OXYGEN SATURATION: 98 % | HEART RATE: 100 BPM | WEIGHT: 156 LBS | TEMPERATURE: 97.7 F | HEIGHT: 64 IN | BODY MASS INDEX: 26.63 KG/M2 | SYSTOLIC BLOOD PRESSURE: 124 MMHG

## 2023-02-21 DIAGNOSIS — N18.31 STAGE 3A CHRONIC KIDNEY DISEASE (HCC): ICD-10-CM

## 2023-02-21 DIAGNOSIS — G40.209 PARTIAL SYMPTOMATIC EPILEPSY WITH COMPLEX PARTIAL SEIZURES, NOT INTRACTABLE, WITHOUT STATUS EPILEPTICUS (HCC): Primary | ICD-10-CM

## 2023-02-21 DIAGNOSIS — F84.9 PDD (PERVASIVE DEVELOPMENTAL DISORDER): ICD-10-CM

## 2023-02-21 NOTE — PROGRESS NOTES
[facility]rology [address] [facility]  Follow Up Visit    Impression/Plan    Ms Erick Cooks is a 50 y o  female with epilepsy, likely focal due to childhood injury, but most recent EEG (slowing) and MRI unrevealing  Her history includes PDD and MR  Seizures had been well controlled, with seizure freedom for greater than 7 years  Pietro Revels was a seizure in February in the setting of significant sleep deprivation  We will consider this a provoked seizure for now  If she has additional events therapy will need to be escalated  She was taken off topiramate in 2020 due to problematic side effects      Patient Instructions   1  Continue current seizure medications unchanged  2  Let us know if there are seizures or problems with your medication  3  Return in 6 months to see [employer], CRNP   Diagnoses and all orders for this visit:    Partial symptomatic epilepsy with complex partial seizures, not intractable, without status epilepticus ([facility] [address] )    PDD (pervasive developmental disorder)    Stage 3a chronic kidney disease ([facility] [address] )        Will Awad is returning to the [facility] for follow up  Interval Events:   Seizures since last visit: yes, provoked (no seizures for about 7 years prior)      She was seen in the emergency department in [facility] on February 8, 2023 for a 2 to 3-minute generalized tonic-clonic seizure witnessed in the car  Postictal afterward  She was sleep deprived at the time  Seizure was considered provoked and no adjustments were made to her medications  Labs 2/8/2023:  Topiramate undetectable  Carbamazepine 7 2  Lithium 0 6  Creatinine 1 23  UA unremarkable  CBC unremarkable    Topiramate was stopped in early 2021 after gradual wean  It was thought to be contributing to metabolic acidosis        Current AEDs:  Carbamazepine 400 mg twice daily  Medication side effects: None  Medication adherence: Yes    Event/Seizure semiology:  Violent shaking with foaming at the mouth that can be preceded by a loud scream     Special Features  Status epilepticus:  Not known  Self Injury Seizures:  None known  Precipitating Factors: Stress     Epilepsy Risk Factors:  Intellectual disability  Head injury (moderate/severe)     Prior AEDs:  Topiramate stopped 2020 in setting of RTA     Prior Evaluation:  MRI in 2008 was read as normal  Dr Braxton Neither last note indicates that her last EEG was unrevealing for source and no focal discharges noted  There was generalized encephalopathy      Prior MRI brain or EEG reports not available in [facility] or Care Everywhere for review        Psychiatric History:  Followed by psychiatry, multiple psychiatric medications  Behavioral outbursts       Social History:   Driving: No  Lives Alone: No  Occupation: on permanent disability      Objective    /82 (BP Location: Left arm, Patient Position: Sitting, Cuff Size: Standard)   Pulse 100   Temp 97 7 °F (36 5 °C) (Temporal)   Ht 5' 4" (1 626 m)   Wt 70 8 kg (156 lb)   SpO2 98%   BMI 26 78 kg/m²      General Exam  No acute distress  Neurologic Exam  Mental Status:  Alert  Give 5 when prompted  Intermittently stands and approaches me  Language: nonverbal    Cranial Nerves: Face symmetric  Motor:  Moves all 4 ext without evidence of weakness or asymmetry  Coordination: no ataxia when reaching     Gait: wide

## 2023-02-21 NOTE — PATIENT INSTRUCTIONS
Continue current seizure medications unchanged  Let us know if there are seizures or problems with your medication  Return in 6 months to see Olena Randall

## 2023-02-26 LAB — POTASSIUM SERPL-SCNC: 3.8 MMOL/L (ref 3.5–5.3)

## 2023-06-19 DIAGNOSIS — G40.209 PARTIAL SYMPTOMATIC EPILEPSY WITH COMPLEX PARTIAL SEIZURES, NOT INTRACTABLE, WITHOUT STATUS EPILEPTICUS (HCC): ICD-10-CM

## 2023-06-20 RX ORDER — CARBAMAZEPINE 200 MG/1
TABLET, EXTENDED RELEASE ORAL
Qty: 360 TABLET | Refills: 3 | Status: SHIPPED | OUTPATIENT
Start: 2023-06-20

## 2023-07-06 ENCOUNTER — TELEPHONE (OUTPATIENT)
Dept: OBGYN CLINIC | Facility: CLINIC | Age: 48
End: 2023-07-06

## 2023-07-06 DIAGNOSIS — Z30.42 ENCOUNTER FOR SURVEILLANCE OF INJECTABLE CONTRACEPTIVE: Primary | ICD-10-CM

## 2023-07-06 RX ORDER — MEDROXYPROGESTERONE ACETATE 150 MG/ML
150 INJECTION, SUSPENSION INTRAMUSCULAR
Qty: 1 ML | Refills: 0 | Status: SHIPPED | OUTPATIENT
Start: 2023-07-06

## 2023-07-06 RX ORDER — MEDROXYPROGESTERONE ACETATE 150 MG/ML
150 INJECTION, SUSPENSION INTRAMUSCULAR ONCE
Status: CANCELLED | OUTPATIENT
Start: 2023-07-06 | End: 2023-07-06

## 2023-07-06 NOTE — TELEPHONE ENCOUNTER
medroxyPROGESTERone (DEPO-PROVERA) 150 mg/mL injection [[id number]]     Order Details  Dose: 150 mg Route: Intramuscular Frequency: Every 3 months   Dispense Quantity: 1 mL Refills: 3    Call yesterday for a refill need by Friday 7/7/2023

## 2023-07-06 NOTE — TELEPHONE ENCOUNTER
Order was placed around please delete the order placed for this encounter I did send 1 refill to the patient pharmacy patient is due for annual exam please give her appointment for annual exam

## 2023-07-07 ENCOUNTER — CLINICAL SUPPORT (OUTPATIENT)
Dept: OBGYN CLINIC | Facility: CLINIC | Age: 48
End: 2023-07-07
Payer: MEDICARE

## 2023-07-07 VITALS
HEIGHT: 64 IN | WEIGHT: 156.2 LBS | SYSTOLIC BLOOD PRESSURE: 122 MMHG | DIASTOLIC BLOOD PRESSURE: 80 MMHG | BODY MASS INDEX: 26.67 KG/M2

## 2023-07-07 DIAGNOSIS — Z30.42 ENCOUNTER FOR SURVEILLANCE OF INJECTABLE CONTRACEPTIVE: Primary | ICD-10-CM

## 2023-07-07 PROCEDURE — 96372 THER/PROPH/DIAG INJ SC/IM: CPT

## 2023-07-07 RX ORDER — MEDROXYPROGESTERONE ACETATE 150 MG/ML
INJECTION, SUSPENSION INTRAMUSCULAR
COMMUNITY
Start: 2023-04-11

## 2023-07-07 RX ORDER — MEDROXYPROGESTERONE ACETATE 150 MG/ML
150 INJECTION, SUSPENSION INTRAMUSCULAR ONCE
Status: COMPLETED | OUTPATIENT
Start: 2023-07-07 | End: 2023-07-07

## 2023-07-07 RX ORDER — CHLORPROMAZINE HYDROCHLORIDE 100 MG/1
TABLET, FILM COATED ORAL
COMMUNITY
Start: 2023-07-05

## 2023-07-07 RX ORDER — FOLIC ACID/MV,IRON,MIN/LUTEIN 0.4-18-25
TABLET ORAL
COMMUNITY
Start: 2023-06-08

## 2023-07-07 RX ORDER — CLONAZEPAM 2 MG/1
TABLET ORAL
COMMUNITY
Start: 2023-06-14

## 2023-07-07 RX ORDER — BUSPIRONE HYDROCHLORIDE 30 MG/1
TABLET ORAL
COMMUNITY
Start: 2023-07-05

## 2023-07-07 RX ORDER — RISPERIDONE 2 MG/1
TABLET ORAL
COMMUNITY
Start: 2023-06-16

## 2023-07-07 RX ADMIN — MEDROXYPROGESTERONE ACETATE 150 MG: 150 INJECTION, SUSPENSION INTRAMUSCULAR at 10:32

## 2023-07-22 LAB
ALBUMIN/CREAT UR: NORMAL MCG/MG CREAT
BASOPHILS # BLD AUTO: 31 CELLS/UL (ref 0–200)
BASOPHILS NFR BLD AUTO: 0.8 %
BUN SERPL-MCNC: 18 MG/DL (ref 7–25)
BUN/CREAT SERPL: 15 (CALC) (ref 6–22)
CALCIUM SERPL-MCNC: 9.8 MG/DL (ref 8.6–10.4)
CALCIUM SERPL-MCNC: 9.8 MG/DL (ref 8.6–10.4)
CHLORIDE SERPL-SCNC: 106 MMOL/L (ref 98–110)
CO2 SERPL-SCNC: 26 MMOL/L (ref 20–32)
CREAT SERPL-MCNC: 1.22 MG/DL (ref 0.5–1.03)
CREAT UR-MCNC: 30 MG/DL (ref 20–275)
EOSINOPHIL # BLD AUTO: 140 CELLS/UL (ref 15–500)
EOSINOPHIL NFR BLD AUTO: 3.6 %
ERYTHROCYTE [DISTWIDTH] IN BLOOD BY AUTOMATED COUNT: 12.1 % (ref 11–15)
GFR/BSA.PRED SERPLBLD CYS-BASED-ARV: 54 ML/MIN/1.73M2
GLUCOSE SERPL-MCNC: 88 MG/DL (ref 65–99)
HCT VFR BLD AUTO: 40.3 % (ref 35–45)
HGB BLD-MCNC: 13.6 G/DL (ref 11.7–15.5)
LYMPHOCYTES # BLD AUTO: 1303 CELLS/UL (ref 850–3900)
LYMPHOCYTES NFR BLD AUTO: 33.4 %
MAGNESIUM SERPL-MCNC: 2.1 MG/DL (ref 1.5–2.5)
MCH RBC QN AUTO: 30.6 PG (ref 27–33)
MCHC RBC AUTO-ENTMCNC: 33.7 G/DL (ref 32–36)
MCV RBC AUTO: 90.8 FL (ref 80–100)
MICROALBUMIN UR-MCNC: <0.2 MG/DL
MONOCYTES # BLD AUTO: 355 CELLS/UL (ref 200–950)
MONOCYTES NFR BLD AUTO: 9.1 %
NEUTROPHILS # BLD AUTO: 2071 CELLS/UL (ref 1500–7800)
NEUTROPHILS NFR BLD AUTO: 53.1 %
PHOSPHATE SERPL-MCNC: 3.7 MG/DL (ref 2.5–4.5)
PLATELET # BLD AUTO: 347 THOUSAND/UL (ref 140–400)
PMV BLD REES-ECKER: 9.8 FL (ref 7.5–12.5)
POTASSIUM SERPL-SCNC: 4 MMOL/L (ref 3.5–5.3)
PTH-INTACT SERPL-MCNC: 26 PG/ML (ref 16–77)
RBC # BLD AUTO: 4.44 MILLION/UL (ref 3.8–5.1)
SODIUM SERPL-SCNC: 139 MMOL/L (ref 135–146)
WBC # BLD AUTO: 3.9 THOUSAND/UL (ref 3.8–10.8)

## 2023-08-20 ENCOUNTER — OFFICE VISIT (OUTPATIENT)
Dept: URGENT CARE | Age: 48
End: 2023-08-20
Payer: COMMERCIAL

## 2023-08-20 VITALS — OXYGEN SATURATION: 99 % | TEMPERATURE: 97.6 F | RESPIRATION RATE: 20 BRPM | HEART RATE: 78 BPM

## 2023-08-20 DIAGNOSIS — Z20.822 CLOSE EXPOSURE TO COVID-19 VIRUS: Primary | ICD-10-CM

## 2023-08-20 LAB
SARS-COV-2 AG UPPER RESP QL IA: NEGATIVE
VALID CONTROL: NORMAL

## 2023-08-20 PROCEDURE — 87811 SARS-COV-2 COVID19 W/OPTIC: CPT

## 2023-08-20 PROCEDURE — 99213 OFFICE O/P EST LOW 20 MIN: CPT | Performed by: FAMILY MEDICINE

## 2023-08-20 NOTE — PROGRESS NOTES
[facility]    NAME: Luke Munoz is a 46 y.o. female  : 1975    MRN: [id number]  DATE: 2023  TIME: 5:48 PM    Assessment and Plan   Close exposure to COVID-19 virus [Z20.822]  1. Close exposure to COVID-19 virus  Poct Covid 19 Rapid Antigen Test        No symptoms  Did inform the caregiver that test may not be covered by insurance, verbalized agreement  Provided precautionary measures    Patient Instructions   There are no Patient Instructions on file for this visit. Follow up with PCP in 3-5 days. Proceed to ER if symptoms worsen. Chief Complaint     Chief Complaint   Patient presents with   • COVID-19 Exposure     Patient exposed to covid about 3-4 days ago. Denies any symptoms     History of Present Illness   URI   This is a new problem. Pertinent negatives include no chest pain, congestion, ear pain, headaches, rhinorrhea, sinus pain, sneezing, sore throat or wheezing. Associated symptoms comments: No shortness of breath. Review of Systems   Review of Systems   HENT: Negative for congestion, ear pain, rhinorrhea, sinus pain, sneezing and sore throat. Respiratory: Negative for wheezing. Cardiovascular: Negative for chest pain. Neurological: Negative for headaches. All other systems reviewed and are negative. The following portions of the patient's history were reviewed and updated as appropriate: allergies, current medications, past family history, past medical history, past social history, past surgical history and problem list.     Medications have been verified. Objective   Pulse 78   Temp 97.6 °F (36.4 °C)   Resp 20   SpO2 99%     Physical Exam  Constitutional:       General: She is not in acute distress. Appearance: Normal appearance. She is not ill-appearing. HENT:      Nose: Nose normal.      Mouth/Throat:      Mouth: Mucous membranes are moist.   Eyes:      Pupils: Pupils are equal, round, and reactive to light.    Pulmonary:      Effort: Pulmonary effort is normal. No respiratory distress. Breath sounds: No wheezing, rhonchi or rales. Lymphadenopathy:      Cervical: No cervical adenopathy. Neurological:      Mental Status: She is alert.        Hayden Bhandari MD

## 2023-08-22 ENCOUNTER — OFFICE VISIT (OUTPATIENT)
Dept: NEPHROLOGY | Facility: CLINIC | Age: 48
End: 2023-08-22
Payer: COMMERCIAL

## 2023-08-22 ENCOUNTER — OFFICE VISIT (OUTPATIENT)
Dept: NEUROLOGY | Facility: CLINIC | Age: 48
End: 2023-08-22

## 2023-08-22 VITALS
DIASTOLIC BLOOD PRESSURE: 78 MMHG | WEIGHT: 156 LBS | HEIGHT: 64 IN | SYSTOLIC BLOOD PRESSURE: 124 MMHG | BODY MASS INDEX: 26.63 KG/M2

## 2023-08-22 VITALS
HEIGHT: 64 IN | DIASTOLIC BLOOD PRESSURE: 78 MMHG | WEIGHT: 158.1 LBS | SYSTOLIC BLOOD PRESSURE: 124 MMHG | OXYGEN SATURATION: 99 % | HEART RATE: 73 BPM | BODY MASS INDEX: 26.99 KG/M2 | TEMPERATURE: 98.2 F

## 2023-08-22 DIAGNOSIS — N18.30 BENIGN HYPERTENSION WITH CKD (CHRONIC KIDNEY DISEASE) STAGE III (HCC): Primary | ICD-10-CM

## 2023-08-22 DIAGNOSIS — G40.209 PARTIAL SYMPTOMATIC EPILEPSY WITH COMPLEX PARTIAL SEIZURES, NOT INTRACTABLE, WITHOUT STATUS EPILEPTICUS (HCC): Primary | ICD-10-CM

## 2023-08-22 DIAGNOSIS — N18.31 STAGE 3A CHRONIC KIDNEY DISEASE (HCC): ICD-10-CM

## 2023-08-22 DIAGNOSIS — I12.9 BENIGN HYPERTENSION WITH CKD (CHRONIC KIDNEY DISEASE) STAGE III (HCC): Primary | ICD-10-CM

## 2023-08-22 DIAGNOSIS — E87.8 LOW BICARBONATE LEVEL: ICD-10-CM

## 2023-08-22 PROCEDURE — 99214 OFFICE O/P EST MOD 30 MIN: CPT | Performed by: INTERNAL MEDICINE

## 2023-08-22 NOTE — ASSESSMENT & PLAN NOTE
Patient continues to be seizure free since her last visit. Most recent seizure in February in the setting of significant sleep deprivation, considered to be provoked. Since then there have been no further episodes concerning for seizures. She continue on carbamazepine  mg BID without clear side effects. Her most recent sodium level was stable at 139. Last carbamazepine level was 6.6 a little over one year ago. Recommend rechecking in the next few months. Continue current medication unchanged. Follow up in 6 months or sooner if needed.

## 2023-08-22 NOTE — PATIENT INSTRUCTIONS
- Continue carbamazpine 400 mg twice per day  - Call the office with any seizures or concerns  - Reviewed most recent metabolic panel- sodium is stable  - Have carbamazepine level checked with next blood work - not urgent but can have this done when she is due for next blood work with PCP or other specialist   - Follow up in 6 months or sooner if needed

## 2023-08-22 NOTE — PROGRESS NOTES
Patient ID: Riki Rasheed is a 46 y.o. female with epilepsy, likely focal due to childhood injury, but most recent EEG (slowing) and MRI unrevealing, who is returning to Neurology office for follow up of her seizures. Assessment/Plan:    Partial symptomatic epilepsy [facility])  Patient continues to be seizure free since her last visit. Most recent seizure in February in the setting of significant sleep deprivation, considered to be provoked. Since then there have been no further episodes concerning for seizures. She continue on carbamazepine  mg BID without clear side effects. Her most recent sodium level was stable at 139. Last carbamazepine level was 6.6 a little over one year ago. Recommend rechecking in the next few months. Continue current medication unchanged. Follow up in 6 months or sooner if needed. She will Return in about 6 months (around 2/22/2024) for Follow up with Dr Samuel Ortiz or Richie Urbano. Subjective:  Riki Rasheed is a 46 y.o. female with epilepsy, likely focal due to childhood injury, but most recent EEG (slowing) and MRI unrevealing, who is returning to Neurology office for follow up of her seizures. She was last seen in the office on 2/21/23 by Dr Samuel Ortiz. At that time, seizures were well controlled for over 7 years but there was a seizure in February in the setting of significant sleep deprivation so considered a provoked event. Noted if events were to continue, therapy would need to be escalated. Since their last visit, she has continued to be seizure free. Doing well on her medications. Mood and behaviors have been at baseline. No issues or concerns. She has been sleeping well and eating well. Current seizure medications:  - carbamazepine  mg BID  Other medications as per Epic.      Latest Reference Range & Units 03/02/20 07:41 03/09/21 07:22 06/07/22 07:38 07/13/22 07:41   CARBAMAZEPINE LEVEL 4.0 - 12.0 mg/L  7.3 7.1 7.0 6.6      Latest Reference Range & Units 07/21/23 07:10   Sodium 135 - 146 mmol/L 139     Event/Seizure semiology:  Violent shaking with foaming at the mouth that can be preceded by a loud scream     Special Features  Status epilepticus:  Not known  Self Injury Seizures:  None known  Precipitating Factors: Stress     Epilepsy Risk Factors:  Intellectual disability  Head injury (moderate/severe)     Prior AEDs:  Topiramate stopped 2020 in setting of RTA     Prior Evaluation:  MRI in 2008 was read as normal. Dr. Mee Bella last note indicates that her last EEG was unrevealing for source and no focal discharges noted. There was generalized encephalopathy.     Prior MRI brain or EEG reports not available in [employer] or [employer] Everywhere for review.       Psychiatric History:  Followed by psychiatry, multiple psychiatric medications. Behavioral outbursts.      Social History:   Driving: No  Lives Alone: No  Occupation: on permanent disability    Her history was also obtained from her caretaker, who was present at today's visit. I reviewed prior neurology notes, most recent labs, as documented in [facility], and summarized above. Objective:    Blood pressure 124/78, pulse 73, temperature 98.2 °F (36.8 °C), temperature source Temporal, height 5' 4" (1.626 m), weight 71.7 kg (158 lb 1.6 oz), SpO2 99 %, not currently breastfeeding. Physical Exam  No apparent distress. Appears well nourished.   Mood appropriate for situation.     Neurologic Exam - limited due to cooperation  Mental status- alert. Non verbal. Follows some simple commands (high five,  your feet, lift up your arms, smile)     Cranial Nerves- PERRL, tracks examiner in room, blinks to visual threat, hearing grossly intact to speech,  facial muscles symmetric     Motor- Appropriate strength. Moves all extremities freely. No tremor.     Sensory-  Withdraws all extremities to light touch.      DTRs- 2+ and symmetric in all extremities.      Gait- wide based casual gait.    Coordination- unable to participate in FNF, high fives without issues with both hands. ROS:  Review of Systems   Constitutional: Negative. HENT: Negative. Eyes: Negative. Respiratory: Negative. Cardiovascular: Negative. Gastrointestinal: Negative. Endocrine: Negative. Genitourinary: Negative. Musculoskeletal: Negative. Skin: Negative. Allergic/Immunologic: Negative. Hematological: Negative. Psychiatric/Behavioral: Negative. All other systems reviewed and are negative. ROS obtained by MA and reviewed by myself. This note may have been created using voice recognition software. There may be unintentional errors such as grammatical errors, spelling errors, or pronoun errors.

## 2023-08-22 NOTE — PROGRESS NOTES
NEPHROLOGY OUTPATIENT PROGRESS NOTE   Deidra Carvalho 46 y.o. female MRN: [id number]  DATE: 8/22/2023  Reason for visit:   Chief Complaint   Patient presents with   • Follow-up   • Chronic Kidney Disease     ASSESSMENT and PLAN:  Chronic kidney disease stage III (baseline serum creatinine 1.3, previous baseline used to be 1.0)  -Last serum creatinine 1.2 in July 2023 overall stable. -CKD suspect secondary to long-term hypertension, chronic long-term lithium related nephrotoxicity (on lithium >13 years which can cause chronic interstitial nephritis )  - We'll check BMP before next visit.  -UA bland without hematuria or proteinuria in February 2023. UACR nonsignificant in July 2023.   -patient is unable to be weaned off lithium due to her multiple significant psych issues as per my prior discussion with psychiatrist. (psychiatrist Dr. Kieran Nova Ph - [phone])  -renal ultrasound in February 2019 shows right kidney 10.2 cm, left kidney 9.9 cm, echogenic appearance of right kidney, no hydronephrosis, echogenic appearance in the left kidney     Serum sodium level overall stable at 139  -she is currently not on any fluid restriction.   -prior 24 hour UO 2.8 L. Difficult to obtain another 24 hr UO and remains very challenging due to psych issues and unable to comply with proper collection to determine if she truly has polyuria.   -prior urine osmolality 239 in February 2019.  -UA in February 2023 shows diluted urine with SG 1.003  -patient is overall stable on multiple psych medications including lithium.  If has worsening hypernatremia or polyuria issues, may need to consider Amiloride.     Hypokalemia, resolved, serum potassium 4.0.  -Magnesium level within normal range     Concern for medullary nephrocalcinosis on prior renal ultrasound  -no obvious recent episodes of kidney stone flare-up.     Hypertension  -due to patient's developmental disorder, patient does not sit still to be able to check blood pressure accurately.  she remains agitated throughout the entire office visit  -BP acceptable. -Currently remains on metoprolol.     Mild proteinuria, overall improved, last UACR unremarkable as above.      Low bicarbonate, resolved. continued to remain off Topamax since early 2021.  -Continue to remain off sodium bicarb supplements. Last bicarb level 26. Diagnoses and all orders for this visit:    Benign hypertension with CKD (chronic kidney disease) stage III ([address])  -     Basic metabolic panel; Future  -     CBC; Future  -     PTH, intact; Future  -     Albumin / creatinine urine ratio; Future  -     Phosphorus; Future    Stage 3a chronic kidney disease (HCC)  -     Basic metabolic panel; Future  -     CBC; Future  -     PTH, intact; Future  -     Albumin / creatinine urine ratio; Future  -     Phosphorus; Future    Low bicarbonate level  -     Basic metabolic panel; Future          SUBJECTIVE / HPI:  Patient is 78-year-old female with significant past medical history of developmental disorder, mental retardation, occipital injury in the past, history of epilepsy, hypertension for at [address], for regular follow-up of CKD. Previous baseline creatinine 1.0 going back to 2013 although her creatinine has been in the range of 1.2-1.3 since January 2016. Most recent serum creatinine stable at baseline. She lives at group home. continue to remain off Topamax. Abdoulaye Augustin is unable to provide any history due to underlying intellectual disability.  All history is obtained from reviewing medical records and accompanying caregiver.       Overall no recent change in medications as per caregiver. Unable to comment whether patient has any urinary complaint or not. Patient has been on metoprolol for hypertension. She is also on long-term chronic lithium, buspirone, chlorpromazine, and benztropine     REVIEW OF SYSTEMS:  Unable to obtain any review of system due to patient's intellectual disability.     PHYSICAL EXAM:  Vitals: 08/22/23 1157 08/22/23 1217   BP:  124/78   Weight: 70.8 kg (156 lb)    Height: 5' 4" (1.626 m)      Body mass index is 26.78 kg/m². Physical Exam  Vitals reviewed. Constitutional:       Appearance: She is well-developed. HENT:      Head: Normocephalic and atraumatic. Right Ear: External ear normal.      Left Ear: External ear normal.   Eyes:      Conjunctiva/sclera: Conjunctivae normal.   Cardiovascular:      Comments: No significant edema in legs  Pulmonary:      Effort: Pulmonary effort is normal.      Breath sounds: Normal breath sounds. No wheezing or rales. Abdominal:      General: Bowel sounds are normal. There is no distension. Palpations: Abdomen is soft. Tenderness: There is no abdominal tenderness. Musculoskeletal:         General: No deformity. Lymphadenopathy:      Cervical: No cervical adenopathy. Skin:     Findings: No rash. Neurological:      General: No focal deficit present. Mental Status: She is alert. Comments: Active, alert   Psychiatric:      Comments: Underlying intellectual disability, does not answer any questions. PAST MEDICAL HISTORY:  Past Medical History:   Diagnosis Date   • Brain injury [facility])    • Encephalopathy    • Hypertension    • Seizures ([address])        PAST SURGICAL HISTORY:  History reviewed. No pertinent surgical history.     SOCIAL HISTORY:  Social History     Substance and Sexual Activity   Alcohol Use No     Social History     Substance and Sexual Activity   Drug Use No     Social History     Tobacco Use   Smoking Status Never   Smokeless Tobacco Never       FAMILY HISTORY:  Family History   Problem Relation Age of Onset   • No Known Problems Mother    • No Known Problems Father        MEDICATIONS:    Current Outpatient Medications:   •  acetaminophen (TYLENOL) 500 mg tablet, Take 500 mg by mouth every 4 (four) hours as needed for mild pain, Disp: , Rfl:   •  ammonium lactate (LAC-HYDRIN) 12 % cream, Apply 1 application topically 2 (two) times a day, Disp: , Rfl:   •  B Complex Vitamins (VITAMIN B COMPLEX PO), Take 1 tablet by mouth 2 (two) times a day, Disp: , Rfl:   •  benztropine (COGENTIN) 1 mg tablet, Take 1 mg by mouth 2 (two) times a day, Disp: , Rfl:   •  betamethasone dipropionate (DIPROSONE) 0.05 % cream, Apply 1 application topically 2 (two) times a day, Disp: , Rfl:   •  busPIRone (BUSPAR) 30 MG tablet, , Disp: , Rfl:   •  Calcium Carb-Cholecalciferol (OYSCO 500 + D) 500 mg-200 units per tablet, 2 (two) times a day, Disp: , Rfl:   •  carBAMazepine (TEGretol XR) 200 mg 12 hr tablet, TAKE TWO TABLETS (400MG) BY MOUTH TWICE DAILY AT 8AM AND 2PM FOR SEIZURES/MOOD, Disp: 360 tablet, Rfl: 3  •  chlorproMAZINE (THORAZINE) 50 mg tablet, Take 50 mg by mouth 3 (three) times a day  , Disp: , Rfl:   •  clonazePAM (KlonoPIN) 0.5 mg tablet, Take 0.5 mg by mouth Takes . 5 mg tablet at 8 am and 2 pm and tane 2 mg tablets at 8 pm, Disp: , Rfl:   •  Cold Sore Products (HERPECIN-L EX), Apply topically if needed, Disp: , Rfl:   •  diazepam (VALIUM) 2 mg tablet, Take 2 mg by mouth every 8 (eight) hours as needed for anxiety, Disp: , Rfl:   •  fexofenadine (ALLEGRA) 180 MG tablet, Take 180 mg by mouth daily, Disp: , Rfl:   •  fluticasone (FLONASE) 50 mcg/act nasal spray, 1 spray into each nostril daily, Disp: , Rfl:   •  hydrOXYzine HCL (ATARAX) 25 mg tablet, Take 25 mg by mouth 3 (three) times a day, Disp: , Rfl:   •  lithium carbonate 300 mg capsule, Take 300 mg by mouth 2 (two) times a day with meals, Disp: , Rfl:   •  medroxyPROGESTERone (DEPO-PROVERA) 150 mg/mL injection, Inject 1 mL (150 mg total) into a muscle every 3 (three) months, Disp: 1 mL, Rfl: 3  •  metoprolol tartrate (LOPRESSOR) 50 mg tablet, Take 50 mg by mouth 2 (two) times a day, Disp: , Rfl:   •  Misc.  Devices ([place]te Bite Block) MISC, Use 2 (two) times a day, Disp: , Rfl:   •  montelukast (SINGULAIR) 10 mg tablet, Take 10 mg by mouth daily at bedtime, Disp: , Rfl:   • Multiple Vitamins-Minerals (CertaVite/Antioxidants) TABS, , Disp: , Rfl:   •  neomycin-bacitracin-polymyxin b (NEOSPORIN) ointment, Apply 1 application topically if needed, Disp: , Rfl:   •  ofloxacin (OCUFLOX) 0.3 % ophthalmic solution, 1 drop if needed, Disp: , Rfl:   •  olopatadine HCl (PATADAY) 0.2 % opth drops, Administer 1 drop to both eyes daily, Disp: , Rfl:   •  potassium chloride (K-DUR,KLOR-CON) 20 mEq tablet, Take 1 tablet (20 mEq total) by mouth daily, Disp: 5 tablet, Rfl: 0  •  risperiDONE (RisperDAL M-TABS) 2 mg dispersible tablet, Take 1 mg by mouth 3 (three) times a day , Disp: , Rfl:   •  Sunscreens (CHAPSTICK SUNBLOCK 15 EX), Apply topically if needed, Disp: , Rfl:   •  Sunscreens ([facility] EX), Apply topically if needed, Disp: , Rfl:   •  busPIRone (BUSPAR) 15 mg tablet, Take 30 mg by mouth 2 (two) times a day  (Patient not taking: Reported on 8/22/2023), Disp: , Rfl:   •  chlorhexidine (PERIDEX) 0.12 % solution, Apply 15 mL to the mouth or throat 2 (two) times a day (Patient not taking: Reported on 8/22/2023), Disp: , Rfl:   •  chlorproMAZINE (THORAZINE) 100 mg tablet, , Disp: , Rfl:   •  clonazePAM (KlonoPIN) 2 mg tablet, , Disp: , Rfl:   •  medroxyPROGESTERone (DEPO-PROVERA) 150 mg/mL injection, Inject 1 mL (150 mg total) into a muscle every 3 (three) months, Disp: 1 mL, Rfl: 0  •  medroxyPROGESTERone acetate (DEPO-PROVERA SYRINGE) 150 mg/mL injection, , Disp: , Rfl:   •  Multiple Vitamins-Minerals (CERTAVITE/ANTIOXIDANTS PO), Take 1 tablet by mouth daily (Patient not taking: Reported on 8/22/2023), Disp: , Rfl:   •  Psyllium (METAMUCIL) WAFR, Take 1 Wafer by mouth daily (Patient not taking: Reported on 8/22/2023), Disp: , Rfl:   •  risperiDONE (RisperDAL) 2 mg tablet, , Disp: , Rfl:   •  traZODone (DESYREL) 100 mg tablet, Take 100 mg by mouth daily at bedtime (Patient not taking: Reported on 2/14/2023), Disp: , Rfl:     Lab Results:   Creatinine 1.2 in July 2023

## 2023-09-01 ENCOUNTER — TELEPHONE (OUTPATIENT)
Dept: NEUROLOGY | Facility: CLINIC | Age: 48
End: 2023-09-01

## 2023-09-01 ENCOUNTER — HOSPITAL ENCOUNTER (INPATIENT)
Facility: HOSPITAL | Age: 48
LOS: 1 days | Discharge: HOME | DRG: 101 | End: 2023-09-02
Attending: EMERGENCY MEDICINE | Admitting: INTERNAL MEDICINE
Payer: MEDICARE

## 2023-09-01 DIAGNOSIS — N18.30 BENIGN HYPERTENSION WITH CKD (CHRONIC KIDNEY DISEASE) STAGE III (HCC): ICD-10-CM

## 2023-09-01 DIAGNOSIS — I12.9 BENIGN HYPERTENSION WITH CKD (CHRONIC KIDNEY DISEASE) STAGE III (HCC): ICD-10-CM

## 2023-09-01 DIAGNOSIS — N18.31 STAGE 3A CHRONIC KIDNEY DISEASE (HCC): ICD-10-CM

## 2023-09-01 DIAGNOSIS — G40.909 EPILEPSY (HCC): Primary | ICD-10-CM

## 2023-09-01 PROBLEM — R13.10 DYSPHAGIA: Status: ACTIVE | Noted: 2023-09-01

## 2023-09-01 PROBLEM — R56.9 SEIZURES (HCC): Status: ACTIVE | Noted: 2023-09-01

## 2023-09-01 PROBLEM — Z78.9 FULL CODE STATUS: Status: ACTIVE | Noted: 2023-09-01

## 2023-09-01 PROBLEM — F89 DEVELOPMENTAL DISORDER: Status: ACTIVE | Noted: 2023-09-01

## 2023-09-01 PROBLEM — I10 HYPERTENSION: Status: ACTIVE | Noted: 2023-09-01

## 2023-09-01 LAB
ALBUMIN SERPL BCP-MCNC: 4 G/DL (ref 3.5–5)
ALP SERPL-CCNC: 64 U/L (ref 34–104)
ALT SERPL W P-5'-P-CCNC: 32 U/L (ref 7–52)
ANION GAP SERPL CALCULATED.3IONS-SCNC: 6 MMOL/L
AST SERPL W P-5'-P-CCNC: 35 U/L (ref 13–39)
BASOPHILS # BLD AUTO: 0.04 THOUSANDS/ÂΜL (ref 0–0.1)
BASOPHILS NFR BLD AUTO: 1 % (ref 0–1)
BILIRUB DIRECT SERPL-MCNC: 0.13 MG/DL (ref 0–0.2)
BILIRUB SERPL-MCNC: 0.3 MG/DL (ref 0.2–1)
BUN SERPL-MCNC: 16 MG/DL (ref 5–25)
CALCIUM SERPL-MCNC: 9.7 MG/DL (ref 8.4–10.2)
CARBAMAZEPINE SERPL-MCNC: 7.6 UG/ML (ref 4–12)
CHLORIDE SERPL-SCNC: 106 MMOL/L (ref 96–108)
CO2 SERPL-SCNC: 25 MMOL/L (ref 21–32)
CREAT SERPL-MCNC: 1.13 MG/DL (ref 0.6–1.3)
EOSINOPHIL # BLD AUTO: 0.19 THOUSAND/ÂΜL (ref 0–0.61)
EOSINOPHIL NFR BLD AUTO: 4 % (ref 0–6)
ERYTHROCYTE [DISTWIDTH] IN BLOOD BY AUTOMATED COUNT: 12.4 % (ref 11.6–15.1)
FLUAV RNA RESP QL NAA+PROBE: NEGATIVE
FLUBV RNA RESP QL NAA+PROBE: NEGATIVE
GFR SERPL CREATININE-BSD FRML MDRD: 56 ML/MIN/1.73SQ M
GLUCOSE SERPL-MCNC: 110 MG/DL (ref 65–140)
HCT VFR BLD AUTO: 41.8 % (ref 34.8–46.1)
HGB BLD-MCNC: 13.3 G/DL (ref 11.5–15.4)
IMM GRANULOCYTES # BLD AUTO: 0.01 THOUSAND/UL (ref 0–0.2)
IMM GRANULOCYTES NFR BLD AUTO: 0 % (ref 0–2)
LITHIUM SERPL-SCNC: 0.7 MMOL/L (ref 0.6–1.2)
LYMPHOCYTES # BLD AUTO: 1.47 THOUSANDS/ÂΜL (ref 0.6–4.47)
LYMPHOCYTES NFR BLD AUTO: 31 % (ref 14–44)
MCH RBC QN AUTO: 31.2 PG (ref 26.8–34.3)
MCHC RBC AUTO-ENTMCNC: 31.8 G/DL (ref 31.4–37.4)
MCV RBC AUTO: 98 FL (ref 82–98)
MONOCYTES # BLD AUTO: 0.56 THOUSAND/ÂΜL (ref 0.17–1.22)
MONOCYTES NFR BLD AUTO: 12 % (ref 4–12)
NEUTROPHILS # BLD AUTO: 2.55 THOUSANDS/ÂΜL (ref 1.85–7.62)
NEUTS SEG NFR BLD AUTO: 52 % (ref 43–75)
NRBC BLD AUTO-RTO: 0 /100 WBCS
PLATELET # BLD AUTO: 278 THOUSANDS/UL (ref 149–390)
PMV BLD AUTO: 10.1 FL (ref 8.9–12.7)
POTASSIUM SERPL-SCNC: 3.5 MMOL/L (ref 3.5–5.3)
PROT SERPL-MCNC: 7.3 G/DL (ref 6.4–8.4)
RBC # BLD AUTO: 4.26 MILLION/UL (ref 3.81–5.12)
RSV RNA RESP QL NAA+PROBE: NEGATIVE
SARS-COV-2 RNA RESP QL NAA+PROBE: NEGATIVE
SODIUM SERPL-SCNC: 137 MMOL/L (ref 135–147)
WBC # BLD AUTO: 4.82 THOUSAND/UL (ref 4.31–10.16)

## 2023-09-01 PROCEDURE — 85025 COMPLETE CBC W/AUTO DIFF WBC: CPT

## 2023-09-01 PROCEDURE — 36415 COLL VENOUS BLD VENIPUNCTURE: CPT

## 2023-09-01 PROCEDURE — 80076 HEPATIC FUNCTION PANEL: CPT

## 2023-09-01 PROCEDURE — 99291 CRITICAL CARE FIRST HOUR: CPT | Performed by: EMERGENCY MEDICINE

## 2023-09-01 PROCEDURE — 80048 BASIC METABOLIC PNL TOTAL CA: CPT

## 2023-09-01 PROCEDURE — 80178 ASSAY OF LITHIUM: CPT

## 2023-09-01 PROCEDURE — 99223 1ST HOSP IP/OBS HIGH 75: CPT | Performed by: INTERNAL MEDICINE

## 2023-09-01 PROCEDURE — 0241U HB NFCT DS VIR RESP RNA 4 TRGT: CPT

## 2023-09-01 PROCEDURE — 99223 1ST HOSP IP/OBS HIGH 75: CPT | Performed by: STUDENT IN AN ORGANIZED HEALTH CARE EDUCATION/TRAINING PROGRAM

## 2023-09-01 PROCEDURE — 99284 EMERGENCY DEPT VISIT MOD MDM: CPT

## 2023-09-01 PROCEDURE — 80156 ASSAY CARBAMAZEPINE TOTAL: CPT

## 2023-09-01 RX ORDER — RISPERIDONE 1 MG/1
1 TABLET, ORALLY DISINTEGRATING ORAL 3 TIMES DAILY
Status: DISCONTINUED | OUTPATIENT
Start: 2023-09-01 | End: 2023-09-02 | Stop reason: HOSPADM

## 2023-09-01 RX ORDER — CARBAMAZEPINE 100 MG/1
400 TABLET, EXTENDED RELEASE ORAL 2 TIMES DAILY
Status: DISCONTINUED | OUTPATIENT
Start: 2023-09-02 | End: 2023-09-02

## 2023-09-01 RX ORDER — CARBAMAZEPINE 100 MG/1
400 TABLET, EXTENDED RELEASE ORAL 2 TIMES DAILY
Status: DISCONTINUED | OUTPATIENT
Start: 2023-09-02 | End: 2023-09-01

## 2023-09-01 RX ORDER — LORAZEPAM 2 MG/ML
1 INJECTION INTRAMUSCULAR EVERY 4 HOURS PRN
Status: DISCONTINUED | OUTPATIENT
Start: 2023-09-01 | End: 2023-09-01

## 2023-09-01 RX ORDER — AMMONIUM LACTATE 12 G/100G
1 CREAM TOPICAL 2 TIMES DAILY
Status: CANCELLED | OUTPATIENT
Start: 2023-09-01

## 2023-09-01 RX ORDER — CLONAZEPAM 1 MG/1
1 TABLET ORAL 3 TIMES DAILY
Status: DISCONTINUED | OUTPATIENT
Start: 2023-09-01 | End: 2023-09-02 | Stop reason: HOSPADM

## 2023-09-01 RX ORDER — LITHIUM CARBONATE 300 MG/1
300 CAPSULE ORAL 2 TIMES DAILY WITH MEALS
Status: DISCONTINUED | OUTPATIENT
Start: 2023-09-01 | End: 2023-09-02 | Stop reason: HOSPADM

## 2023-09-01 RX ORDER — ACETAMINOPHEN 325 MG/1
650 TABLET ORAL EVERY 8 HOURS PRN
Status: DISCONTINUED | OUTPATIENT
Start: 2023-09-01 | End: 2023-09-02 | Stop reason: HOSPADM

## 2023-09-01 RX ORDER — BENZTROPINE MESYLATE 1 MG/1
1 TABLET ORAL 2 TIMES DAILY
Status: DISCONTINUED | OUTPATIENT
Start: 2023-09-01 | End: 2023-09-02 | Stop reason: HOSPADM

## 2023-09-01 RX ORDER — CHLORHEXIDINE GLUCONATE 0.12 MG/ML
15 RINSE ORAL 2 TIMES DAILY
Status: DISCONTINUED | OUTPATIENT
Start: 2023-09-01 | End: 2023-09-02 | Stop reason: HOSPADM

## 2023-09-01 RX ORDER — LORAZEPAM 2 MG/ML
1 INJECTION INTRAMUSCULAR
Status: DISCONTINUED | OUTPATIENT
Start: 2023-09-01 | End: 2023-09-02 | Stop reason: HOSPADM

## 2023-09-01 RX ORDER — DIAZEPAM 2 MG/1
2 TABLET ORAL EVERY 8 HOURS PRN
Status: DISCONTINUED | OUTPATIENT
Start: 2023-09-01 | End: 2023-09-02 | Stop reason: HOSPADM

## 2023-09-01 RX ORDER — MONTELUKAST SODIUM 10 MG/1
10 TABLET ORAL
Status: DISCONTINUED | OUTPATIENT
Start: 2023-09-01 | End: 2023-09-02 | Stop reason: HOSPADM

## 2023-09-01 RX ORDER — HEPARIN SODIUM 5000 [USP'U]/ML
5000 INJECTION, SOLUTION INTRAVENOUS; SUBCUTANEOUS EVERY 8 HOURS SCHEDULED
Status: DISCONTINUED | OUTPATIENT
Start: 2023-09-01 | End: 2023-09-02 | Stop reason: HOSPADM

## 2023-09-01 RX ORDER — CARBAMAZEPINE 100 MG/1
400 TABLET, EXTENDED RELEASE ORAL 2 TIMES DAILY
Status: COMPLETED | OUTPATIENT
Start: 2023-09-01 | End: 2023-09-01

## 2023-09-01 RX ORDER — CHLORPROMAZINE HYDROCHLORIDE 25 MG/1
50 TABLET, FILM COATED ORAL 3 TIMES DAILY
Status: DISCONTINUED | OUTPATIENT
Start: 2023-09-01 | End: 2023-09-02 | Stop reason: HOSPADM

## 2023-09-01 RX ORDER — BUSPIRONE HYDROCHLORIDE 10 MG/1
30 TABLET ORAL 2 TIMES DAILY
Status: DISCONTINUED | OUTPATIENT
Start: 2023-09-01 | End: 2023-09-02 | Stop reason: HOSPADM

## 2023-09-01 RX ORDER — METOPROLOL TARTRATE 50 MG/1
50 TABLET, FILM COATED ORAL 2 TIMES DAILY
Status: DISCONTINUED | OUTPATIENT
Start: 2023-09-01 | End: 2023-09-02 | Stop reason: HOSPADM

## 2023-09-01 RX ORDER — CLONAZEPAM 0.5 MG/1
1 TABLET ORAL ONCE
Status: COMPLETED | OUTPATIENT
Start: 2023-09-01 | End: 2023-09-01

## 2023-09-01 RX ORDER — DEXTROSE AND SODIUM CHLORIDE 5; .45 G/100ML; G/100ML
100 INJECTION, SOLUTION INTRAVENOUS CONTINUOUS
Status: DISPENSED | OUTPATIENT
Start: 2023-09-01 | End: 2023-09-02

## 2023-09-01 RX ORDER — HYDROXYZINE HYDROCHLORIDE 25 MG/1
25 TABLET, FILM COATED ORAL 3 TIMES DAILY
Status: DISCONTINUED | OUTPATIENT
Start: 2023-09-01 | End: 2023-09-02 | Stop reason: HOSPADM

## 2023-09-01 RX ADMIN — BUSPIRONE HYDROCHLORIDE 30 MG: 10 TABLET ORAL at 22:05

## 2023-09-01 RX ADMIN — MONTELUKAST 10 MG: 10 TABLET, FILM COATED ORAL at 22:01

## 2023-09-01 RX ADMIN — CLONAZEPAM 1 MG: 1 TABLET ORAL at 20:09

## 2023-09-01 RX ADMIN — HYDROXYZINE HYDROCHLORIDE 25 MG: 25 TABLET ORAL at 22:01

## 2023-09-01 RX ADMIN — CARBAMAZEPINE 400 MG: 100 TABLET, EXTENDED RELEASE ORAL at 19:46

## 2023-09-01 RX ADMIN — LORAZEPAM 1 MG: 2 INJECTION INTRAMUSCULAR; INTRAVENOUS at 19:38

## 2023-09-01 RX ADMIN — BENZTROPINE MESYLATE 1 MG: 1 TABLET ORAL at 22:03

## 2023-09-01 RX ADMIN — RISPERIDONE 1 MG: 1 TABLET, ORALLY DISINTEGRATING ORAL at 22:06

## 2023-09-01 RX ADMIN — HEPARIN SODIUM 5000 UNITS: 5000 INJECTION INTRAVENOUS; SUBCUTANEOUS at 22:00

## 2023-09-01 RX ADMIN — CHLORPROMAZINE HYDROCHLORIDE 50 MG: 25 TABLET, FILM COATED ORAL at 22:04

## 2023-09-01 RX ADMIN — LITHIUM CARBONATE 300 MG: 300 CAPSULE, GELATIN COATED ORAL at 22:00

## 2023-09-01 RX ADMIN — CLONAZEPAM 1 MG: 0.5 TABLET ORAL at 13:51

## 2023-09-01 RX ADMIN — METOPROLOL TARTRATE 50 MG: 50 TABLET, FILM COATED ORAL at 22:09

## 2023-09-01 RX ADMIN — DEXTROSE AND SODIUM CHLORIDE 100 ML/HR: 5; .45 INJECTION, SOLUTION INTRAVENOUS at 21:59

## 2023-09-01 NOTE — LETTER
Carlos Cagle. Please see attached seizure action plan for Edwin Ferguson. Please let us know if anything further is needed.      Sean Delarosa

## 2023-09-01 NOTE — CONSULTS
Consultation - Neurology   Roxy Jaeger 46 y.o. female MRN: [id number]  Unit/Bed#: ED-04 Encounter: [id number]      Assessment/Plan     No new Assessment & Plan notes have been filed under this hospital service since the last note was generated. Service: Neurology        {Neurology Follow Up:32497}    History of Present Illness     Reason for Consult / Principal Problem: Breakthrough seizures  Hx and PE limited by: Nonverbal, static encephalopathy  HPI: Roxy Jaeger is a 46 y.o. right handed female who presents with breakthrough seizures. Recent breakthrough seizure in February (felt to be provoked in setting of sleep deprivation). Prior to that caregiver thinks it has been years since last seizure. 10:54 Am today first seizure. Twitching, head turned to the R, BL UE tensed up and shaking, panting, eyes rolled back. Witnessed at group home. Lasted max 45sec. No blood in mouth, no incontinence, no foaming at the mouth. After first seizure she was slightly tired but returned to her baseline. Second seizure witnessed here in ED. Prior to seizures today seemed to be in her usual state of health. No missed medication doses (CBZ at 0800). No recent fevers, infections, changes in bowel/bladder. No recent falls or head injuries. No reported recent sleep deprivation. Baseline mental status includes recognizing caregivers, nonverbal (few words) and uses sign language. Currently appears tired to caregiver at bedside. Walks independently, uses restroom independently. Follows w/ Dr. Joel Butler - last seen 8/22/23 and continued on PTA meds w/ no dose adjustments. No new med changes. Current meds clonazepam 0.5mg TID, risperidone 1mg TID, CBZ XR 400mg BID (0800, 1400). Consults    Review of Systems    Historical Information   Past Medical History:   Diagnosis Date   • Brain injury [facility])    • Encephalopathy    • Hypertension    • Seizures ([address])      History reviewed. No pertinent surgical history.   Social History   Social History     Substance and Sexual Activity   Alcohol Use No     Social History     Substance and Sexual Activity   Drug Use No     E-Cigarette/Vaping   • E-Cigarette Use Never User      E-Cigarette/Vaping Substances   • Nicotine No    • THC No    • CBD No      Social History     Tobacco Use   Smoking Status Never   Smokeless Tobacco Never     Family History: {[facility] FAM HISTORY SMARTLIST:[id number]}    Review of previous medical records was *** completed. ***    Meds/Allergies   {[facility] FKWX:[id number]}    Allergies   Allergen Reactions   • Inderal [Propranolol] Shortness Of Breath   • Catapres [Clonidine Hcl]    • Other      Nuts         Objective   Vitals:Blood pressure 138/79, pulse 99, temperature 97.7 °F (36.5 °C), temperature source Oral, resp. rate 20, SpO2 100 %, not currently breastfeeding. ,There is no height or weight on file to calculate BMI. No intake or output data in the 24 hours ending 09/01/23 1432    Invasive Devices: Invasive Devices     Peripheral Intravenous Line  Duration           Peripheral IV 09/01/23 Left Antecubital <1 day                Physical Exam  Neurologic Exam    Neurologic Exam:  MENTAL STATUS: Awake, alert, attending appropriately to examiner and caregiver at bedside. Follows simple midline and appendicular commands. LANGUAGE: Nonverbal w/ frequent vocalizations. CRANIAL NERVES:  CN II: Visual acuity grossly intact. No visual field deficit. PERRLA. CN III, IV, VI: Baseline dysconjugate gaze w/ exotropia. EOM's grossly intact in all directions. CN V: Normal masseter bulk. CN VII: Face movement symmetric. Smile, eyebrow raise, eyelid bury symmetric. Nasolabial folds and palpebral folds symmetric. CN VIII: Hearing grossly intact bilaterally. CN IX-X: Unable to assess. CN XI: Shoulder shrug symmetric. CN XII: Tongue midline. No deviation, atrophy, or fasciculations. MOTOR: Normal muscle bulk. Normal tone. No pronator drift or orbiting.  No tremors or abnormal involuntary movements appreciated. Moving all extremities spontaneously. Holds BL UE antigravity w/o drift. Moves BL LE w/ full strength. SENSORY:  Light touch: Grossly intact and symmetric throughout. REFLEXES:   Biceps Triceps Brachioradialis Patellar Achilles Kennedy Plantar    Right 2+ 2+ 2+ 2+ 2+ Absent Flexor    Left 2+ 2+ 2+ 2+ 2+ Absent Flexor      COORDINATION: Unable to formally assess, but extremity movements do not appear to have any ataxia or dysmetria. GAIT: Deferred.     Lab Results: {[facility] Neurology Labs:92465}  Imaging Studies: {Results Review Statement:79592}  Electrodiagnostics:    Counseling / Coordination of Care  { yvon neuro SHAKIRA statement:66046}

## 2023-09-01 NOTE — TELEPHONE ENCOUNTER
Voicemail received from facility. Patient had another seizure today and was taken to ED for evaluation. Looking to get SAP in place so patient is not required to go to ED with every seizure. Neurology was consulted while in ED. Anything specific on SAP or standard information? Call returned to [address] [facility],[address]. Advised voicemail received       Staff requesting protocol to include when to call 911/when to take patient to ED. Correct to say:  Call 911 for ED evaluation if patient is having difficulty breathing or seizure longer than 5 minutes. Fax: [phone] [facility] also requesting ASAP follow up visit. She was just seen by Richie Urbano on 8/22/2023. Is sooner follow up necessary?

## 2023-09-01 NOTE — CONSULTS
Neurology Consult Note    Name: Tony Heaton   Age & Sex: 46 y.o. female   MRN: [id number]  Unit/Bed#: S -01   Encounter: [id number]  Length of Stay: 0    Assessment plan:    Breakthrough seizures in setting of underlying epilepsy, unprovoked  Assessment & Plan  46 y.o. 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  Prior to seizures today, patient was in her usual state of health. No provoking factors identified (no missed medication doses, no recent fevers, infections, change in bowel/bladder, recent falls or head injuries, recent sleep deprivation). She returned to her baseline in between the 2 episodes and caregiver confidently reports that the patient is back to her baseline during our evaluation later in the afternoon. Patient's baseline seizure frequency is quite low (last provoked seizure in 2/2023 and seizure-free for years prior to that) and no obvious provoking factors identified so far. Semiology today suggests that the patient had a focal onset seizure with secondary generalization, which is overall different from the semiology of her prior seizures as documented in outpatient neurology notes. The breakthrough seizures today may very well represent progression of her epilepsy syndrome. Reviewed prior work-up:  · MR brain scan w/o contrast, Care Everywhere (2014): Unremarkable except limited exam of the pituitary gland. · [address] (2020): Normal  · "Last EEG was unrevealing for source and no focal discharges noted. There was generalized encephalopathy. "-Per neurology note 8/2023. Workup so far 09/01/23:   Patient is afebrile. /79, saturating 100% on room air. BMP unremarkable, CBZ level therapeutic (7.6). Lithium level pending.      Plan:  · Continue home meds unchanged for now  · CBZ XR 400mg BID (0800, 1400)  · CLONAZEPAM 1.0 mg TID  · Risperidone 1mg TID (not prescribed particularly for seizure control per review of outpatient Neuro notes, appears to be for behavioral outbursts per chart review). · Pending LFTs, CBC, UA, flu/RSV/COVID  · Pending lithium level  · Recommend admission under observation for further work-up to rule out any provoking factors. · No need for head imaging. · If no provocation is found, will likely increase CBZ dose to 500mg BID. · Will need follow-up with epilepsy team outpatient in 6 to 8 weeks after discharge. · Seizure precautions  · For generalized convulsions concerning for seizure >2mins: Ativan 0.05mg/kg (max 4 mg) IV, if no response, repeat in 3-5 minutes (max dose 0.1 mg/kg). AED load: Levetiracetam bolus (60 mg/kg, max 4500 mg). Stat BMP. Chago Pineda will need follow up in in 6 weeks with epilepsy Attending/AP. She will not require outpatient neurological testing. Pending for discharge: labwork     Subjective:    Reason for Consult / Principal Problem: Seizure  Hx and PE limited by: Baseline mental status    HPI:  Ms. Chago Pineda is a 46 y.o. 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 Patient is accompanied by her caretaker this afternoon who assists w/ history. She resides in a facility. Follows w/ Dr. Jasiel Tolliver December outpatient; last office visit 8/22/23 and continued on PTA meds w/ no dose adjustments. No new med changes. Current meds (per list from caregiver and prior neuro notes):   CBZ XR 400mg BID (0800, 1400)  CLONAZEPAM 1.0 mg TID  Risperidone 1mg TID (not prescribed particularly for seizure control per review of outpatient Neuro notes, appears to be for behavioral outbursts per chart review).      Facility staff report that the patient's most recent breakthrough seizure in February (felt to be provoked in setting of sleep deprivation). Prior to that caregiver thinks it has been years since last seizure. Semiology of prior seizures per neurology notes: Violent shaking with foaming at the mouth that can be preceded by a loud scream.     Regarding seizures today:   First episode was at 10:54 AM.  Semiology: Head turned to the R, twitching, BL UE tensed up and shaking, panting, eyes rolled back. Witnessed at group home. Lasted max 45sec. No blood in mouth, no incontinence, no foaming at the mouth. After first seizure she was slightly tired but returned to her baseline. Second seizure witnessed here in ED around 1 PM.     Prior to seizures today seemed to be in her usual state of health. No missed medication doses (CBZ at 0800). No recent fevers, infections, changes in bowel/bladder. No recent falls or head injuries. No reported recent sleep deprivation. During the interview/exam, caregiver reports that the patient is currently back to her baseline. Patient does not appear to be in acute discomfort and is interacting with her caregiver in her usual fashion. ROS limited due to mental status. Inpatient consult to Neurology  Consult performed by: Ezio Angulo MD  Consult ordered by: Agatha Clinton MD          Historical Information   Past Medical History:   Diagnosis Date   • Brain injury [facility])    • Encephalopathy    • Hypertension    • Seizures ([address])      History reviewed. No pertinent surgical history.   Social History   Social History     Substance and Sexual Activity   Alcohol Use No     Social History     Substance and Sexual Activity   Drug Use No     E-Cigarette/Vaping   • E-Cigarette Use Never User      E-Cigarette/Vaping Substances   • Nicotine No    • THC No    • CBD No      Social History     Tobacco Use   Smoking Status Never   Smokeless Tobacco Never     Family History:   Family History   Problem Relation Age of Onset   • No Known Problems Mother    • No Known Problems Father      Meds/Allergies   all current active meds have been reviewed  Allergies   Allergen Reactions   • Inderal [Propranolol] Shortness Of Breath   • Trileptal [Oxcarbazepine] Other (See Comments)     Unknown; per care taker from group home    • Catapres [Clonidine Hcl]    • Other      Nuts         Review of previous medical records was completed. Review of Systems- see HPI     Objective:     Patient ID: Magdi Flores is a 46 y.o. female. Vitals:   Vitals:    23 1146 23 1304 23 1502 23 1802   BP: 138/79  147/93 (!) 205/113   BP Location: Right arm  Right arm Right arm   Pulse: 90 99 90    Resp: 20 20 20    Temp: 97.7 °F (36.5 °C)      TempSrc: Oral  Oral    SpO2: 100% 100% 100% 96%      There is no height or weight on file to calculate BMI. No intake or output data in the 24 hours ending 23 1903    Temperature:   Temp (24hrs), Av.7 °F (36.5 °C), Min:97.7 °F (36.5 °C), Max:97.7 °F (36.5 °C)    Temperature: 97.7 °F (36.5 °C)    Invasive Devices: Invasive Devices     Peripheral Intravenous Line  Duration           Peripheral IV 23 Left Antecubital <1 day                Objective   Vitals:Blood pressure 138/79, pulse 99, temperature 97.7 °F (36.5 °C), temperature source Oral, resp. rate 20, SpO2 100 %, not currently breastfeeding. ,There is no height or weight on file to calculate BMI. No intake or output data in the 24 hours ending 23 1432     Invasive Devices: Invasive Devices            Peripheral Intravenous Line  Duration             Peripheral IV 23 Left Antecubital <1 day                    Physical Exam    Vitals reviewed  General: Cooperative, non verbal but communicates via hand gestures and looking towards the examiner and her caretaker. Smiles occasionally. HEENT: Chronic injury in occipital region noted. No oozing/bleeding. Non tender to touch.      Neurologic Exam     Neurologic Exam:  MENTAL STATUS: Awake, alert, attending appropriately to examiner and caregiver at bedside. Follows simple midline and appendicular commands.     LANGUAGE: Nonverbal w/ frequent vocalizations.     CRANIAL NERVES:  CN II: Visual acuity grossly intact. No visual field deficit. PERRLA. CN III, IV, VI: Baseline dysconjugate gaze w/ exotropia. EOM's grossly intact in all directions. CN V: Normal masseter bulk. CN VII: Face movement symmetric. Smile, eyebrow raise, eyelid bury symmetric. Nasolabial folds and palpebral folds symmetric. CN VIII: Hearing grossly intact bilaterally. CN IX-X: Unable to assess. CN XI: Shoulder shrug symmetric. CN XII: Tongue midline. No deviation, atrophy, or fasciculations.     MOTOR: Normal muscle bulk. Normal tone. No pronator drift or orbiting. No tremors or abnormal involuntary movements appreciated. Moving all extremities spontaneously. Holds BL UE antigravity w/o drift. Moves BL LE w/ full strength.      SENSORY:  Light touch: Grossly intact and symmetric throughout.     REFLEXES:    Biceps Triceps Brachioradialis Patellar Achilles Kennedy Plantar     Right 2+ 2+ 2+ 2+ 2+ Absent Flexor     Left 2+ 2+ 2+ 2+ 2+ Absent Flexor        COORDINATION: Unable to formally assess, but extremity movements do not appear to have any ataxia or dysmetria.     GAIT: Deferred. Labs: I have personally reviewed pertinent reports.     Results from last 7 days   Lab Units 09/01/23  1656   WBC Thousand/uL 4.82   HEMOGLOBIN g/dL 13.3   HEMATOCRIT % 41.8   PLATELETS Thousands/uL 278   NEUTROS PCT % 52   MONOS PCT % 12   EOS PCT % 4      Results from last 7 days   Lab Units 09/01/23  1217   POTASSIUM mmol/L 3.5   CHLORIDE mmol/L 106   CO2 mmol/L 25   BUN mg/dL 16   CREATININE mg/dL 1.13   CALCIUM mg/dL 9.7   ALK PHOS U/L 64   ALT U/L 32   AST U/L 35                            Imaging and diagnostic studies:    Radiology Results: I have personally reviewed pertinent reports. No orders to display       Other Diagnostic Testing: I have personally reviewed pertinent reports. Active medications:  Current Facility-Administered Medications   Medication Dose Route Frequency   • carBAMazepine (TEGretol XR) 12 hr tablet 400 mg  400 mg Oral BID   • [START ON 9/2/2023] carBAMazepine (TEGretol XR) 12 hr tablet 400 mg  400 mg Oral BID   • clonazePAM (KlonoPIN) tablet 1 mg  1 mg Oral TID   • LORazepam (ATIVAN) injection 1 mg  1 mg Intravenous Q4H PRN       Prior to Admission medications    Medication Sig Start Date End Date Taking?  Authorizing Provider   acetaminophen (TYLENOL) 500 mg tablet Take 500 mg by mouth every 4 (four) hours as needed for mild pain    Historical Provider, MD   ammonium lactate (LAC-HYDRIN) 12 % cream Apply 1 application topically 2 (two) times a day    Historical Provider, MD   B Complex Vitamins (VITAMIN B COMPLEX PO) Take 1 tablet by mouth 2 (two) times a day    Historical Provider, MD   benztropine (COGENTIN) 1 mg tablet Take 1 mg by mouth 2 (two) times a day    Historical Provider, MD   betamethasone dipropionate (DIPROSONE) 0.05 % cream Apply 1 application topically 2 (two) times a day    Historical Provider, MD   busPIRone (BUSPAR) 15 mg tablet Take 30 mg by mouth 2 (two) times a day    Historical Provider, MD   busPIRone (BUSPAR) 30 MG tablet  7/5/23   Historical Provider, MD   Calcium Carb-Cholecalciferol (OYSCO 500 + D) 500 mg-200 units per tablet 2 (two) times a day    Historical Provider, MD   carBAMazepine (TEGretol XR) 200 mg 12 hr tablet TAKE TWO TABLETS (400MG) BY MOUTH TWICE DAILY AT 8AM AND 2PM FOR SEIZURES/MOOD 6/20/23   Sherry Lay MD   chlorhexidine (PERIDEX) 0.12 % solution Apply 15 mL to the mouth or throat 2 (two) times a day    Historical Provider, MD   chlorproMAZINE (THORAZINE) 100 mg tablet  7/5/23   Historical Provider, MD   chlorproMAZINE (THORAZINE) 50 mg tablet Take 50 mg by mouth 3 (three) times a day      Historical Provider, MD   clonazePAM (KlonoPIN) 0.5 mg tablet Take 0.5 mg by mouth Takes . 5 mg tablet at 8 am and 2 pm and tane 2 mg tablets at 8 pm    Historical Provider, MD   clonazePAM (KlonoPIN) 2 mg tablet  6/14/23   Historical Provider, MD   Cold Sore Products (HERPECIN-L EX) Apply topically if needed    Historical Provider, MD   diazepam (VALIUM) 2 mg tablet Take 2 mg by mouth every 8 (eight) hours as needed for anxiety    Historical Provider, MD   fexofenadine (ALLEGRA) 180 MG tablet Take 180 mg by mouth daily    Historical Provider, MD   fluticasone (FLONASE) 50 mcg/act nasal spray 1 spray into each nostril daily    Historical Provider, MD   hydrOXYzine HCL (ATARAX) 25 mg tablet Take 25 mg by mouth 3 (three) times a day 12/13/22   Historical Provider, MD   lithium carbonate 300 mg capsule Take 300 mg by mouth 2 (two) times a day with meals    Historical Provider, MD   medroxyPROGESTERone (DEPO-PROVERA) 150 mg/mL injection Inject 1 mL (150 mg total) into a muscle every 3 (three) months 7/26/22   William Cabrales MD   medroxyPROGESTERone (DEPO-PROVERA) 150 mg/mL injection Inject 1 mL (150 mg total) into a muscle every 3 (three) months 7/6/23   William Cabrales MD   medroxyPROGESTERone acetate (DEPO-PROVERA SYRINGE) 150 mg/mL injection  4/11/23   Historical Provider, MD   metoprolol tartrate (LOPRESSOR) 50 mg tablet Take 50 mg by mouth 2 (two) times a day    Historical Provider, MD   Misc.  Devices ([place] Bite Block) MISC Use 2 (two) times a day    Historical Provider, MD   montelukast (SINGULAIR) 10 mg tablet Take 10 mg by mouth daily at bedtime    Historical Provider, MD   Multiple Vitamins-Minerals (CERTAVITE/ANTIOXIDANTS PO) Take 1 tablet by mouth daily    Historical Provider, MD   Multiple Vitamins-Minerals (CertaVite/Antioxidants) TABS  6/8/23   Historical Provider, MD   neomycin-bacitracin-polymyxin b (NEOSPORIN) ointment Apply 1 application topically if needed    Historical Provider, MD ofloxacin (OCUFLOX) 0.3 % ophthalmic solution 1 drop if needed    Historical Provider, MD   olopatadine HCl (PATADAY) 0.2 % opth drops Administer 1 drop to both eyes daily    Historical Provider, MD   potassium chloride (K-DUR,KLOR-CON) 20 mEq tablet Take 1 tablet (20 mEq total) by mouth daily 2/14/23   Ruthann Souza MD   Psyllium (METAMUCIL) WAFR Take 1 Wafer by mouth daily    Historical Provider, MD   risperiDONE (RisperDAL M-TABS) 2 mg dispersible tablet Take 1 mg by mouth 3 (three) times a day 8AM, 2PM, 8PM    Historical Provider, MD   risperiDONE (RisperDAL) 2 mg tablet  6/16/23   Historical Provider, MD   Sunscreens ([place] Alamin 15 EX) Apply topically if needed    Historical Provider, MD   Sunscreens ([address]) Apply topically if needed    Historical Provider, MD   traZODone (DESYREL) 100 mg tablet Take 100 mg by mouth daily at bedtime  Patient not taking: Reported on 2/14/2023    Historical Provider, MD         Code status and advanced directives:  Code Status: No Order      VTE Pharmacologic Prophylaxis: per primary team  VTE Mechanical Prophylaxis: per primary team.     ==  Nancy Mishra M.D.  PGY-3, Neurology

## 2023-09-01 NOTE — ED NOTES
Patient had just used restroom when RN entered room to see if she could provide a sample.   Will try again later     April JEREMIAS Angeles RN  09/01/23 2624

## 2023-09-01 NOTE — ASSESSMENT & PLAN NOTE
46 y.o. 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  Prior to seizures today, patient was in her usual state of health. No provoking factors identified (no missed medication doses, no recent fevers, infections, change in bowel/bladder, recent falls or head injuries, recent sleep deprivation); toxic metabolic work-up unremarkable in the hospital. She returned to her baseline in between the 2 episodes and caregiver confidently reported that the patient was back to her baseline during our evaluation later in the afternoon. Patient's baseline seizure frequency is quite low (last provoked seizure in 2/2023 and seizure-free for years prior to that). Semiology today suggests that the patient had a focal onset seizure with secondary generalization, which may be different from the semiology of her prior seizures as documented in outpatient neurology notes. The breakthrough seizures today may very well represent progression of her epilepsy syndrome. As such, will increase CBZ XR dose. Reviewed prior work-up:  · MR brain scan w/o contrast, Care Everywhere (2014): Unremarkable except limited exam of the pituitary gland. · [address] (2020): Normal  · "Last EEG was unrevealing for source and no focal discharges noted. There was generalized encephalopathy. "-Per neurology note 8/2023. Workup so far 09/01/23:   Patient is afebrile. /79, saturating 100% on room air. BMP unremarkable, LFTs WNL, CBC unremarkable, CBZ level therapeutic (7.6). Lithium 0.7. COVID/FLU/RSV negative       Plan:  · Increase CBZ dose gradually: CBZ XR 500mg in the morning, 400mg at night for 1 week (9/2-9/9). Then 500mg in the morning and evening (9/10 onwards indefinitely).     · Counseled caregiver regarding side effects to watch out for given increased dose: Ataxia, sedation. · Check BMP, trough CBZ level outpatient in 2weeks. · Continue rest of her medications including clonazepam, risperidone unchanged. · No need for head imaging. · Will need follow-up with epilepsy team outpatient in 6 to 8 weeks after discharge. · Patient should continue to follow-up with her PCP, other providers regularly as prior scheduled.

## 2023-09-01 NOTE — ED PROVIDER NOTES
History  Chief Complaint   Patient presents with   • Seizure - Prior Hx Of     Pt presents via EMS from group home with a witnessed focalized seizure. Per EMS, pt had seizure for about 1 min and was post ictal for another minute, hx of epilepsy      Rancho Love is a 46 y.o. female presenting via EMS for witnessed breakthrough focal seizure. 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 Per caregiver at bedside, patient was in a plush chair when she started looking up to the right side, and was unresponsive. Also reported rhythmic right hand movements. Episode lasted for about a minute, and was post-ictal for another minute, after which she returned to her baseline. Per caregivers, no provoking factors, consistent with medications, no recent illnesses, infections, change in activity, falls, change in sleep, issues with urination or bowel movements. The following portions of the patient's history were reviewed and updated as appropriate: allergies, current medications, past family history, past medical history, past social history, past surgical history and problem list.    History collected from EMS and patient caregiver. Patient history limited by non-verbal baseline. Per patient interview and chart review: Allergies include:  -- Inderal (Propranolol) -- Shortness Of Breath  -- Catapres (Clonidine Hcl)   -- Other    --  Nuts    Immunizations:    Immunization History  Administered            Date(s) Administered   COVID-19 PFIZER VACCINE 0.3 ML IM                         2021  06/10/2021  2022    Immunizations Reviewed. Prior to Admission Medications   Prescriptions Last Dose Informant Patient Reported? Taking?    B Complex Vitamins (VITAMIN B COMPLEX PO)  Care Giver Yes No   Sig: Take 1 tablet by mouth 2 (two) times a day   Calcium Carb-Cholecalciferol (OYSCO 500 + D) 500 mg-200 units per tablet  Care Giver Yes No   Si (two) times a day   Cold Sore Products (HERPECIN-L EX)  Care Giver Yes No   Sig: Apply topically if needed   Misc. Devices (Toothette Bite Block) MISC  Care Giver Yes No   Sig: Use 2 (two) times a day   Multiple Vitamins-Minerals (CERTAVITE/ANTIOXIDANTS PO)  Care Giver Yes No   Sig: Take 1 tablet by mouth daily   Multiple Vitamins-Minerals (CertaVite/Antioxidants) TABS  Care Giver Yes No   Psyllium (METAMUCIL) WAFR  Care Giver Yes No   Sig: Take 1 Wafer by mouth daily   Sunscreens (CHAPSTICK SUNBLOCK 15 EX)  Care Giver Yes No   Sig: Apply topically if needed   Sunscreens (NEUTROGENA SENSITIVE SKIN 60+ EX)  Care Giver Yes No   Sig: Apply topically if needed   acetaminophen (TYLENOL) 500 mg tablet  Care Giver Yes No   Sig: Take 500 mg by mouth every 4 (four) hours as needed for mild pain   ammonium lactate (LAC-HYDRIN) 12 % cream  Care Giver Yes No   Sig: Apply 1 application topically 2 (two) times a day   benztropine (COGENTIN) 1 mg tablet  Care Giver Yes No   Sig: Take 1 mg by mouth 2 (two) times a day   betamethasone dipropionate (DIPROSONE) 0.05 % cream  Care Giver Yes No   Sig: Apply 1 application topically 2 (two) times a day   busPIRone (BUSPAR) 15 mg tablet  Care Giver Yes No   Sig: Take 30 mg by mouth 2 (two) times a day   busPIRone (BUSPAR) 30 MG tablet  Care Giver Yes No   Patient not taking: Reported on 8/22/2023   carBAMazepine (TEGretol XR) 200 mg 12 hr tablet  Care Giver No No   Sig: TAKE TWO TABLETS (400MG) BY MOUTH TWICE DAILY AT 8AM AND 2PM FOR SEIZURES/MOOD   chlorhexidine (PERIDEX) 0.12 % solution  Care Giver Yes No   Sig: Apply 15 mL to the mouth or throat 2 (two) times a day   chlorproMAZINE (THORAZINE) 100 mg tablet  Care Giver Yes No   Patient not taking: Reported on 7/7/2023   chlorproMAZINE (THORAZINE) 50 mg tablet  Care Giver Yes No   Sig: Take 50 mg by mouth 3 (three) times a day     clonazePAM (KlonoPIN) 0.5 mg tablet  Care Giver Yes No   Sig: Take 0.5 mg by mouth Takes . 5 mg tablet at 8 am and 2 pm and tane 2 mg tablets at 8 pm   clonazePAM (KlonoPIN) 2 mg tablet  Care Giver Yes No   diazepam (VALIUM) 2 mg tablet  Care Giver Yes No   Sig: Take 2 mg by mouth every 8 (eight) hours as needed for anxiety   fexofenadine (ALLEGRA) 180 MG tablet  Care Giver Yes No   Sig: Take 180 mg by mouth daily   fluticasone (FLONASE) 50 mcg/act nasal spray  Care Giver Yes No   Si spray into each nostril daily   hydrOXYzine HCL (ATARAX) 25 mg tablet  Care Giver Yes No   Sig: Take 25 mg by mouth 3 (three) times a day   lithium carbonate 300 mg capsule  Care Giver Yes No   Sig: Take 300 mg by mouth 2 (two) times a day with meals   medroxyPROGESTERone (DEPO-PROVERA) 150 mg/mL injection  Care Giver No No   Sig: Inject 1 mL (150 mg total) into a muscle every 3 (three) months   medroxyPROGESTERone (DEPO-PROVERA) 150 mg/mL injection  Care Giver No No   Sig: Inject 1 mL (150 mg total) into a muscle every 3 (three) months   medroxyPROGESTERone acetate (DEPO-PROVERA SYRINGE) 150 mg/mL injection  Care Giver Yes No   metoprolol tartrate (LOPRESSOR) 50 mg tablet  Care Giver Yes No   Sig: Take 50 mg by mouth 2 (two) times a day   montelukast (SINGULAIR) 10 mg tablet  Care Giver Yes No   Sig: Take 10 mg by mouth daily at bedtime   neomycin-bacitracin-polymyxin b (NEOSPORIN) ointment  Care Giver Yes No   Sig: Apply 1 application topically if needed   ofloxacin (OCUFLOX) 0.3 % ophthalmic solution  Care Giver Yes No   Si drop if needed   olopatadine HCl (PATADAY) 0.2 % opth drops  Care Giver Yes No   Sig: Administer 1 drop to both eyes daily   potassium chloride (K-DUR,KLOR-CON) 20 mEq tablet  Care Giver No No   Sig: Take 1 tablet (20 mEq total) by mouth daily   risperiDONE (RisperDAL M-TABS) 2 mg dispersible tablet  Care Giver Yes No   Sig: Take 1 mg by mouth 3 (three) times a day 8AM, 2PM, 8PM   risperiDONE (RisperDAL) 2 mg tablet  Care Giver Yes No   Patient not taking: Reported on 2023   traZODone (DESYREL) 100 mg tablet  Care Giver Yes No   Sig: Take 100 mg by mouth daily at bedtime   Patient not taking: Reported on 2/14/2023      Facility-Administered Medications: None       Past Medical History:   Diagnosis Date   • Brain injury [facility])    • Encephalopathy    • Hypertension    • Seizures ([address])        History reviewed. No pertinent surgical history. Family History   Problem Relation Age of Onset   • No Known Problems Mother    • No Known Problems Father      I have reviewed and agree with the history as documented. E-Cigarette/Vaping   • E-Cigarette Use Never User      E-Cigarette/Vaping Substances   • Nicotine No    • THC No    • CBD No      Social History     Tobacco Use   • Smoking status: Never   • Smokeless tobacco: Never   Vaping Use   • Vaping Use: Never used   Substance Use Topics   • Alcohol use: No   • Drug use: No        Review of Systems   Unable to perform ROS: Patient nonverbal       Physical Exam  ED Triage Vitals [09/01/23 1146]   Temperature Pulse Respirations Blood Pressure SpO2   97.7 °F (36.5 °C) 90 20 138/79 100 %      Temp Source Heart Rate Source Patient Position - Orthostatic VS BP Location FiO2 (%)   Oral Monitor Sitting Right arm --      Pain Score       --             Orthostatic Vital Signs  Vitals:    09/01/23 1146   BP: 138/79   Pulse: 90   Patient Position - Orthostatic VS: Sitting       Physical Exam  Vitals and nursing note reviewed. Exam conducted with a chaperone present. Constitutional:       General: She is not in acute distress. Appearance: Normal appearance. She is not ill-appearing or toxic-appearing. Comments: At her baseline per present caregiver   HENT:      Head: Atraumatic. Comments: Chronic occipital wound noted, well healed. Eyes:      Conjunctiva/sclera: Conjunctivae normal.   Cardiovascular:      Rate and Rhythm: Normal rate and regular rhythm. Heart sounds: No murmur heard. Pulmonary:      Effort: Pulmonary effort is normal. No respiratory distress. Breath sounds: Normal breath sounds. Abdominal:      Palpations: Abdomen is soft. Tenderness: There is no abdominal tenderness. Musculoskeletal:         General: No swelling. Cervical back: Neck supple. Skin:     General: Skin is warm and dry. Capillary Refill: Capillary refill takes less than 2 seconds. Neurological:      Mental Status: She is alert. ED Medications  Medications - No data to display    Diagnostic Studies  Results Reviewed     None                 No orders to display         Procedures  Procedures      ED Course  ED Course as of 09/03/23 1739   Fri Sep 01, 2023   1150 Blood Pressure: 138/79   1318 It was reported that patient had an event while in the ER, sitting and staring to the right side, with observed right-sided clonic activity that lasted approximately 1 minute, very brief postictal period and then returned to her baseline. Is now at her baseline interacting with . Will have neurology evaluate. 1323 CARBAMAZEPINE LEVEL: 7.6  wnl   1405 Reevaluated at bedside, currently sitting comfortably, interacting at baseline. 1406 Sodium: 137  wnl   1407 Creatinine: 1.13  Creatinine at baseline, last 1 month ago 1.22                                         Medical Decision Making  Initial ED assessment:  Patient is a 47-year-old female past medical history of focal epilepsy on carbamazepime and cloazepam who presented to the ED for witnessed seizure activity. First staring spell with rythmic arm motions witnessed at her group home, she had a second episode while in the ED. Per caretaker, no recent triggers. Afebrile, vital signs within normal limits. No gross abnormalities noted on physical exam, heart regular rate and rhythm, lungs clear bilaterally, abdomen soft, non-tender, non-distended, pulses intact in all four extremities. No recent wounds.     Initial DDx includes but is not limited to:  - Breakthrough seizures - will reach out to neurology  - Inadequate medication doses - will get carbamaezpime and lithium levels  - Metabolic abnormalities - CMP, hepatic panel  - Infection - will get CBC, COVID/FLU/RSV    Will monitor for further events. Will give patient her afternoon medications while she is in the ED. Workup:   Due to patient's history and presentation the following evidence was collected:  Vital Signs:  -----------------------------------------------------------            09/01/23 09/01/23 09/02/23 09/02/23                  1939       2209      0740        0831        -----------------------------------------------------------   BP:     (!) 163/116  149/96    130/82                    BP Location:                     Right arm                   Pulse:    (!) 120   (!) 106      78                      Resp:                            19                      Temp:                                                    TempSrc:                                                 SpO2:      100%                 99%                      Height:                                5' 4" (1.626 m)  -----------------------------------------------------------    Laboratory Results:   Labs Reviewed  COVID19, INFLUENZA A/B, RSV PCR, [facility] - Normal     SARS-CoV-2                    Negative Ref Range: Negative  Final                               INFLUENZA A PCR               Negative Ref Range: Negative  Final                               INFLUENZA B PCR               Negative Ref Range: Negative  Final                               RSV PCR                       Negative Ref Range: Negative  Final                                   Narrative: FOR PEDIATRIC PATIENTS - copy/paste COVID Guidelines URL to browser: [email or web address]                                    SARS-CoV-2 assay is a Nucleic Acid Amplification assay intended for the                  qualitative detection of nucleic acid from SARS-CoV-2 in nasopharyngeal                  swabs. Results are for the presumptive identification of SARS-CoV-2 RNA. Positive results are indicative of infection with SARS-CoV-2, the virus                  causing COVID-19, but do not rule out bacterial infection or co-infection                  with other viruses. Laboratories within the [facility] and its                  territories are required to report all positive results to the appropriate                  public health authorities. Negative results do not preclude SARS-CoV-2                  infection and should not be used as the sole basis for treatment or other                  patient management decisions. Negative results must be combined with                  clinical observations, patient history, and epidemiological information. This test has not been FDA cleared or approved. This test has been authorized by FDA under an Emergency Use Authorization                  (EUA). This test is only authorized for the duration of time the                  declaration that circumstances exist justifying the authorization of the                  emergency use of an in vitro diagnostic tests for detection of SARS-CoV-2                  virus and/or diagnosis of COVID-19 infection under section 564(b)(1) of                  the Act, 21 U. S.C. 682DLF-7(D)(2), unless the authorization is terminated                  or revoked sooner. The test has been validated but independent review by FDA                  and CLIA is pending. Test performed using [employer]: This RT-PCR assay targets N2,                  a region unique to SARS-CoV-2. A conserved region in the E-gene was chosen                  for pan-Sarbecovirus detection which includes SARS-CoV-2.                                     According to CMS-2020-01-R, this platform meets the definition of high-throughput technology.   LITHIUM LEVEL - Normal     Lithium Lvl mmol/L            0.7     Ref Range: 0.6 - 1.2 mmol/L  Final  CARBAMAZEPINE LEVEL, TOTAL - Normal     Carbamazepine Lvl ug/mL       7.6     Ref Range: 4.0 - 12.0 ug/mL  Final  HEPATIC FUNCTION PANEL - Normal     Total Bilirubin mg/dL         0.30    Ref Range: 0.20 - 1.00 mg/dL  Final                         Use of this assay is not recommended for patients unde*     Bilirubin, Direct mg/dL       0.13    Ref Range: 0.00 - 0.20 mg/dL  Final     Alkaline Phosphatase U/L      64      Ref Range: 34 - 104 U/L  Final     AST U/L                       35      Ref Range: 13 - 39 U/L  Final     ALT U/L                       32      Ref Range: 7 - 52 U/L  Final                         Specimen collection should occur prior to Sulfasalazin*     Total Protein g/dL            7.3     Ref Range: 6.4 - 8.4 g/dL  Final     Albumin g/dL                  4.0     Ref Range: 3.5 - 5.0 g/dL  Final  BASIC METABOLIC PANEL     Sodium mmol/L                 137     Ref Range: 135 - 147 mmol/L  Final     Potassium mmol/L              3.5     Ref Range: 3.5 - 5.3 mmol/L  Final     Chloride mmol/L               106     Ref Range: 96 - 108 mmol/L  Final     CO2 mmol/L                    25      Ref Range: 21 - 32 mmol/L  Final     ANION GAP mmol/L              6       Ref Range: mmol/L  Final     BUN mg/dL                     16      Ref Range: 5 - 25 mg/dL  Final     Creatinine mg/dL              1.13    Ref Range: 0.60 - 1.30 mg/dL  Final                         Standardized to IDMS reference method     Glucose mg/dL                 110     Ref Range: 65 - 140 mg/dL  Final                         If the patient is fasting, the ADA then defines impair*     Calcium mg/dL                 9.7     Ref Range: 8.4 - 10.2 mg/dL  Final     eGFR ml/min/1.73sq m          56      Ref Range: ml/min/1.73sq m  Final         Narrative: [place] guidelines for Chronic Kidney Disease (CKD):                   •  Stage 1 with normal or high GFR (GFR > 90 mL/min/1.73 square meters)                  •  Stage 2 Mild CKD (GFR = 60-89 mL/min/1.73 square meters)                  •  Stage 3A Moderate CKD (GFR = 45-59 mL/min/1.73 square meters)                  •  Stage 3B Moderate CKD (GFR = 30-44 mL/min/1.73 square meters)                  •  Stage 4 Severe CKD (GFR = 15-29 mL/min/1.73 square meters)                  •  Stage 5 End Stage CKD (GFR <15 mL/min/1.73 square meters)                  Note: GFR calculation is accurate only with a steady state creatinine  CBC AND DIFFERENTIAL    Based on presentation, complaints, physical exam, laboratory, and imaging findings, patient was administered:    Medications  dextrose 5 % and sodium chloride 0.45 % infusion (0 mL/hr Intravenous Stopped 9/2/23 0705)  clonazePAM (KlonoPIN) tablet 1 mg (1 mg Oral Given 9/1/23 1351)  carBAMazepine (TEGretol XR) 12 hr tablet 400 mg (400 mg Oral Given 9/1/23 1946)  carBAMazepine (TEGretol XR) 12 hr tablet 100 mg (100 mg Oral Given 9/2/23 0844)    Updates:    See ED Course for further updates. Neurology examined patient at bedside. Also discussed with epileptology who is recommending admission for further evaluation and management. Final ED summary/disposition: After evaluation and workup in the emergency department, [facility] patient's [facility] regarding admission who accepted the patient for further evaluation and management. Amount and/or Complexity of Data Reviewed  Labs: ordered. Decision-making details documented in ED Course. Risk  Prescription drug management. Decision regarding hospitalization.             Disposition  Final diagnoses:   None     ED Disposition     None      Follow-up Information    None         Patient's Medications   Discharge Prescriptions    No medications on file     No discharge procedures on file. PDMP Review     None           ED Provider  Attending physically available and evaluated Brenda Nowak. I managed the patient along with the ED Attending.     Electronically Signed by         Sanford Mendoza MD  09/03/23 1800

## 2023-09-02 VITALS
HEIGHT: 64 IN | OXYGEN SATURATION: 99 % | SYSTOLIC BLOOD PRESSURE: 130 MMHG | HEART RATE: 78 BPM | BODY MASS INDEX: 27.14 KG/M2 | DIASTOLIC BLOOD PRESSURE: 82 MMHG | TEMPERATURE: 97.7 F | RESPIRATION RATE: 19 BRPM

## 2023-09-02 LAB
ANION GAP SERPL CALCULATED.3IONS-SCNC: 8 MMOL/L
BUN SERPL-MCNC: 15 MG/DL (ref 5–25)
CALCIUM SERPL-MCNC: 8.6 MG/DL (ref 8.4–10.2)
CHLORIDE SERPL-SCNC: 106 MMOL/L (ref 96–108)
CO2 SERPL-SCNC: 24 MMOL/L (ref 21–32)
CREAT SERPL-MCNC: 0.95 MG/DL (ref 0.6–1.3)
ERYTHROCYTE [DISTWIDTH] IN BLOOD BY AUTOMATED COUNT: 12.3 % (ref 11.6–15.1)
GFR SERPL CREATININE-BSD FRML MDRD: 70 ML/MIN/1.73SQ M
GLUCOSE SERPL-MCNC: 144 MG/DL (ref 65–140)
HCT VFR BLD AUTO: 38.4 % (ref 34.8–46.1)
HGB BLD-MCNC: 12.2 G/DL (ref 11.5–15.4)
MAGNESIUM SERPL-MCNC: 2 MG/DL (ref 1.9–2.7)
MCH RBC QN AUTO: 30.3 PG (ref 26.8–34.3)
MCHC RBC AUTO-ENTMCNC: 31.8 G/DL (ref 31.4–37.4)
MCV RBC AUTO: 95 FL (ref 82–98)
PLATELET # BLD AUTO: 279 THOUSANDS/UL (ref 149–390)
PMV BLD AUTO: 9.1 FL (ref 8.9–12.7)
POTASSIUM SERPL-SCNC: 5.1 MMOL/L (ref 3.5–5.3)
RBC # BLD AUTO: 4.03 MILLION/UL (ref 3.81–5.12)
SODIUM SERPL-SCNC: 138 MMOL/L (ref 135–147)
WBC # BLD AUTO: 6.25 THOUSAND/UL (ref 4.31–10.16)

## 2023-09-02 PROCEDURE — 83735 ASSAY OF MAGNESIUM: CPT

## 2023-09-02 PROCEDURE — 92610 EVALUATE SWALLOWING FUNCTION: CPT

## 2023-09-02 PROCEDURE — 99239 HOSP IP/OBS DSCHRG MGMT >30: CPT | Performed by: PHYSICIAN ASSISTANT

## 2023-09-02 PROCEDURE — 99232 SBSQ HOSP IP/OBS MODERATE 35: CPT | Performed by: STUDENT IN AN ORGANIZED HEALTH CARE EDUCATION/TRAINING PROGRAM

## 2023-09-02 PROCEDURE — 80048 BASIC METABOLIC PNL TOTAL CA: CPT

## 2023-09-02 PROCEDURE — 85027 COMPLETE CBC AUTOMATED: CPT

## 2023-09-02 RX ORDER — CLONAZEPAM 0.5 MG/1
0.5 TABLET ORAL 3 TIMES DAILY
Refills: 0
Start: 2023-09-02

## 2023-09-02 RX ORDER — SODIUM BICARBONATE 650 MG/1
650 TABLET ORAL DAILY
Refills: 0
Start: 2023-09-02

## 2023-09-02 RX ORDER — FLUTICASONE PROPIONATE 50 MCG
1 SPRAY, SUSPENSION (ML) NASAL DAILY PRN
Refills: 0
Start: 2023-09-02

## 2023-09-02 RX ORDER — BUSPIRONE HYDROCHLORIDE 30 MG/1
30 TABLET ORAL 2 TIMES DAILY
Refills: 0
Start: 2023-09-02

## 2023-09-02 RX ORDER — CARBAMAZEPINE 100 MG/1
100 TABLET, EXTENDED RELEASE ORAL 2 TIMES DAILY
Status: COMPLETED | OUTPATIENT
Start: 2023-09-02 | End: 2023-09-02

## 2023-09-02 RX ORDER — CARBAMAZEPINE 100 MG/1
500 TABLET, EXTENDED RELEASE ORAL DAILY
Status: DISCONTINUED | OUTPATIENT
Start: 2023-09-03 | End: 2023-09-02 | Stop reason: HOSPADM

## 2023-09-02 RX ORDER — CARBAMAZEPINE 100 MG/1
400 TABLET, EXTENDED RELEASE ORAL
Status: DISCONTINUED | OUTPATIENT
Start: 2023-09-02 | End: 2023-09-02 | Stop reason: HOSPADM

## 2023-09-02 RX ORDER — CARBAMAZEPINE 100 MG/1
TABLET, EXTENDED RELEASE ORAL
Qty: 3 TABLET | Refills: 0 | Status: SHIPPED | OUTPATIENT
Start: 2023-09-02 | End: 2023-09-13 | Stop reason: SDUPTHER

## 2023-09-02 RX ORDER — CARBAMAZEPINE 100 MG/1
500 TABLET, EXTENDED RELEASE ORAL 2 TIMES DAILY
Status: DISCONTINUED | OUTPATIENT
Start: 2023-09-02 | End: 2023-09-02

## 2023-09-02 RX ADMIN — HEPARIN SODIUM 5000 UNITS: 5000 INJECTION INTRAVENOUS; SUBCUTANEOUS at 06:30

## 2023-09-02 RX ADMIN — RISPERIDONE 1 MG: 1 TABLET, ORALLY DISINTEGRATING ORAL at 08:42

## 2023-09-02 RX ADMIN — CLONAZEPAM 1 MG: 1 TABLET ORAL at 08:41

## 2023-09-02 RX ADMIN — BUSPIRONE HYDROCHLORIDE 30 MG: 10 TABLET ORAL at 08:41

## 2023-09-02 RX ADMIN — RISPERIDONE 1 MG: 1 TABLET, ORALLY DISINTEGRATING ORAL at 14:02

## 2023-09-02 RX ADMIN — CHLORPROMAZINE HYDROCHLORIDE 50 MG: 25 TABLET, FILM COATED ORAL at 14:02

## 2023-09-02 RX ADMIN — CARBAMAZEPINE 100 MG: 100 TABLET, EXTENDED RELEASE ORAL at 08:44

## 2023-09-02 RX ADMIN — BENZTROPINE MESYLATE 1 MG: 1 TABLET ORAL at 08:43

## 2023-09-02 RX ADMIN — CHLORPROMAZINE HYDROCHLORIDE 50 MG: 25 TABLET, FILM COATED ORAL at 08:43

## 2023-09-02 RX ADMIN — LITHIUM CARBONATE 300 MG: 300 CAPSULE, GELATIN COATED ORAL at 08:40

## 2023-09-02 RX ADMIN — METOPROLOL TARTRATE 50 MG: 50 TABLET, FILM COATED ORAL at 08:41

## 2023-09-02 RX ADMIN — HYDROXYZINE HYDROCHLORIDE 25 MG: 25 TABLET ORAL at 08:41

## 2023-09-02 RX ADMIN — CARBAMAZEPINE 400 MG: 100 TABLET, EXTENDED RELEASE ORAL at 08:44

## 2023-09-02 NOTE — UTILIZATION REVIEW
Initial Clinical Review    Admission: Date/Time/Statement:   Admission Orders (From admission, onward)     Ordered        09/01/23 1714  INPATIENT ADMISSION  Once                      Orders Placed This Encounter   Procedures   • INPATIENT ADMISSION     Standing Status:   Standing     Number of Occurrences:   1     Order Specific Question:   Level of Care     Answer:   Med Surg [16]     Order Specific Question:   Estimated length of stay     Answer:   More than 2 Midnights     Order Specific Question:   Certification     Answer:   I certify that inpatient services are medically necessary for this patient for a duration of greater than two midnights. See H&P and MD Progress Notes for additional information about the patient's course of treatment. ED Arrival Information     Expected   -    Arrival   9/1/2023 11:43    Acuity   Urgent            Means of arrival   Ambulance    Escorted by   [facility]    Service   Hospitalist    Admission type   Emergency            Arrival complaint   focal seizure           Chief Complaint   Patient presents with   • Seizure - Prior Hx Of     Pt presents via EMS from group home with a witnessed focalized seizure. Per EMS, pt had seizure for about 1 min and was post ictal for another minute, hx of epilepsy        Initial Presentation: 46 y.o. female from group home to ED via ems admitted inpatient due to Breakthrough seizures/Difficulty swallowing. 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 Presented due to focal seizure lasting about 1 minute followed by post ictal period of one minute just piror to arrival.  Per care giver concern of choking when eating. On exam follows commands. Gait abnormal  Non verbal at baseline. Scar tissue on back of head from childhood traumatic injury. Carbamazepine 7.6.    In the ED given clonazepam.  Had seizure in ED and on arrival to Med surg floor. Plan is consult neurology. Continue carbamazepine and per neuro to increase. Seizure precautions . 9/1/23 per neurology - patient had 2 witnessed breakthrough seizures on day of arrival and suspect due epilepsy not being managed on current regimen. past medical history pertinent for static encephalopathy, focal epilepsy (suspected localization-related due to childhood parenchymal injury) on CBZ  mg twice daily and clonazepam 1 mg 3 times daily. Discussed with OP epileptologist and plan is to increase CBZ XR to 500 mg twice daily.        ED Triage Vitals [09/01/23 1146]   Temperature Pulse Respirations Blood Pressure SpO2   97.7 °F (36.5 °C) 90 20 138/79 100 %      Temp Source Heart Rate Source Patient Position - Orthostatic VS BP Location FiO2 (%)   Oral Monitor Sitting Right arm --      Pain Score       --          Wt Readings from Last 1 Encounters:   08/22/23 71.7 kg (158 lb 1.6 oz)     Additional Vital Signs:   09/02/23 0740 -- 78 19 130/82 102 99 % -- Sitting   09/01/23 2209 -- 106 Abnormal  -- 149/96 -- -- -- --   09/01/23 1939 -- 120 Abnormal  -- 163/116 Abnormal  -- 100 % -- --   09/01/23 1802 -- -- -- 205/113 Abnormal   -- 96 % Nasal cannula Lying   BP: actively moving around during blood pressure at 09/01/23 1802   09/01/23 1502 -- 90 20 147/93 -- 100 % None (Room air) Sitting   09/01/23 1304 -- 99 20 -- -- 100 % None (Room air)      Date and Time Eye Opening Best Verbal Response Best Motor Response Ahsan Coma Scale Score   09/02/23 0830 4 3 6 13   09/02/23 0200 4 3 6 13   09/01/23 1915 4 3 6 13   09/01/23 1201 4 3 6 13     Pertinent Labs/Diagnostic Test Results: no imaging   No orders to display     Results from last 7 days   Lab Units 09/01/23  1656   SARS-COV-2  Negative     Results from last 7 days   Lab Units 09/02/23  1100 09/01/23  1656   WBC Thousand/uL 6.25 4.82   HEMOGLOBIN g/dL 12.2 13.3   HEMATOCRIT % 38.4 41.8   PLATELETS Thousands/uL 279 278   NEUTROS ABS Thousands/µL  --  2.55     Results from last 7 days   Lab Units 09/02/23  1100 09/02/23  1058 09/01/23  1217   SODIUM mmol/L  --  138 137   POTASSIUM mmol/L  --  5.1 3.5   CHLORIDE mmol/L  --  106 106   CO2 mmol/L  --  24 25   ANION GAP mmol/L  --  8 6   BUN mg/dL  --  15 16   CREATININE mg/dL  --  0.95 1.13   EGFR ml/min/1.73sq m  --  70 56   CALCIUM mg/dL  --  8.6 9.7   MAGNESIUM mg/dL 2.0  --   --      Results from last 7 days   Lab Units 09/01/23  1217   AST U/L 35   ALT U/L 32   ALK PHOS U/L 64   TOTAL PROTEIN g/dL 7.3   ALBUMIN g/dL 4.0   TOTAL BILIRUBIN mg/dL 0.30   BILIRUBIN DIRECT mg/dL 0.13     Results from last 7 days   Lab Units 09/02/23  1058 09/01/23  1217   GLUCOSE RANDOM mg/dL 144* 110     Results from last 7 days   Lab Units 09/01/23  1656   INFLUENZA A PCR  Negative   INFLUENZA B PCR  Negative   RSV PCR  Negative       ED Treatment:   Medication Administration from 09/01/2023 1143 to 09/01/2023 1759       Date/Time Order Dose Route Action Comments     09/01/2023 1351 EDT clonazePAM (KlonoPIN) tablet 1 mg 1 mg Oral Given --        Past Medical History:   Diagnosis Date   • Brain injury ([address])    • Encephalopathy    • Hypertension    • Seizures ([address])      Present on Admission:  • Breakthrough seizures in setting of underlying epilepsy, unprovoked      Admitting Diagnosis: Epilepsy ([address]) [G40.909]  Seizure ([address]) [R56.9]  Benign hypertension with CKD (chronic kidney disease) stage III ([address]) [I12.9, N18.30]  Age/Sex: 46 y.o. female  Admission Orders:  09/01/23 1714 inpatient to med surg  Scheduled Medications:  Medication   • carBAMazepine (TEGretol XR) 12 hr tablet 100 mg 2 times daily Route: PO   • carBAMazepine (TEGretol XR) 12 hr tablet 400 mg  2 times daily Route: PO    • LORazepam (ATIVAN) injection 1 mg IV prn - x 1 9/1   • heparin (porcine) subcutaneous injection 5,000 Units  Every 8 hours scheduled Route: SC   • lithium carbonate capsule 300 mg  2 times daily with meals Route: PO   • risperiDONE (RisperDAL M-TAB) disintegrating tablet 1 mg  3 times daily Route: PO   • montelukast (SINGULAIR) tablet 10 mg  Daily at bedtime Route: PO   • metoprolol tartrate (LOPRESSOR) tablet 50 mg : 2 times daily Route: PO    • hydrOXYzine HCL (ATARAX) tablet 25 mg 3 times daily Route: PO    • chlorproMAZINE (THORAZINE) tablet 50 mg  3 times daily Route: PO   • busPIRone (BUSPAR) tablet 30 mg  2 times daily Route: PO   • benztropine (COGENTIN) tablet 1 mg  2 times daily Route: PO   • clonazePAM (KlonoPIN) tablet 1 mg  3 times daily Route: PO     dextrose 5 % and sodium chloride 0.45 % infusion  Rate: 100 mL/hr Dose: 100 mL/hr  Freq: Continuous Route: IV  Indications of Use: IV Hydration  Last Dose: Stopped (09/02/23 0705)  Start: 09/01/23 2045 End: 09/02/23 0758    1:1 continual observation   9/1/23 NPO  9/2/23 dysphagia diet   Seizure precautions. IP CONSULT TO NEUROLOGY    Network Utilization Review Department  ATTENTION: Please call with any questions or concerns to [phone] and carefully listen to the prompts so that you are directed to the right person. All voicemails are confidential.  Lian Loving all requests for admission clinical reviews, approved or denied determinations and any other requests to dedicated fax number below belonging to the campus where the patient is receiving treatment.  List of dedicated fax numbers for the Facilities:  [place] DENIALS (Administrative/Medical Necessity) [phone] [address] [facility] [phone]   [address] [place] [address] [address]  [place] [facility] [phone]

## 2023-09-02 NOTE — PLAN OF CARE
Problem: PAIN - ADULT  Goal: Verbalizes/displays adequate comfort level or baseline comfort level  Description: Interventions:  - Encourage patient to monitor pain and request assistance  - Assess pain using appropriate pain scale  - Administer analgesics based on type and severity of pain and evaluate response  - Implement non-pharmacological measures as appropriate and evaluate response  - Consider cultural and social influences on pain and pain management  - Notify physician/advanced practitioner if interventions unsuccessful or patient reports new pain  9/2/2023 1347 by Breanna Arana RN  Outcome: Adequate for Discharge  9/2/2023 0931 by Breanna Arana RN  Outcome: Progressing  9/2/2023 0931 by Breanna Arana RN  Outcome: Progressing     Problem: INFECTION - ADULT  Goal: Absence or prevention of progression during hospitalization  Description: INTERVENTIONS:  - Assess and monitor for signs and symptoms of infection  - Monitor lab/diagnostic results  - Monitor all insertion sites, i.e. indwelling lines, tubes, and drains  - Monitor endotracheal if appropriate and nasal secretions for changes in amount and color  - [place] appropriate cooling/warming therapies per order  - Administer medications as ordered  - Instruct and encourage patient and family to use good hand hygiene technique  - Identify and instruct in appropriate isolation precautions for identified infection/condition  9/2/2023 1347 by Breanna Arana RN  Outcome: Adequate for Discharge  9/2/2023 0931 by Breanna Arana RN  Outcome: Progressing  9/2/2023 0931 by Breanna Arana RN  Outcome: Progressing  Goal: Absence of fever/infection during neutropenic period  Description: INTERVENTIONS:  - Monitor WBC    9/2/2023 1347 by Breanna Arana RN  Outcome: Adequate for Discharge  9/2/2023 0931 by Breanna Arana RN  Outcome: Progressing  9/2/2023 0931 by Breanna Arana RN  Outcome: Progressing     Problem: SAFETY ADULT  Goal: Patient will remain free of falls  Description: INTERVENTIONS:  - Educate patient/family on patient safety including physical limitations  - Instruct patient to call for assistance with activity   - Consult OT/PT to assist with strengthening/mobility   - Keep Call bell within reach  - Keep bed low and locked with side rails adjusted as appropriate  - Keep care items and personal belongings within reach  - Initiate and maintain comfort rounds  - Make Fall Risk Sign visible to staff  - Apply yellow socks and bracelet for high fall risk patients  - Consider moving patient to room near nurses station  9/2/2023 1347 by Roopa Mcmillan RN  Outcome: Adequate for Discharge  9/2/2023 0931 by Roopa Mcmillan RN  Outcome: Progressing  9/2/2023 0931 by Roopa Mcmillan RN  Outcome: Progressing  Goal: Maintain or return to baseline ADL function  Description: INTERVENTIONS:  -  Assess patient's ability to carry out ADLs; assess patient's baseline for ADL function and identify physical deficits which impact ability to perform ADLs (bathing, care of mouth/teeth, toileting, grooming, dressing, etc.)  - Assess/evaluate cause of self-care deficits   - Assess range of motion  - Assess patient's mobility; develop plan if impaired  - Assess patient's need for assistive devices and provide as appropriate  - Encourage maximum independence but intervene and supervise when necessary  - Involve family in performance of ADLs  - Assess for home care needs following discharge   - Consider OT consult to assist with ADL evaluation and planning for discharge  - Provide patient education as appropriate  9/2/2023 1347 by Roopa Mcmillan RN  Outcome: Adequate for Discharge  9/2/2023 0931 by Roopa Mcmillan RN  Outcome: Progressing  9/2/2023 0931 by Roopa Mcmillan RN  Outcome: Progressing  Goal: Maintains/Returns to pre admission functional level  Description: INTERVENTIONS:  - Perform BMAT or MOVE assessment daily.   - Set and communicate daily mobility goal to care team and patient/family/caregiver. - Collaborate with rehabilitation services on mobility goals if consulted  - Out of bed for toileting  - Record patient progress and toleration of activity level   9/2/2023 1347 by Annabel Montes RN  Outcome: Adequate for Discharge  9/2/2023 0931 by Annabel Montes RN  Outcome: Progressing  9/2/2023 0931 by Annabel Montes RN  Outcome: Progressing     Problem: Knowledge Deficit  Goal: Patient/family/caregiver demonstrates understanding of disease process, treatment plan, medications, and discharge instructions  Description: Complete learning assessment and assess knowledge base. Interventions:  - Provide teaching at level of understanding  - Provide teaching via preferred learning methods  9/2/2023 1347 by Annabel Montes RN  Outcome: Adequate for Discharge  9/2/2023 0931 by Annabel Montes RN  Outcome: Progressing  9/2/2023 0931 by Annabel Montes RN  Outcome: Progressing     Problem: Nutrition/Hydration-ADULT  Goal: Nutrient/Hydration intake appropriate for improving, restoring or maintaining nutritional needs  Description: Monitor and assess patient's nutrition/hydration status for malnutrition. Collaborate with interdisciplinary team and initiate plan and interventions as ordered. Monitor patient's weight and dietary intake as ordered or per policy. Utilize nutrition screening tool and intervene as necessary. Determine patient's food preferences and provide high-protein, high-caloric foods as appropriate.      INTERVENTIONS:  - Monitor oral intake, urinary output, labs, and treatment plans  - Assess nutrition and hydration status and recommend course of action  - Evaluate amount of meals eaten  - Assist patient with eating if necessary   - Allow adequate time for meals  - Recommend/ encourage appropriate diets, oral nutritional supplements, and vitamin/mineral supplements  - Order, calculate, and assess calorie counts as needed  - Recommend, monitor, and adjust tube feedings based on assessed needs  - Assess need for intravenous fluids  - Provide nutrition/hydration education as appropriate  - Include patient/family/caregiver in decisions related to nutrition  9/2/2023 1347 by Keron Heath RN  Outcome: Adequate for Discharge  9/2/2023 0931 by Keron Heath RN  Outcome: Progressing  9/2/2023 0931 by Keron Heath RN  Outcome: Progressing     Problem: Prexisting or High Potential for Compromised Skin Integrity  Goal: Skin integrity is maintained or improved  Description: INTERVENTIONS:  - Identify patients at risk for skin breakdown  - Assess and monitor skin integrity  - Assess and monitor nutrition and hydration status  - Monitor labs   - Assess for incontinence   - Turn and reposition patient  - Assist with mobility/ambulation  - Relieve pressure over bony prominences  - Avoid friction and shearing  - Provide appropriate hygiene as needed including keeping skin clean and dry  - Evaluate need for skin moisturizer/barrier cream  - Collaborate with interdisciplinary team   - Patient/family teaching  - Consider wound care consult   9/2/2023 1347 by Keron Heath RN  Outcome: Adequate for Discharge  9/2/2023 0931 by Keron Heath RN  Outcome: Progressing  9/2/2023 0931 by Keron Heath RN  Outcome: Progressing     Problem: NEUROSENSORY - ADULT  Goal: Achieves stable or improved neurological status  Description: INTERVENTIONS  - Monitor and report changes in neurological status  - Monitor vital signs such as temperature, blood pressure, glucose, and any other labs ordered   - Initiate measures to prevent increased intracranial pressure  - Monitor for seizure activity and implement precautions if appropriate      9/2/2023 1347 by Kim Brandon Britt Newton RN  Outcome: Adequate for Discharge  9/2/2023 0931 by Rachel Hanson RN  Outcome: Progressing  Goal: Remains free of injury related to seizures activity  Description: INTERVENTIONS  - Maintain airway, patient safety  and administer oxygen as ordered  - Monitor patient for seizure activity, document and report duration and description of seizure to physician/advanced practitioner  - If seizure occurs,  ensure patient safety during seizure  - Reorient patient post seizure  - Seizure pads on all 4 side rails  - Instruct patient/family to notify RN of any seizure activity including if an aura is experienced  - Instruct patient/family to call for assistance with activity based on nursing assessment  - Administer anti-seizure medications if ordered    9/2/2023 1347 by Rachel Hanson RN  Outcome: Adequate for Discharge  9/2/2023 0931 by Rachel Hanson RN  Outcome: Progressing  Goal: Achieves maximal functionality and self care  Description: INTERVENTIONS  - Monitor swallowing and airway patency with patient fatigue and changes in neurological status  - Encourage and assist patient to increase activity and self care.    - Encourage visually impaired, hearing impaired and aphasic patients to use assistive/communication devices  9/2/2023 1347 by Rachel Hanson RN  Outcome: Adequate for Discharge  9/2/2023 0931 by Rachel Hanson RN  Outcome: Progressing

## 2023-09-02 NOTE — ASSESSMENT & PLAN NOTE
Patient accompanied by caregiver  He does not know the CODE STATUS of the patient  Tried contacting her mother, to determine the CODE STATUS but no one picked the call  Caregiver will contact the facility to find out who the power of  for the patient is and what the CODE STATUS is

## 2023-09-02 NOTE — PLAN OF CARE
Recommendations:   Diet: mechanically altered/level 2 diet and thin liquids   Meds: as preferred/ tolerated at group home   Feeding assistance: 1:1 close physical monitoring for all meals 2/2 impulsivity  Frequent Oral care: 2-4x/day  Aspiration precautions and compensatory swallowing strategies: upright posture, slow rate of feeding, small bites/sips and alternating bites and sips  Other Recommendations/ considerations: SLP will continue to follow to ensure adequate management of oral diet and adjust as clinically indicated x1-2

## 2023-09-02 NOTE — DISCHARGE SUMMARY
Discharge Summary - [facility] Internal Medicine    Patient Information: Ismael Bradford 46 y.o. female MRN: [id number]  Unit/Bed#: S -01 Encounter: [id number]    Discharging Physician / Practitioner: Alfredo Carreon PA-C  PCP: Tyler Mcmillan MD  Admission Date:   Admission Orders (From admission, onward)     Ordered        09/01/23 1714  INPATIENT ADMISSION  Once                      Discharge Date: 09/02/23    Reason for Admission: breakthrough seizure    Discharge Diagnoses:     Principal Problem:    Breakthrough seizures  Active Problems:    Breakthrough seizures in setting of underlying epilepsy, unprovoked    Hypertension    Developmental disorder, mental retardation    Difficulty swallowing  Resolved Problems:    * No resolved hospital problems. *    Consultations During Hospital Stay:  · neurology    Procedures Performed:     · none    Significant Findings / Test Results:     · none    Incidental Findings:   · none     Test Results Pending at Discharge (will require follow up):   · none     Outpatient Tests Requested:  · BMP 2 weeks  · Tegretol level (30-45 mins before AM dose) 2 weeks  · Epilepsy follow up 6-8 weeks    Complications:  none    Hospital Course:     Ismael Bradford is a 46 y.o. female patient who originally presented to the hospital on 9/1/2023 due to breakthrough seizure. Patient has a past medical history of traumatic brain injury, hypertension, chronic kidney disease stage III and focal epilepsy who presented to the emergency department with breakthrough seizure. The patient is a resident of a group home. She had been seizure-free for the last 7 years until February 2023 when she had an episode of seizure felt to be provoked by sleep deprivation. The patient was in her normal baseline state of health until the patient had a seizure at 10 AM on 9/1/2023.   She was noted to have jerking movements of the bilateral upper and lower extremities with stiffening and turning her head to the right per her caregivers. This lasted 45 seconds. When the patient was in the emergency department she had another episode of seizure activity and was given a dose of IV Ativan. Then had additional seizure on the floor lasting approximately 15 seconds. Patient was seen in consultation by neurology who recommended increasing carbamazepine extended release from 400 mg p.o. twice daily to 500 mg in the morning and 400 mg in the evening for 1 week and then thereafter increasing to 500 mg p.o. twice daily. Recommendation for BMP as well as carbamazepine level 30 to 45 minutes before morning dose to be performed in 2 weeks. All other medications remained unchanged. Patient was recommended to follow-up as an outpatient with epilepsy in 6 to 8 weeks. She returned back to her baseline state of health and had no further seizure activity after initial episode in the emergency department and on arrival to the unit. Condition at Discharge: stable     Discharge Day Visit / Exam:     Subjective:  Per group home staff appears to be her normal self at this time. Per nursing staff no overnight events and no events throughout the day today. Vitals: Blood Pressure: 130/82 (09/02/23 0740)  Pulse: 78 (09/02/23 0740)  Temperature: 97.7 °F (36.5 °C) (09/01/23 1146)  Temp Source: Oral (09/01/23 1502)  Respirations: 19 (09/02/23 0740)  Height: 5' 4" (162.6 cm) (last ht assessment) (09/02/23 0831)  SpO2: 99 % (09/02/23 0740)  Exam:   Physical Exam  Vitals and nursing note reviewed. Constitutional:       General: She is not in acute distress. Appearance: She is not diaphoretic. HENT:      Head: Normocephalic and atraumatic. Cardiovascular:      Rate and Rhythm: Normal rate and regular rhythm. Pulmonary:      Effort: Pulmonary effort is normal.      Breath sounds: No wheezing or rales. Abdominal:      General: Bowel sounds are normal.      Palpations: Abdomen is soft. Tenderness:  There is no abdominal tenderness. There is no guarding. Musculoskeletal:      Right lower leg: No edema. Left lower leg: No edema. Skin:     General: Skin is warm and dry. Neurological:      Comments: Nonverbal. Sitting in bed slapping hands   Psychiatric:      Comments: Pleasant sitting up in bed and giving high 5s       Discussion with Family: group home staff    Discharge instructions/Information to patient and family:   See after visit summary for information provided to patient and family. Provisions for Follow-Up Care:  See after visit summary for information related to follow-up care and any pertinent home health orders. Disposition:     [facility] / [address] [place] [address] at [address] [place] [address] to [facility]:   · Not Applicable to this Patient - Not Applicable to this Patient    Planned Readmission: no     Discharge Statement:  I spent 52 minutes discharging the patient. This time was spent on the day of discharge. I had direct contact with the patient on the day of discharge. Greater than 50% of the total time was spent examining patient, answering all patient questions, arranging and discussing plan of care with patient as well as directly providing post-discharge instructions. Additional time then spent on discharge activities. Discharge Medications:  See after visit summary for reconciled discharge medications provided to patient and family.       ** Please Note: This note has been constructed using a voice recognition system **

## 2023-09-02 NOTE — ASSESSMENT & PLAN NOTE
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 ·   No provoking factors identified (no missed medication doses, no recent fevers, infections, change in bowel/bladder, recent falls or head injuries, recent sleep deprivation). · Patient not noted to have any incontinence after the seizures  · Patient return to baseline after the seizure  · Patient last seizure was a provoked seizure in February 2023 to be due to sleep deprivation  • MR brain scan w/o contrast, Care Everywhere (2014): Unremarkable except limited exam of the pituitary gland. • [address] (2020): Normal  • Last EEG was unrevealing for source and no focal discharges noted. There was generalized encephalopathy. "-Per neurology note 8/2023. • Patient follows up with outpatient neurology (Dr. Jimena Da Silva)   • Per neurology note-MRI in 2008 normal, EEG unrevealing for source with generalized encephalopathy  • The ED vitals stable, CBC, BMP, lithium levels and carbamazepine levels within normal limits  • EKG normal  • Neurology was consulted    Plan  Follow neurology recommendations  Continue carbamazepine 100 mg twice daily  Continue patient's home medications including lithium  • For generalized convulsions concerning for seizure >2mins: Ativan 0.05mg/kg (max 4 mg) IV, if no response, repeat in 3-5 minutes (max dose 0.1 mg/kg). AED load: Levetiracetam bolus (60 mg/kg, max 4500 mg). Stat BMP.   • Follow-up urine analysis, LFTs, RSV/Covid  • No need for head imaging at this time per neurology  • If no provocation is found, neurology team will likely increase CBZ dose to 500mg BID.   • Seizure precautions

## 2023-09-02 NOTE — ASSESSMENT & PLAN NOTE
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

## 2023-09-02 NOTE — SPEECH THERAPY NOTE
Speech-Language Pathology Bedside Swallow Evaluation        Patient Name: Magdi Flores  QMFCM'Z Date: 9/2/2023     Problem List  Principal Problem:    Breakthrough seizures  Active Problems:    Breakthrough seizures in setting of underlying epilepsy, unprovoked    Hypertension    Developmental disorder, mental retardation    Determination of code status    Difficulty swallowing       Past Medical History  Past Medical History:   Diagnosis Date   • Brain injury ([address])    • Encephalopathy    • Hypertension    • Seizures ([address])        Past Surgical History  History reviewed. No pertinent surgical history. Summary/Impressions:    Pt presents with s/s oropharyngeal dysphagia characterized by impulsivity and limited safety awareness/self monitoring. Pt with adequate bolus retrieval. Containment is variable with anterior loss of hard and softened solids, decreased bolus formation suspected. Material noted to fall along anterior sulci during mastication of same. Pt requesting added moisture, sips of liquid to aide in bolus cohesion and transfer. Mild retention. Pt takes 16oz thin liquid in large, consecutive sips. No overt s/s of aspiration or distress at any point. Appears to elicit timely swallows upon palpation. Note, caretaker from group home present and does report occasional choking when taking solids 2/2 observations outlined above. Will suggest initiation of moistened/mech soft solid diet and thin liquids with close supervision during mealtime.         Recommendations:   Diet: mechanically altered/level 2 diet and thin liquids   Meds: as preferred/ tolerated at group home   Feeding assistance: 1:1 close physical monitoring for all meals 2/2 impulsivity  Frequent Oral care: 2-4x/day  Aspiration precautions and compensatory swallowing strategies: upright posture, slow rate of feeding, small bites/sips and alternating bites and sips  Other Recommendations/ considerations: SLP will continue to follow to ensure adequate management of oral diet and adjust as clinically indicated x1-2     Current Medical Status  Pt is a 46 y.o. 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  Prior to seizures today, patient was in her usual state of health. No provoking factors identified (no missed medication doses, no recent fevers, infections, change in bowel/bladder, recent falls or head injuries, recent sleep deprivation); toxic metabolic work-up unremarkable in the hospital. She returned to her baseline in between the 2 episodes and caregiver confidently reported that the patient was back to her baseline later in the afternoon. Per group home caregiver pt w/ hx of dysphagia; meals pre-cut into quarter size pieces though pt continues to choke at times. NPO w/ orders for dysphagia evaluation. Past medical history:  Please see H&P for details    Special Studies:  None presently.      Social/Education/Vocational Hx:  Pt lives in group home    Swallow Information   Current Risks for Dysphagia & Aspiration: known history of dysphagia  Current Symptoms/Concerns: choking incident  Current Diet: NPO   Baseline Diet: thin liquids and pre-cut softer solids    Baseline Assessment   Behavior/Cognition: alert  Speech/Language Status: able to follow commands inconsistently-pt resistant at times   Patient Positioning: upright in bed    Swallow Mechanism Exam   Facial: symmetrical  Labial: WFL  Lingual: unable to test 2/2 limited command following  Velum: unable to visualize  Mandible: adequate ROM  Dentition: adequate  Vocal quality:clear/adequate   Volitional Cough: unable to initiate volitional cough   Respiratory: RA    Consistencies Assessed and Performance   Consistencies Administered: thin liquids, puree, mechanical soft solids, soft solids and hard solids    Oral Stage: Adequate bolus retrieval from spoon and cup. Noted to overstuff oral cavity. Significant impulsivity w/ limited safety awareness. Anterior loss during mastication of dry solids. Decreased oral control of same w/ prolonged transfer. Requesting additional moisture to aide in bolus cohesion and advancement in oral cavity. Mild retention (reg solid>moisture). Benefits from cues for pacing and close monitoring. Pharyngeal Stage: Laryngeal rise noted upon palpation. Swallow initiation appears timely. No overt s/s of aspiration or distress. Vocal quality remains clear/dry. Esophageal Concerns: none reported      Results Reviewed with: patient, RN, MD and caretaker      Plan    Will continue to follow for x1-2  Dysphagia Goals: pt will tolerate mech soft solids with thin liquids without s/s of aspiration x1-2     Pt/caregiver will verbalize understanding of safe feeding guidelines, diet modifications to prevent additional choking episodes as able. Pt will participate in trials for advancement across x1-2 diagnostic sessions if indicated/approrpiate.       Discharge recommendation: likely no follow up needed for swallowing    Speech Therapy Prognosis   Prognosis: fair  Prognosis Considerations: cognitive status        Glory Bethea, [address] [facility], [address] [facility]  Speech-Language Pathologist  [place] #[id number]  [place] #[id number]

## 2023-09-02 NOTE — CASE MANAGEMENT
Case Management Progress Note    Patient name Ronnie Paniagua  Location S /S -21 MRN [id number]  : 1975 Date 2023       LOS (days): 1  Geometric Mean LOS (GMLOS) (days):   Days to GMLOS:        OBJECTIVE:        Current admission status: Inpatient  Preferred Pharmacy:   [address] [place] [address] Road  [address] [facility] [address] [facility] [address]  Phone: [phone] Fax: [address] ([place] - [address]  [address] [address]  Phone: [phone] Fax: [phone]    Primary Care Provider: Ja Sebastian MD    Primary Insurance: [place] [employer]  Secondary Insurance:     PROGRESS NOTE:    Patient cleared for D/C back to [email or web address]. Provider confirmed patient's caregiver is on-site and available to transport patient back to facility today. Caregiver requesting that patient be able to D/C with any new meds for D/C back to facility today due to facility pharmacist not returning until Tuesday. Rx [facility]. [facility] confirmed there is a $0 co-pay for TEGRETOL medication and that it must be picked up prior to pharmacy closing @ 1500 today.  Provider and RN made aware via TT.

## 2023-09-02 NOTE — PLAN OF CARE
Problem: PAIN - ADULT  Goal: Verbalizes/displays adequate comfort level or baseline comfort level  Description: Interventions:  - Encourage patient to monitor pain and request assistance  - Assess pain using appropriate pain scale  - Administer analgesics based on type and severity of pain and evaluate response  - Implement non-pharmacological measures as appropriate and evaluate response  - Consider cultural and social influences on pain and pain management  - Notify physician/advanced practitioner if interventions unsuccessful or patient reports new pain  9/2/2023 0931 by Melo Roca RN  Outcome: Progressing  9/2/2023 0931 by Melo Roca RN  Outcome: Progressing     Problem: INFECTION - ADULT  Goal: Absence or prevention of progression during hospitalization  Description: INTERVENTIONS:  - Assess and monitor for signs and symptoms of infection  - Monitor lab/diagnostic results  - Monitor all insertion sites, i.e. indwelling lines, tubes, and drains  - Monitor endotracheal if appropriate and nasal secretions for changes in amount and color  - [place] appropriate cooling/warming therapies per order  - Administer medications as ordered  - Instruct and encourage patient and family to use good hand hygiene technique  - Identify and instruct in appropriate isolation precautions for identified infection/condition  9/2/2023 0931 by Melo Roca RN  Outcome: Progressing  9/2/2023 0931 by Melo Roca RN  Outcome: Progressing  Goal: Absence of fever/infection during neutropenic period  Description: INTERVENTIONS:  - Monitor WBC    9/2/2023 0931 by Melo Roca RN  Outcome: Progressing  9/2/2023 0931 by Melo Roca RN  Outcome: Progressing     Problem: SAFETY ADULT  Goal: Patient will remain free of falls  Description: INTERVENTIONS:  - Educate patient/family on patient safety including physical limitations  - Instruct patient to call for assistance with activity   - Consult OT/PT to assist with strengthening/mobility   - Keep Call bell within reach  - Keep bed low and locked with side rails adjusted as appropriate  - Keep care items and personal belongings within reach  - Initiate and maintain comfort rounds  - Make Fall Risk Sign visible to staff  - Apply yellow socks and bracelet for high fall risk patients  - Consider moving patient to room near nurses station  9/2/2023 0931 by Fani Blanchard RN  Outcome: Progressing  9/2/2023 0931 by Fani Blanchard RN  Outcome: Progressing  Goal: Maintain or return to baseline ADL function  Description: INTERVENTIONS:  -  Assess patient's ability to carry out ADLs; assess patient's baseline for ADL function and identify physical deficits which impact ability to perform ADLs (bathing, care of mouth/teeth, toileting, grooming, dressing, etc.)  - Assess/evaluate cause of self-care deficits   - Assess range of motion  - Assess patient's mobility; develop plan if impaired  - Assess patient's need for assistive devices and provide as appropriate  - Encourage maximum independence but intervene and supervise when necessary  - Involve family in performance of ADLs  - Assess for home care needs following discharge   - Consider OT consult to assist with ADL evaluation and planning for discharge  - Provide patient education as appropriate  9/2/2023 0931 by Fani Blanchard RN  Outcome: Progressing  9/2/2023 0931 by Fani Blanchard RN  Outcome: Progressing  Goal: Maintains/Returns to pre admission functional level  Description: INTERVENTIONS:  - Perform BMAT or MOVE assessment daily.   - Set and communicate daily mobility goal to care team and patient/family/caregiver.    - Collaborate with rehabilitation services on mobility goals if consulted  - Out of bed for toileting  - Record patient progress and toleration of activity level   9/2/2023 0931 by Fani Balnchard RN  Outcome: Progressing  9/2/2023 0931 by Trey Cifuentes RN  Outcome: Progressing     Problem: DISCHARGE PLANNING  Goal: Discharge to home or other facility with appropriate resources  Description: INTERVENTIONS:  - Identify barriers to discharge w/patient and caregiver  - Arrange for needed discharge resources and transportation as appropriate  - Identify discharge learning needs (meds, wound care, etc.)  - Arrange for interpretive services to assist at discharge as needed  - Refer to Case Management Department for coordinating discharge planning if the patient needs post-hospital services based on physician/advanced practitioner order or complex needs related to functional status, cognitive ability, or social support system  9/2/2023 0931 by Trey Cifuentes RN  Outcome: Progressing  9/2/2023 0931 by Trey Cifuentes RN  Outcome: Progressing     Problem: Knowledge Deficit  Goal: Patient/family/caregiver demonstrates understanding of disease process, treatment plan, medications, and discharge instructions  Description: Complete learning assessment and assess knowledge base. Interventions:  - Provide teaching at level of understanding  - Provide teaching via preferred learning methods  9/2/2023 0931 by Trey Cifuentes RN  Outcome: Progressing  9/2/2023 0931 by Trey Cifuentes RN  Outcome: Progressing     Problem: Nutrition/Hydration-ADULT  Goal: Nutrient/Hydration intake appropriate for improving, restoring or maintaining nutritional needs  Description: Monitor and assess patient's nutrition/hydration status for malnutrition. Collaborate with interdisciplinary team and initiate plan and interventions as ordered. Monitor patient's weight and dietary intake as ordered or per policy. Utilize nutrition screening tool and intervene as necessary. Determine patient's food preferences and provide high-protein, high-caloric foods as appropriate.      INTERVENTIONS:  - Monitor oral intake, urinary output, labs, and treatment plans  - Assess nutrition and hydration status and recommend course of action  - Evaluate amount of meals eaten  - Assist patient with eating if necessary   - Allow adequate time for meals  - Recommend/ encourage appropriate diets, oral nutritional supplements, and vitamin/mineral supplements  - Order, calculate, and assess calorie counts as needed  - Recommend, monitor, and adjust tube feedings based on assessed needs  - Assess need for intravenous fluids  - Provide nutrition/hydration education as appropriate  - Include patient/family/caregiver in decisions related to nutrition  9/2/2023 0931 by Fani Blanchard RN  Outcome: Progressing  9/2/2023 0931 by Fani Blanchard RN  Outcome: Progressing     Problem: Prexisting or High Potential for Compromised Skin Integrity  Goal: Skin integrity is maintained or improved  Description: INTERVENTIONS:  - Identify patients at risk for skin breakdown  - Assess and monitor skin integrity  - Assess and monitor nutrition and hydration status  - Monitor labs   - Assess for incontinence   - Turn and reposition patient  - Assist with mobility/ambulation  - Relieve pressure over bony prominences  - Avoid friction and shearing  - Provide appropriate hygiene as needed including keeping skin clean and dry  - Evaluate need for skin moisturizer/barrier cream  - Collaborate with interdisciplinary team   - Patient/family teaching  - Consider wound care consult   9/2/2023 0931 by Fani Blanchard RN  Outcome: Progressing  9/2/2023 0931 by Fani Blanchard RN  Outcome: Progressing     Problem: NEUROSENSORY - ADULT  Goal: Achieves stable or improved neurological status  Description: INTERVENTIONS  - Monitor and report changes in neurological status  - Monitor vital signs such as temperature, blood pressure, glucose, and any other labs ordered   - Initiate measures to prevent increased intracranial pressure  - Monitor for seizure activity and implement precautions if appropriate      Outcome: Progressing  Goal: Remains free of injury related to seizures activity  Description: INTERVENTIONS  - Maintain airway, patient safety  and administer oxygen as ordered  - Monitor patient for seizure activity, document and report duration and description of seizure to physician/advanced practitioner  - If seizure occurs,  ensure patient safety during seizure  - Reorient patient post seizure  - Seizure pads on all 4 side rails  - Instruct patient/family to notify RN of any seizure activity including if an aura is experienced  - Instruct patient/family to call for assistance with activity based on nursing assessment  - Administer anti-seizure medications if ordered    Outcome: Progressing  Goal: Achieves maximal functionality and self care  Description: INTERVENTIONS  - Monitor swallowing and airway patency with patient fatigue and changes in neurological status  - Encourage and assist patient to increase activity and self care.    - Encourage visually impaired, hearing impaired and aphasic patients to use assistive/communication devices  Outcome: Progressing

## 2023-09-02 NOTE — H&P
[address] [facility]  H&P  Name: Peter Staton 46 y.o. 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 ·   No provoking factors identified (no missed medication doses, no recent fevers, infections, change in bowel/bladder, recent falls or head injuries, recent sleep deprivation). · Patient not noted to have any incontinence after the seizures  · Patient return to baseline after the seizure  · Patient last seizure was a provoked seizure in February 2023 to be due to sleep deprivation  • MR brain scan w/o contrast, Care Everywhere (2014): Unremarkable except limited exam of the pituitary gland. • [address] (2020): Normal  • Last EEG was unrevealing for source and no focal discharges noted. There was generalized encephalopathy. "-Per neurology note 8/2023.   • Patient follows up with outpatient neurology (Dr. Jimena Da Silva)   • Per neurology note-MRI in 2008 normal, EEG unrevealing for source with generalized encephalopathy  • The ED vitals stable, CBC, BMP, lithium levels and carbamazepine levels within normal limits  • EKG normal  • Neurology was consulted    Plan  Follow neurology recommendations  Continue carbamazepine 100 mg twice daily  Continue patient's home medications including lithium  • For generalized convulsions concerning for seizure >2mins: Ativan 0.05mg/kg (max 4 mg) IV, if no response, repeat in 3-5 minutes (max dose 0.1 mg/kg). AED load: Levetiracetam bolus (60 mg/kg, max 4500 mg). Stat BMP. • Follow-up urine analysis, LFTs, RSV/Covid  • No need for head imaging at this time per neurology  • If no provocation is found, neurology team will likely increase CBZ dose to 500mg BID. • Seizure precautions             Difficulty swallowing  Assessment & Plan  When inquired about the diet, caregiver reported concerns for choking  She is given puréed diet or foods cut in quarters at the facility  Plan  N.p.o. for now  Speech and swallow evaluation  IV fluids    Determination of code status  Assessment & Plan  Patient accompanied by caregiver  He does not know the CODE STATUS of the patient  Tried contacting her mother, to determine the CODE STATUS but no one picked the call  Caregiver will contact the facility to find out who the power of  for the patient is and what the CODE STATUS is    Developmental disorder, mental retardation  Assessment & Plan  Per caregiver patient had a traumatic occipital brain injury in the childhood which is thought to be the cause of her mental retardation and developmental disorder  At baseline, she is non verbal  She started having seizures from her childhood after the traumatic brain injury  Patient on lithium, chlorpromazine, benztropine and buspirone  Plan  Continue the home medications        Hypertension  Assessment & Plan  Patient has history of hypertension  On admission blood pressure at baseline  However blood pressure was noted to be high after the seizure episode  Plan  · Continue to monitor blood pressure for now  · Continue home metoprolol          VTE Pharmacologic Prophylaxis: VTE Score: 5 High Risk (Score >/= 5) - Pharmacological DVT Prophylaxis Ordered: heparin. Sequential Compression Devices Ordered. Code Status: Level 1 - Full Code   Discussion with family: Updated  (Caregiver) at bedside.     Anticipated Length of Stay: Patient will be admitted on an observation basis with an anticipated length of stay of less than 2 midnights secondary to Seizures. Chief Complaint: Seizures    History of Present Illness:  [place] Love is a 46 y.o. female with a PMH of focal epilepsy on carbamazepine, lactic occipital injury , pervasive developmental disorder , mental retardation , hypertension , CKD stage III who presents following an episode of seizure at her nursing facility. She has history of seizures from childhood after the traumatic brain injury . She was seizure-free for the past 7 years, until February 2023 when she had an episode of seizure. Seizure at that time was thought to be provoked from sleep deprivation. Per neurology note ,she was on Topamax but was discontinued in 2020 due to side effects. Patient was at her baseline normal state prior to having seizure today at 10 AM.  She was noticed to have jerking movements of bilateral upper and lower extremities with stiffening and head turn to the right side per the caregivers. The seizure lasted for about 45 seconds. No postictal state other than being tired after the seizures. No biting, frothing in the mouth, urinary or bowel incontinence. She did not have any provoking factors including fever, signs of infection, medication changes, sleep deprivation. On admission to the ED, patient vital signs were normal.  Lab work including CBC, BMP, lithium and carbamazepine levels were normal.  Patient had 1 episode of seizure in the ED for which she was given a dose of IV lorazepam and another episode after admission. Review of Systems:  Review of Systems   Unable to perform ROS: Patient nonverbal       Past Medical and Surgical History:   Past Medical History:   Diagnosis Date   • Brain injury [facility])    • Encephalopathy    • Hypertension    • Seizures ([address])        History reviewed. No pertinent surgical history.     Meds/Allergies:  Prior to Admission medications    Medication Sig Start Date End Date Taking? Authorizing Provider   acetaminophen (TYLENOL) 500 mg tablet Take 500 mg by mouth every 4 (four) hours as needed for mild pain   Yes Historical Provider, MD   ammonium lactate (LAC-HYDRIN) 12 % cream Apply 1 application topically 2 (two) times a day   Yes Historical Provider, MD   B Complex Vitamins (VITAMIN B COMPLEX PO) Take 1 tablet by mouth 2 (two) times a day   Yes Historical Provider, MD   benztropine (COGENTIN) 1 mg tablet Take 1 mg by mouth 2 (two) times a day   Yes Historical Provider, MD   betamethasone dipropionate (DIPROSONE) 0.05 % cream Apply 1 application topically 2 (two) times a day   Yes Historical Provider, MD   busPIRone (BUSPAR) 15 mg tablet Take 30 mg by mouth 2 (two) times a day   Yes Historical Provider, MD   Calcium Carb-Cholecalciferol (OYSCO 500 + D) 500 mg-200 units per tablet 2 (two) times a day   Yes Historical Provider, MD   carBAMazepine (TEGretol XR) 200 mg 12 hr tablet TAKE TWO TABLETS (400MG) BY MOUTH TWICE DAILY AT 8AM AND 2PM FOR SEIZURES/MOOD 6/20/23  Yes Eduar Dial MD   chlorhexidine (PERIDEX) 0.12 % solution Apply 15 mL to the mouth or throat 2 (two) times a day   Yes Historical Provider, MD   chlorproMAZINE (THORAZINE) 50 mg tablet Take 50 mg by mouth 3 (three) times a day     Yes Historical Provider, MD   clonazePAM (KlonoPIN) 0.5 mg tablet Take 0.5 mg by mouth Takes . 5 mg tablet at 8 am and 2 pm and tane 2 mg tablets at 8 pm   Yes Historical Provider, MD   diazepam (VALIUM) 2 mg tablet Take 2 mg by mouth every 8 (eight) hours as needed for anxiety   Yes Historical Provider, MD   fexofenadine (ALLEGRA) 180 MG tablet Take 180 mg by mouth daily   Yes Historical Provider, MD   fluticasone (FLONASE) 50 mcg/act nasal spray 1 spray into each nostril daily   Yes Historical Provider, MD   hydrOXYzine HCL (ATARAX) 25 mg tablet Take 25 mg by mouth 3 (three) times a day 12/13/22  Yes Historical Provider, MD   lithium carbonate 300 mg capsule Take 300 mg by mouth 2 (two) times a day with meals   Yes Historical Provider, MD   metoprolol tartrate (LOPRESSOR) 50 mg tablet Take 50 mg by mouth 2 (two) times a day   Yes Historical Provider, MD   montelukast (SINGULAIR) 10 mg tablet Take 10 mg by mouth daily at bedtime   Yes Historical Provider, MD   Multiple Vitamins-Minerals (CERTAVITE/ANTIOXIDANTS PO) Take 1 tablet by mouth daily   Yes Historical Provider, MD   neomycin-bacitracin-polymyxin b (NEOSPORIN) ointment Apply 1 application topically if needed   Yes Historical Provider, MD   olopatadine HCl (PATADAY) 0.2 % opth drops Administer 1 drop to both eyes daily   Yes Historical Provider, MD   Psyllium (METAMUCIL) WAFR Take 1 Wafer by mouth daily   Yes Historical Provider, MD   risperiDONE (RisperDAL M-TABS) 2 mg dispersible tablet Take 1 mg by mouth 3 (three) times a day 8AM, 2PM, 8PM   Yes Historical Provider, MD   Sunscreens (CHAPSTICK SUNBLOCK 15 EX) Apply topically if needed   Yes Historical Provider, MD   Sunscreens (NEUTROGENA SENSITIVE SKIN 60+ EX) Apply topically if needed   Yes Historical Provider, MD   busPIRone (BUSPAR) 30 MG tablet  7/5/23   Historical Provider, MD   chlorproMAZINE (THORAZINE) 100 mg tablet  7/5/23   Historical Provider, MD   clonazePAM (KlonoPIN) 2 mg tablet  6/14/23   Historical Provider, MD   Cold Sore Products (HERPECIN-L EX) Apply topically if needed    Historical Provider, MD   medroxyPROGESTERone (DEPO-PROVERA) 150 mg/mL injection Inject 1 mL (150 mg total) into a muscle every 3 (three) months 7/26/22   Reese Harden MD   medroxyPROGESTERone (DEPO-PROVERA) 150 mg/mL injection Inject 1 mL (150 mg total) into a muscle every 3 (three) months 7/6/23   Reese Harden MD   medroxyPROGESTERone acetate (DEPO-PROVERA SYRINGE) 150 mg/mL injection  4/11/23   Historical Provider, MD   Misc.  Devices (Toothette Bite Block) MISC Use 2 (two) times a day    Historical Provider, MD   Multiple Vitamins-Minerals (CertaVite/Antioxidants) TABS  6/8/23   Historical Provider, MD   ofloxacin (OCUFLOX) 0.3 % ophthalmic solution 1 drop if needed    Historical Provider, MD   potassium chloride (K-DUR,KLOR-CON) 20 mEq tablet Take 1 tablet (20 mEq total) by mouth daily  Patient not taking: Reported on 9/1/2023 2/14/23   Monica Wick MD   risperiDONE (RisperDAL) 2 mg tablet  6/16/23 9/1/23  Historical Provider, MD   traZODone (DESYREL) 100 mg tablet Take 100 mg by mouth daily at bedtime  Patient not taking: Reported on 2/14/2023 9/1/23  Historical Provider, MD     I have reviewed home medications with patient personally. Allergies: Allergies   Allergen Reactions   • Inderal [Propranolol] Shortness Of Breath   • Trileptal [Oxcarbazepine] Other (See Comments)     Unknown; per care taker from group home    • Catapres [Clonidine Hcl]    • Other      Nuts         Social History:  Marital Status: Single   Occupation: Not working  Patient Pre-hospital Living [address]  Patient Pre-hospital Level of Mobility: walks  Patient Pre-hospital Diet Restrictions: choking hazard  Substance Use History:   Social History     Substance and Sexual Activity   Alcohol Use No     Social History     Tobacco Use   Smoking Status Never   Smokeless Tobacco Never     Social History     Substance and Sexual Activity   Drug Use No       Family History:  Family History   Problem Relation Age of Onset   • No Known Problems Mother    • No Known Problems Father        Physical Exam:     Vitals:   Blood Pressure: (!) 163/116 (09/01/23 1939)  Pulse: (!) 120 (09/01/23 1939)  Temperature: 97.7 °F (36.5 °C) (09/01/23 1146)  Temp Source: Oral (09/01/23 1502)  Respirations: 20 (09/01/23 1502)  SpO2: 100 % (09/01/23 1939)    Physical Exam  Constitutional:       Appearance: Normal appearance.       Comments: Limited examination  Patient was following commands   HENT:      Head:      Comments: Scar tissue noted on the back of the head from the childhood traumatic injury Mouth/Throat:      Mouth: Mucous membranes are moist.   Eyes:      Extraocular Movements: Extraocular movements intact. Conjunctiva/sclera: Conjunctivae normal.      Pupils: Pupils are equal, round, and reactive to light. Cardiovascular:      Rate and Rhythm: Normal rate and regular rhythm. Pulses: Normal pulses. Heart sounds: Normal heart sounds. Pulmonary:      Effort: Pulmonary effort is normal.      Breath sounds: Normal breath sounds. Abdominal:      General: Abdomen is flat. Palpations: Abdomen is soft. Musculoskeletal:         General: Normal range of motion. Cervical back: Normal range of motion and neck supple. Skin:     Capillary Refill: Capillary refill takes less than 2 seconds. Neurological:      Mental Status: She is alert. Gait: Gait abnormal.   Psychiatric:      Comments: Patient followed commands  But patient is nonverbal          Additional Data:     Lab Results:  Results from last 7 days   Lab Units 09/01/23  1656   WBC Thousand/uL 4.82   HEMOGLOBIN g/dL 13.3   HEMATOCRIT % 41.8   PLATELETS Thousands/uL 278   NEUTROS PCT % 52   LYMPHS PCT % 31   MONOS PCT % 12   EOS PCT % 4     Results from last 7 days   Lab Units 09/01/23  1217   SODIUM mmol/L 137   POTASSIUM mmol/L 3.5   CHLORIDE mmol/L 106   CO2 mmol/L 25   BUN mg/dL 16   CREATININE mg/dL 1.13   ANION GAP mmol/L 6   CALCIUM mg/dL 9.7   ALBUMIN g/dL 4.0   TOTAL BILIRUBIN mg/dL 0.30   ALK PHOS U/L 64   ALT U/L 32   AST U/L 35   GLUCOSE RANDOM mg/dL 110                       Lines/Drains:  Invasive Devices     Peripheral Intravenous Line  Duration           Peripheral IV 09/01/23 Left Antecubital <1 day                    Imaging: None  No orders to display       EKG and Other Studies Reviewed on Admission:   · EKG: no ekg done    ** Please Note: This note has been constructed using a voice recognition system.  **

## 2023-09-02 NOTE — ASSESSMENT & PLAN NOTE
When inquired about the diet, caregiver reported concerns for choking  She is given puréed diet or foods cut in quarters at the facility  Plan  N.p.o. for now  Speech and swallow evaluation  IV fluids

## 2023-09-02 NOTE — ASSESSMENT & PLAN NOTE
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 ·   No provoking factors identified (no missed medication doses, no recent fevers, infections, change in bowel/bladder, recent falls or head injuries, recent sleep deprivation). · Patient not noted to have any incontinence after the seizures  · Patient return to baseline after the seizure  · Patient last seizure was a provoked seizure in February 2023 to be due to sleep deprivation  • MR brain scan w/o contrast, Care Everywhere (2014): Unremarkable except limited exam of the pituitary gland. • [address] (2020): Normal  • Last EEG was unrevealing for source and no focal discharges noted. There was generalized encephalopathy. "-Per neurology note 8/2023. • Patient follows up with outpatient neurology (Dr. Brandy Julien)   • Per neurology note-MRI in 2008 normal, EEG unrevealing for source with generalized encephalopathy  • The ED vitals stable, CBC, BMP, lithium levels and carbamazepine levels within normal limits  • EKG normal  • Neurology was consulted    Plan  Follow neurology recommendations  Continue carbamazepine 100 mg twice daily  Continue patient's home medications including lithium  • For generalized convulsions concerning for seizure >2mins: Ativan 0.05mg/kg (max 4 mg) IV, if no response, repeat in 3-5 minutes (max dose 0.1 mg/kg). AED load: Levetiracetam bolus (60 mg/kg, max 4500 mg). Stat BMP.   • Follow-up urine analysis, LFTs, RSV/Covid  • No need for head imaging at this time per neurology  • If no provocation is found, neurology team will likely increase CBZ dose to 500mg BID.   • Seizure precautions

## 2023-09-02 NOTE — PROGRESS NOTES
Neurology Progress note    Name: Kate Ponce   Age & Sex: 46 y.o. female   MRN: [id number]  Unit/Bed#: S -01   Encounter: [id number]    Kate Ponce will need follow up in in 6 weeks with epilepsy attending/AP. She will require BMP, CBZ trough level in 2 weeks. Pending for discharge: none     Assessment plan:    Breakthrough seizures in setting of underlying epilepsy, unprovoked  Assessment & Plan  46 y.o. 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  Prior to seizures today, patient was in her usual state of health. No provoking factors identified (no missed medication doses, no recent fevers, infections, change in bowel/bladder, recent falls or head injuries, recent sleep deprivation); toxic metabolic work-up unremarkable in the hospital. She returned to her baseline in between the 2 episodes and caregiver confidently reported that the patient was back to her baseline during our evaluation later in the afternoon. Patient's baseline seizure frequency is quite low (last provoked seizure in 2/2023 and seizure-free for years prior to that). Semiology today suggests that the patient had a focal onset seizure with secondary generalization, which may be different from the semiology of her prior seizures as documented in outpatient neurology notes. The breakthrough seizures today may very well represent progression of her epilepsy syndrome. As such, will increase CBZ XR dose. Reviewed prior work-up:  · MR brain scan w/o contrast, Care Everywhere (2014): Unremarkable except limited exam of the pituitary gland. · [address] (2020): Normal  · "Last EEG was unrevealing for source and no focal discharges noted. There was generalized encephalopathy. "-Per neurology note 8/2023. Workup so far 09/01/23:   Patient is afebrile.   /79, saturating 100% on room air. BMP unremarkable, LFTs WNL, CBC unremarkable, CBZ level therapeutic (7.6). Lithium 0.7. COVID/FLU/RSV negative       Plan:  · Increase CBZ dose gradually: CBZ XR 500mg in the morning, 400mg at night for 1 week (9/2-9/9). Then 500mg in the morning and evening (9/10 onwards indefinitely). · Counseled caregiver regarding side effects to watch out for given increased dose: Ataxia, sedation. · Check BMP, trough CBZ level outpatient in 2weeks. · Continue rest of her medications including clonazepam, risperidone unchanged. · No need for head imaging. · Will need follow-up with epilepsy team outpatient in 6 to 8 weeks after discharge. · Patient should continue to follow-up with her PCP, other providers regularly as prior scheduled. Subjective:  Patient seen and examined at bedside. No significant events overnight. Patient currently denies CP, SOB, palpitations, abdominal pain, nausea vomiting, fever, chills, headache, dizziness, new onset numbness/ tingling, new onset weakness, change in bowel/ bladder, difficulty speaking, difficulty swallowing, new vision changes. Discussed current clinical status and plan with patient- verbalizes understanding. Review of Systems  ROS -see above. Objective:    Patient ID: Vanessa Bernal is a 46 y.o. female. Vitals:    09/01/23 1939 09/01/23 2209 09/02/23 0740 09/02/23 0831   BP: (!) 163/116 149/96 130/82    BP Location:   Right arm    Pulse: (!) 120 (!) 106 78    Resp:   19    Temp:       TempSrc:       SpO2: 100%  99%    Height:    5' 4" (1.626 m)      Temperature:   No data recorded. Temperature: 97.7 °F (36.5 °C)     Physical Exam:   Vitals reviewed  General Examination: No distress, cooperative. Non verbal but communicates via hand gestures and looking towards the examiner and her caretaker. Smiles occasionally. CVS: S1, S2 noted. Regular rate, rhythm. Pulm: Normal effort. HEENT: Chronic injury in occipital region noted.  No oozing/bleeding. Non tender to touch. Drowsy. Followed some simple commands (baseline). Baseline dysconjugate gaze w/ exotropia. EOM's grossly intact in all directions. No nystagmus. Face symmetric. No dysarthria. Uvula midline. Tongue midline. Appropriate antigravity strength in all 4 limbs. Symmetric LT sensation t/o. Reflexes: +2, and symmetric (biceps, brachioradialis, triceps, patella). Absent Kennedy's b/l. Gait deferred       Labs: I have personally reviewed pertinent reports. Results from last 7 days   Lab Units 09/02/23  1100 09/01/23  1656   WBC Thousand/uL 6.25 4.82   HEMOGLOBIN g/dL 12.2 13.3   HEMATOCRIT % 38.4 41.8   PLATELETS Thousands/uL 279 278   NEUTROS PCT %  --  52   MONOS PCT %  --  12   EOS PCT %  --  4      Results from last 7 days   Lab Units 09/02/23  1058 09/01/23  1217   SODIUM mmol/L 138 137   POTASSIUM mmol/L 5.1 3.5   CHLORIDE mmol/L 106 106   CO2 mmol/L 24 25   BUN mg/dL 15 16   CREATININE mg/dL 0.95 1.13   CALCIUM mg/dL 8.6 9.7   ALK PHOS U/L  --  64   ALT U/L  --  32   AST U/L  --  35     Results from last 7 days   Lab Units 09/02/23  1100   MAGNESIUM mg/dL 2.0                          Imaging:     Radiology Results: I have personally reviewed pertinent reports. No orders to display       Other Diagnostic Testing: I have personally reviewed pertinent reports. and I have personally reviewed pertinent films in PACS    Active Medications:     No current facility-administered medications for this encounter. Prior to Admission medications    Medication Sig Start Date End Date Taking?  Authorizing Provider   acetaminophen (TYLENOL) 500 mg tablet Take 500 mg by mouth every 4 (four) hours as needed for mild pain   Yes Historical Provider, MD   ammonium lactate (LAC-HYDRIN) 12 % cream Apply 1 application topically 2 (two) times a day   Yes Historical Provider, MD   B Complex Vitamins (VITAMIN B COMPLEX PO) Take 1 tablet by mouth 2 (two) times a day   Yes Historical Provider, MD   benztropine (COGENTIN) 1 mg tablet Take 1 mg by mouth 2 (two) times a day   Yes Historical Provider, MD   betamethasone dipropionate (DIPROSONE) 0.05 % cream Apply 1 application topically 2 (two) times a day   Yes Historical Provider, MD   busPIRone (BUSPAR) 15 mg tablet Take 30 mg by mouth 2 (two) times a day   Yes Historical Provider, MD   Calcium Carb-Cholecalciferol (OYSCO 500 + D) 500 mg-200 units per tablet 2 (two) times a day   Yes Historical Provider, MD   carBAMazepine (TEGretol XR) 200 mg 12 hr tablet TAKE TWO TABLETS (400MG) BY MOUTH TWICE DAILY AT 8AM AND 2PM FOR SEIZURES/MOOD 6/20/23  Yes Cindy Nunn MD   chlorhexidine (PERIDEX) 0.12 % solution Apply 15 mL to the mouth or throat 2 (two) times a day   Yes Historical Provider, MD   chlorproMAZINE (THORAZINE) 50 mg tablet Take 50 mg by mouth 3 (three) times a day     Yes Historical Provider, MD   clonazePAM (KlonoPIN) 0.5 mg tablet Take 0.5 mg by mouth Takes . 5 mg tablet at 8 am and 2 pm and tane 2 mg tablets at 8 pm   Yes Historical Provider, MD   diazepam (VALIUM) 2 mg tablet Take 2 mg by mouth every 8 (eight) hours as needed for anxiety   Yes Historical Provider, MD   fexofenadine (ALLEGRA) 180 MG tablet Take 180 mg by mouth daily   Yes Historical Provider, MD   fluticasone (FLONASE) 50 mcg/act nasal spray 1 spray into each nostril daily   Yes Historical Provider, MD   hydrOXYzine HCL (ATARAX) 25 mg tablet Take 25 mg by mouth 3 (three) times a day 12/13/22  Yes Historical Provider, MD   lithium carbonate 300 mg capsule Take 300 mg by mouth 2 (two) times a day with meals   Yes Historical Provider, MD   metoprolol tartrate (LOPRESSOR) 50 mg tablet Take 50 mg by mouth 2 (two) times a day   Yes Historical Provider, MD   montelukast (SINGULAIR) 10 mg tablet Take 10 mg by mouth daily at bedtime   Yes Historical Provider, MD   Multiple Vitamins-Minerals (CERTAVITE/ANTIOXIDANTS PO) Take 1 tablet by mouth daily   Yes Historical Provider, MD   neomycin-bacitracin-polymyxin b (NEOSPORIN) ointment Apply 1 application topically if needed   Yes Historical Provider, MD   olopatadine HCl (PATADAY) 0.2 % opth drops Administer 1 drop to both eyes daily   Yes Historical Provider, MD   Psyllium (METAMUCIL) WAFR Take 1 Wafer by mouth daily   Yes Historical Provider, MD   risperiDONE (RisperDAL M-TABS) 2 mg dispersible tablet Take 1 mg by mouth 3 (three) times a day 8AM, 2PM, 8PM   Yes Historical Provider, MD   Sunscreens (CHAPSTICK SUNBLOCK 15 EX) Apply topically if needed   Yes Historical Provider, MD   Sunscreens ([address]) Apply topically if needed   Yes Historical Provider, MD   busPIRone (BUSPAR) 30 MG tablet  7/5/23   Historical Provider, MD   chlorproMAZINE (THORAZINE) 100 mg tablet  7/5/23   Historical Provider, MD   clonazePAM (KlonoPIN) 2 mg tablet  6/14/23   Historical Provider, MD   Cold Sore Products (HERPECIN-L EX) Apply topically if needed    Historical Provider, MD   medroxyPROGESTERone (DEPO-PROVERA) 150 mg/mL injection Inject 1 mL (150 mg total) into a muscle every 3 (three) months 7/26/22   Kathy Montemayor MD   medroxyPROGESTERone (DEPO-PROVERA) 150 mg/mL injection Inject 1 mL (150 mg total) into a muscle every 3 (three) months 7/6/23   Kathy Montemayor MD   medroxyPROGESTERone acetate (DEPO-PROVERA SYRINGE) 150 mg/mL injection  4/11/23   Historical Provider, MD   Misc.  Devices (T[place]hette Bite Block) MISC Use 2 (two) times a day    Historical Provider, MD   Multiple Vitamins-Minerals (CertaVite/Antioxidants) TABS  6/8/23   Historical Provider, MD   ofloxacin (OCUFLOX) 0.3 % ophthalmic solution 1 drop if needed    Historical Provider, MD   potassium chloride (K-DUR,KLOR-CON) 20 mEq tablet Take 1 tablet (20 mEq total) by mouth daily  Patient not taking: Reported on 9/1/2023 2/14/23   Duc Garcia MD     ==  Benjie Vaca M.D.  PGY-3, Neurology

## 2023-09-02 NOTE — PLAN OF CARE
Problem: PAIN - ADULT  Goal: Verbalizes/displays adequate comfort level or baseline comfort level  Description: Interventions:  - Encourage patient to monitor pain and request assistance  - Assess pain using appropriate pain scale  - Administer analgesics based on type and severity of pain and evaluate response  - Implement non-pharmacological measures as appropriate and evaluate response  - Consider cultural and social influences on pain and pain management  - Notify physician/advanced practitioner if interventions unsuccessful or patient reports new pain  Outcome: Progressing     Problem: INFECTION - ADULT  Goal: Absence or prevention of progression during hospitalization  Description: INTERVENTIONS:  - Assess and monitor for signs and symptoms of infection  - Monitor lab/diagnostic results  - Monitor all insertion sites, i.e. indwelling lines, tubes, and drains  - Monitor endotracheal if appropriate and nasal secretions for changes in amount and color  - [place] appropriate cooling/warming therapies per order  - Administer medications as ordered  - Instruct and encourage patient and family to use good hand hygiene technique  - Identify and instruct in appropriate isolation precautions for identified infection/condition  Outcome: Progressing  Goal: Absence of fever/infection during neutropenic period  Description: INTERVENTIONS:  - Monitor WBC    Outcome: Progressing     Problem: SAFETY ADULT  Goal: Patient will remain free of falls  Description: INTERVENTIONS:  - Educate patient/family on patient safety including physical limitations  - Instruct patient to call for assistance with activity   - Consult OT/PT to assist with strengthening/mobility   - Keep Call bell within reach  - Keep bed low and locked with side rails adjusted as appropriate  - Keep care items and personal belongings within reach  - Initiate and maintain comfort rounds  - Make Fall Risk Sign visible to staff  - Offer Toileting every  Hours, in advance of need  - Initiate/Maintain alarm  - Obtain necessary fall risk management equipment:   - Apply yellow socks and bracelet for high fall risk patients  - Consider moving patient to room near nurses station  Outcome: Progressing  Goal: Maintain or return to baseline ADL function  Description: INTERVENTIONS:  -  Assess patient's ability to carry out ADLs; assess patient's baseline for ADL function and identify physical deficits which impact ability to perform ADLs (bathing, care of mouth/teeth, toileting, grooming, dressing, etc.)  - Assess/evaluate cause of self-care deficits   - Assess range of motion  - Assess patient's mobility; develop plan if impaired  - Assess patient's need for assistive devices and provide as appropriate  - Encourage maximum independence but intervene and supervise when necessary  - Involve family in performance of ADLs  - Assess for home care needs following discharge   - Consider OT consult to assist with ADL evaluation and planning for discharge  - Provide patient education as appropriate  Outcome: Progressing  Goal: Maintains/Returns to pre admission functional level  Description: INTERVENTIONS:  - Perform BMAT or MOVE assessment daily.   - Set and communicate daily mobility goal to care team and patient/family/caregiver. - Collaborate with rehabilitation services on mobility goals if consulted  - Perform Range of Motion  times a day. - Reposition patient every  hours.   - Dangle patient  times a day  - Stand patient  times a day  - Ambulate patient  times a day  - Out of bed to chair  times a day   - Out of bed for meals times a day  - Out of bed for toileting  - Record patient progress and toleration of activity level   Outcome: Progressing     Problem: DISCHARGE PLANNING  Goal: Discharge to home or other facility with appropriate resources  Description: INTERVENTIONS:  - Identify barriers to discharge w/patient and caregiver  - Arrange for needed discharge resources and transportation as appropriate  - Identify discharge learning needs (meds, wound care, etc.)  - Arrange for interpretive services to assist at discharge as needed  - Refer to Case Management Department for coordinating discharge planning if the patient needs post-hospital services based on physician/advanced practitioner order or complex needs related to functional status, cognitive ability, or social support system  Outcome: Progressing     Problem: Knowledge Deficit  Goal: Patient/family/caregiver demonstrates understanding of disease process, treatment plan, medications, and discharge instructions  Description: Complete learning assessment and assess knowledge base.   Interventions:  - Provide teaching at level of understanding  - Provide teaching via preferred learning methods  Outcome: Progressing

## 2023-09-02 NOTE — ASSESSMENT & PLAN NOTE
Lab Results   Component Value Date    EGFR 56 09/01/2023    EGFR 54 (L) 07/21/2023    EGFR 56 (L) 11/01/2022    CREATININE 1.13 09/01/2023    CREATININE 1.22 (H) 07/21/2023    CREATININE 1.19 (H) 11/01/2022   Patient has history of CKD    Creatinine of 1.2-1.3  Today patient's creatinine level normal at 1.13  Avoid hypotension  Avoid nephrotoxic medications

## 2023-09-02 NOTE — ASSESSMENT & PLAN NOTE
Patient has history of hypertension  On admission blood pressure at baseline  However blood pressure was noted to be high after the seizure episode  Plan  · Continue to monitor blood pressure for now  · Continue home metoprolol

## 2023-09-02 NOTE — NURSING NOTE
Patient was ambulating in room and at baseline mentation after being brought up from the ED. Received in report patient had experienced one seizure in the ED. Assisted back to bed by nurse and home care staff. Witnessed focal seizure occurred lasting approx. 15 seconds and self resolved. Patient returned to baseline shortly after. Unable to get an accurate set of vitals due to baseline movements. Admitting SLIM provider was made aware and is at bedside.

## 2023-09-04 NOTE — ED ATTENDING ATTESTATION
9/1/2023  IMarni MD, saw and evaluated the patient. I have discussed the patient with the resident/non-physician practitioner and agree with the resident's/non-physician practitioner's findings, Plan of Care, and MDM as documented in the resident's/non-physician practitioner's note, except where noted. All available labs and Radiology studies were reviewed. I was present for key portions of any procedure(s) performed by the resident/non-physician practitioner and I was immediately available to provide assistance. At this point I agree with the current assessment done in the Emergency Department.   I have conducted an independent evaluation of this patient a history and physical is as follows:see h and p above     ED Course  ED Course as of 09/04/23 0802   Fri Sep 01, 2023   1312 - er md note neuro on call tiger texted -- will come down to see pt- consult placed    1314 Er md note- pt  had an event in er- with initial staring then  brief r sided clonic type activity  which lasted about 1 minute- with very brief post ictal period- now back at baseline  interacting with     1332 Er md note- pt- re-eval again by er md- currently sitting in bed interacting at baseline-- pending neuro consult   1518 Er md note- er md called lab- will  have lab release lithium level    1520 Er md note- pt- re-eval - walking around in room - pt seen by neurology await recommendations    [phone] Er md note-  lab tiger texted back - lithium machine is currently  getting serviced- will place results in when can         Critical Care Time  Procedures

## 2023-09-04 NOTE — ED PROCEDURE NOTE
PROCEDURE  CriticalCare Time    Date/Time: 9/1/2023 11:30 AM    Performed by: Brandin Mckeon MD  Authorized by: Brandin Mckeon MD    Critical care provider statement:     Critical care time (minutes):  35    Critical care start time:  9/1/2023 1:00 PM    Critical care end time:  9/1/2023 1:35 PM    Critical care time was exclusive of:  Separately billable procedures and treating other patients and teaching time    Critical care was necessary to treat or prevent imminent or life-threatening deterioration of the following conditions:  CNS failure or compromise (seizures )    Critical care was time spent personally by me on the following activities:  Examination of patient, discussions with consultants, evaluation of patient's response to treatment, development of treatment plan with patient or surrogate, ordering and performing treatments and interventions, ordering and review of laboratory studies, ordering and review of radiographic studies, re-evaluation of patient's condition and review of old charts    I assumed direction of critical care for this patient from another provider in my specialty: no    Comments:      david pt re-evaluations and assessments- discussion with neurology          Brandin Mckeon MD  09/04/23 [id number]

## 2023-09-05 NOTE — TELEPHONE ENCOUNTER
Dedrick Dye from [facility] called in again asking about a f/u appt for pt.    Please assist.

## 2023-09-07 NOTE — TELEPHONE ENCOUNTER
Spoke to staff. Agreeable to 9/13/2023 appt. Added to schedule for 130. SAP completed and faxed as requested.

## 2023-09-07 NOTE — TELEPHONE ENCOUNTER
Tracy Jain MD  You 2 days ago     Standard SAP is ok. I can see her on 9/13 at [address] during admin time.

## 2023-09-13 ENCOUNTER — OFFICE VISIT (OUTPATIENT)
Dept: NEUROLOGY | Facility: CLINIC | Age: 48
End: 2023-09-13
Payer: MEDICARE

## 2023-09-13 VITALS
HEART RATE: 65 BPM | WEIGHT: 160.2 LBS | SYSTOLIC BLOOD PRESSURE: 138 MMHG | DIASTOLIC BLOOD PRESSURE: 84 MMHG | OXYGEN SATURATION: 95 % | HEIGHT: 64 IN | BODY MASS INDEX: 27.35 KG/M2 | TEMPERATURE: 98.2 F

## 2023-09-13 DIAGNOSIS — G40.209 PARTIAL SYMPTOMATIC EPILEPSY WITH COMPLEX PARTIAL SEIZURES, NOT INTRACTABLE, WITHOUT STATUS EPILEPTICUS (HCC): ICD-10-CM

## 2023-09-13 DIAGNOSIS — Z30.42 ENCOUNTER FOR SURVEILLANCE OF INJECTABLE CONTRACEPTIVE: ICD-10-CM

## 2023-09-13 DIAGNOSIS — G40.909 EPILEPSY (HCC): ICD-10-CM

## 2023-09-13 PROCEDURE — 99214 OFFICE O/P EST MOD 30 MIN: CPT | Performed by: PSYCHIATRY & NEUROLOGY

## 2023-09-13 RX ORDER — MEDROXYPROGESTERONE ACETATE 150 MG/ML
INJECTION, SUSPENSION INTRAMUSCULAR
Qty: 1 ML | Refills: 0 | Status: SHIPPED | OUTPATIENT
Start: 2023-09-13

## 2023-09-13 RX ORDER — CARBAMAZEPINE 100 MG/1
100 TABLET, EXTENDED RELEASE ORAL EVERY 12 HOURS
Qty: 60 TABLET | Refills: 11 | Status: SHIPPED | OUTPATIENT
Start: 2023-09-13

## 2023-09-13 RX ORDER — CARBAMAZEPINE 400 MG/1
400 TABLET, EXTENDED RELEASE ORAL EVERY 12 HOURS
Qty: 60 TABLET | Refills: 11 | Status: SHIPPED | OUTPATIENT
Start: 2023-09-13

## 2023-09-13 NOTE — PROGRESS NOTES
[facility] Neurology [address]  Follow Up Visit    Impression/Plan    Ms. Anahy Bryson is a 46 y.o. female with epilepsy, likely focal due to childhood injury, but most recent EEG (slowing) and MRI unrevealing. Her history includes PDD and MR. Seizures had been well controlled, with seizure freedom for greater than 7 years. Donald Miles was another seizure on 9/1 without provocation and carbamazepine was increased in response. She was taken off topiramate in 2020 due to problematic side effects.  Consider adding new ASM if there are additional unprovoked seizures. Medication that provides mood stabilization may be preferred. Patient Instructions   1. Change carbamazepine to 8 am and 8 pm (500 mg twice daily). 2. Have carbamazepine level and BMP collected in about 2 weeks. 3. Administer nasal midazolam for seizure greater than 5 minutes or cluster of seizures. May repeat once in the other nostril after 10 minutes if needed. 4. Return in about 4 months (already scheduled). Diagnoses and all orders for this visit:    Epilepsy ([address])  -     carBAMazepine (TEGretol XR) 100 mg 12 hr tablet; Take 1 tablet (100 mg total) by mouth every 12 (twelve) hours 8 am and 8 pm (total dose: 500 mg twice daily)    Partial symptomatic epilepsy with complex partial seizures, not intractable, without status epilepticus (HCC)  -     carBAMazepine (TEGretol XR) 400 mg 12 hr tablet; Take 1 tablet (400 mg total) by mouth every 12 (twelve) hours 8 am and 8 pm (total dose: 500 mg twice daily)  -     Midazolam 5 MG/0.1ML SOLN; For seizure longer than 5 min or cluster of seizures. Give 1 spray (5 mg) into 1 nostril; if no response, a second dose of 1 spray (5 mg) into the other nostril may be given 10 min. -     Carbamazepine level, total; Future  -     Basic metabolic panel; Future        Subjective    Debria Arcelia is returning to the [facility] for follow up.      Interval Events:   Seizures since last visit: 9/1 seizures (prior: 2/2023 in setting of sleep deprivation, 7 years prior)  Hospitalizations: yes - 9/1    2 unprovoked seizures on 9/1. One typical at facility and then one in ED. Carbamazepine increased from 400 mg bid to 500 mg bid. Carbamazepine level was 7.6. No evidence of any side effects from carbamazepine increase since coming back from the hospital.  Gait and behavior stable. Current AEDs:  Carbamazepine 500 mg bid (currently taking 500 mg morning and 400 mg at 2 PM and 100 mg at night)  Medication side effects: None  Medication adherence: Yes    Event/Seizure semiology:  Violent shaking with foaming at the mouth that can be preceded by a loud scream     Special Features  Status epilepticus:  Not known  Self Injury Seizures:  None known  Precipitating Factors: Stress     Epilepsy Risk Factors:  Intellectual disability  Head injury (moderate/severe)     Prior AEDs:  Topiramate stopped 2020 in setting of RTA     Prior Evaluation:  MRI in 2008 was read as normal. Dr. Noreen Villeda last note indicates that her last EEG was unrevealing for source and no focal discharges noted. There was generalized encephalopathy.     Prior MRI brain or EEG reports not available in [facility] or Care Everywhere for review.       Psychiatric History:  Followed by psychiatry, multiple psychiatric medications. Behavioral outbursts.      Social History:   Driving: No  Lives Alone: No  Occupation: on permanent disability      Objective    /84 (BP Location: Right arm, Patient Position: Sitting, Cuff Size: Adult)   Pulse 65   Temp 98.2 °F (36.8 °C) (Temporal)   Ht 5' 4" (1.626 m)   Wt 72.7 kg (160 lb 3.2 oz)   SpO2 95%   BMI 27.50 kg/m²      General Exam  No acute distress. Neurologic Exam  Mental Status: Alert. Interactive. Intermittent lab vocalizations, but no words. Gives 5 quickly. Redirected relatively easily when manipulating objects in the room.   Opens the door to leave the room when it is time to go.  Cranial Nerves: Face symmetric. No dysarthria. Motor: Moves all 4 extremities well without evidence of asymmetry or weakness. Coordination: No ataxia when giving 5 with the right hand. Gait: Slightly wide-based, relatively steady, walks fast.    I have spent a total time of 30 minutes on 09/13/23 in caring for this patient including Diagnostic results, Instructions for management, Patient and family education, Importance of tx compliance, Risk factor reductions, Impressions, Counseling / Coordination of care, Documenting in the medical record, Reviewing / ordering tests, medicine, procedures   and Obtaining or reviewing history  .

## 2023-09-13 NOTE — PROGRESS NOTES
Review of Systems   Constitutional: Negative. HENT: Negative. Eyes: Negative. Respiratory: Negative. Cardiovascular: Negative. Gastrointestinal: Negative. Endocrine: Negative. Genitourinary: Negative. Musculoskeletal: Negative. Skin: Negative. Allergic/Immunologic: Negative. Neurological: Positive for seizures. Hematological: Negative. Psychiatric/Behavioral: Negative. All other systems reviewed and are negative.

## 2023-09-13 NOTE — PATIENT INSTRUCTIONS
Change carbamazepine to 8 am and 8 pm (500 mg twice daily). Have carbamazepine level and BMP collected in about 2 weeks. Administer nasal midazolam for seizure greater than 5 minutes or cluster of seizures. May repeat once in the other nostril after 10 minutes if needed. Return in about 4 months (already scheduled).

## 2023-09-22 ENCOUNTER — CLINICAL SUPPORT (OUTPATIENT)
Dept: OBGYN CLINIC | Facility: CLINIC | Age: 48
End: 2023-09-22
Payer: MEDICARE

## 2023-09-22 ENCOUNTER — TELEPHONE (OUTPATIENT)
Dept: NEUROLOGY | Facility: CLINIC | Age: 48
End: 2023-09-22

## 2023-09-22 VITALS — WEIGHT: 161 LBS | HEIGHT: 64 IN | BODY MASS INDEX: 27.49 KG/M2

## 2023-09-22 DIAGNOSIS — Z30.42 ENCOUNTER FOR SURVEILLANCE OF INJECTABLE CONTRACEPTIVE: Primary | ICD-10-CM

## 2023-09-22 PROCEDURE — 96372 THER/PROPH/DIAG INJ SC/IM: CPT

## 2023-09-22 RX ORDER — CLONAZEPAM 2 MG/1
TABLET ORAL
COMMUNITY
Start: 2023-09-18

## 2023-09-22 RX ORDER — HYDROXYZINE HYDROCHLORIDE 25 MG/1
TABLET, FILM COATED ORAL
COMMUNITY
Start: 2023-09-18

## 2023-09-22 RX ORDER — MEDROXYPROGESTERONE ACETATE 150 MG/ML
150 INJECTION, SUSPENSION INTRAMUSCULAR ONCE
Status: COMPLETED | OUTPATIENT
Start: 2023-09-22 | End: 2023-09-22

## 2023-09-22 RX ADMIN — MEDROXYPROGESTERONE ACETATE 150 MG: 150 INJECTION, SUSPENSION INTRAMUSCULAR at 10:30

## 2023-09-22 NOTE — TELEPHONE ENCOUNTER
received vm from 9:28am-Hi, good morning. My name is Chanell Belle, i'm a pharmacist at [facility]. I am following up in regards to [place], date of birth, [birth year]. We had sent some prior auth paperwork over, back on 9/13 when we received a prescription for nayzilam spray for her. And it's still not going through. So I was calling to see if, if there's a status update on the prior auth, i don't know if it was denied or if it was submitted, the facility was just asking us for an update. So if you gukayce could just give us a call back and let us know its [phone] is the direct line to the pharmacy. Thank you.  Smita.  ------------------------------------------------------  PA request located in Team 1 fax folder  Key-[id number]    Please assist with PA

## 2023-09-26 NOTE — TELEPHONE ENCOUNTER
, Eric Garcia calling to check on the status of the PA for the medication   Midazolam 5 MG/0.1ML SOLN     Caregiver asking due to Licensing standard for the group home     Asking that we contact the Pharmacy concerning this PA with a status     Pharmacy #  [phone]  Caregiver # [phone]

## 2023-09-27 NOTE — TELEPHONE ENCOUNTER
Recd vm 9/26 taken off 9/27  Reese Ellis, pharmacist at [facility] in regards to [facility]4e 1975. We are still waiting on the status of the midazolam spray. Please cb with status as the facility needs this for her seizures.   cb [phone]

## 2023-10-03 ENCOUNTER — OFFICE VISIT (OUTPATIENT)
Dept: URGENT CARE | Facility: CLINIC | Age: 48
End: 2023-10-03
Payer: MEDICARE

## 2023-10-03 VITALS
OXYGEN SATURATION: 100 % | HEART RATE: 80 BPM | SYSTOLIC BLOOD PRESSURE: 132 MMHG | TEMPERATURE: 98 F | DIASTOLIC BLOOD PRESSURE: 98 MMHG | RESPIRATION RATE: 18 BRPM

## 2023-10-03 DIAGNOSIS — H10.32 ACUTE BACTERIAL CONJUNCTIVITIS OF LEFT EYE: Primary | ICD-10-CM

## 2023-10-03 PROCEDURE — 99213 OFFICE O/P EST LOW 20 MIN: CPT | Performed by: PHYSICIAN ASSISTANT

## 2023-10-03 PROCEDURE — G0463 HOSPITAL OUTPT CLINIC VISIT: HCPCS | Performed by: PHYSICIAN ASSISTANT

## 2023-10-03 RX ORDER — GENTAMICIN SULFATE 3 MG/ML
2 SOLUTION/ DROPS OPHTHALMIC 4 TIMES DAILY
Qty: 5 ML | Refills: 0 | Status: SHIPPED | OUTPATIENT
Start: 2023-10-03 | End: 2023-10-13

## 2023-10-03 NOTE — PROGRESS NOTES
[facility]        NAME: Sandhya Godoy is a 46 y.o. female  : 1975    MRN: [id number]  DATE: October 3, 2023  TIME: 12:32 PM    /98   Pulse 80   Temp 98 °F (36.7 °C)   Resp 18   SpO2 100%     Assessment and Plan   Acute bacterial conjunctivitis of left eye [H10.32]  1. Acute bacterial conjunctivitis of left eye  gentamicin (GARAMYCIN) 0.3 % ophthalmic solution            Patient Instructions       Follow up with PCP in 3-5 days. Proceed to  ER if symptoms worsen. Chief Complaint     Chief Complaint   Patient presents with   • Eye Problem     Left eye redness x 2 days           History of Present Illness       Pt with redness to left eye x 2 days, no known injury       Review of Systems   Review of Systems   Constitutional: Negative. HENT: Negative. Eyes: Positive for discharge and redness. Respiratory: Negative. Cardiovascular: Negative. Gastrointestinal: Negative. Endocrine: Negative. Genitourinary: Negative. Musculoskeletal: Negative. Skin: Negative. Allergic/Immunologic: Negative. Neurological: Negative. Hematological: Negative. Psychiatric/Behavioral: Negative. All other systems reviewed and are negative.         Current Medications       Current Outpatient Medications:   •  acetaminophen (TYLENOL) 500 mg tablet, Take 500 mg by mouth every 4 (four) hours as needed for mild pain, Disp: , Rfl:   •  ammonium lactate (LAC-HYDRIN) 12 % cream, Apply 1 application topically 2 (two) times a day, Disp: , Rfl:   •  B Complex Vitamins (VITAMIN B COMPLEX PO), Take 1 tablet by mouth 2 (two) times a day, Disp: , Rfl:   •  benztropine (COGENTIN) 1 mg tablet, Take 1 mg by mouth 2 (two) times a day, Disp: , Rfl:   •  betamethasone dipropionate (DIPROSONE) 0.05 % cream, Apply 1 application topically 2 (two) times a day, Disp: , Rfl:   •  busPIRone (BUSPAR) 30 MG tablet, Take 1 tablet (30 mg total) by mouth 2 (two) times a day, Disp: , Rfl: 0  •  Calcium Carb-Cholecalciferol (OYSCO 500 + D) 500 mg-200 units per tablet, 2 (two) times a day, Disp: , Rfl:   •  carBAMazepine (TEGretol XR) 100 mg 12 hr tablet, Take 1 tablet (100 mg total) by mouth every 12 (twelve) hours 8 am and 8 pm (total dose: 500 mg twice daily), Disp: 60 tablet, Rfl: 11  •  carBAMazepine (TEGretol XR) 400 mg 12 hr tablet, Take 1 tablet (400 mg total) by mouth every 12 (twelve) hours 8 am and 8 pm (total dose: 500 mg twice daily), Disp: 60 tablet, Rfl: 11  •  chlorhexidine (PERIDEX) 0.12 % solution, Apply 15 mL to the mouth or throat 2 (two) times a day, Disp: , Rfl:   •  chlorproMAZINE (THORAZINE) 50 mg tablet, Take 50 mg by mouth 3 (three) times a day  , Disp: , Rfl:   •  clonazePAM (KlonoPIN) 0.5 mg tablet, Take 1 tablet (0.5 mg total) by mouth 3 (three) times a day, Disp: , Rfl: 0  •  clonazePAM (KlonoPIN) 2 mg tablet, , Disp: , Rfl:   •  Cold Sore Products (HERPECIN-L EX), Apply topically if needed, Disp: , Rfl:   •  diazepam (VALIUM) 2 mg tablet, Take 2 mg by mouth every 8 (eight) hours as needed for anxiety, Disp: , Rfl:   •  fexofenadine (ALLEGRA) 180 MG tablet, Take 180 mg by mouth daily, Disp: , Rfl:   •  fluticasone (FLONASE) 50 mcg/act nasal spray, 1 spray into each nostril daily as needed for rhinitis, Disp: , Rfl: 0  •  gentamicin (GARAMYCIN) 0.3 % ophthalmic solution, Administer 2 drops to both eyes 4 (four) times a day for 10 days, Disp: 5 mL, Rfl: 0  •  hydrOXYzine HCL (ATARAX) 25 mg tablet, , Disp: , Rfl:   •  lithium carbonate 300 mg capsule, Take 300 mg by mouth 2 (two) times a day with meals, Disp: , Rfl:   •  medroxyPROGESTERone acetate (DEPO-PROVERA SYRINGE) 150 mg/mL injection, INJECT 1ML (150MG) INTRAMUSCULARLY EVERY THREE MONTHS - GIVEN IN OFFICE, Disp: 1 mL, Rfl: 0  •  metoprolol tartrate (LOPRESSOR) 50 mg tablet, Take 50 mg by mouth 2 (two) times a day, Disp: , Rfl:   •  Midazolam 5 MG/0.1ML SOLN, For seizure longer than 5 min or cluster of seizures.  Give 1 spray (5 mg) into 1 nostril; if no response, a second dose of 1 spray (5 mg) into the other nostril may be given 10 min., Disp: 2 each, Rfl: 1  •  Misc. Devices (Toothette Bite Block) MISC, Use 2 (two) times a day, Disp: , Rfl:   •  montelukast (SINGULAIR) 10 mg tablet, Take 10 mg by mouth daily at bedtime, Disp: , Rfl:   •  Multiple Vitamins-Minerals (CERTAVITE/ANTIOXIDANTS PO), Take 1 tablet by mouth daily, Disp: , Rfl:   •  neomycin-bacitracin-polymyxin b (NEOSPORIN) ointment, Apply 1 application topically if needed, Disp: , Rfl:   •  olopatadine HCl (PATADAY) 0.2 % opth drops, Administer 1 drop to both eyes daily, Disp: , Rfl:   •  Psyllium (METAMUCIL) WAFR, Take 1 Wafer by mouth daily, Disp: , Rfl:   •  risperiDONE (RisperDAL M-TABS) 2 mg dispersible tablet, Take 1 mg by mouth 3 (three) times a day 8AM, 2PM, 8PM, Disp: , Rfl:   •  sodium bicarbonate 650 mg tablet, Take 1 tablet (650 mg total) by mouth in the morning, Disp: , Rfl: 0  •  Sunscreens (CHAPSTICK SUNBLOCK 15 EX), Apply topically if needed, Disp: , Rfl:   •  Sunscreens ([address]), Apply topically if needed, Disp: , Rfl:     Current Allergies     Allergies as of 10/03/2023 - Reviewed 10/03/2023   Allergen Reaction Noted   • Inderal [propranolol] Shortness Of Breath 01/20/2016   • Trileptal [oxcarbazepine] Other (See Comments) 09/01/2023   • Catapres [clonidine hcl]  01/20/2016   • Other  12/13/2016            The following portions of the patient's history were reviewed and updated as appropriate: allergies, current medications, past family history, past medical history, past social history, past surgical history and problem list.     Past Medical History:   Diagnosis Date   • Brain injury ([address])    • Encephalopathy    • Hypertension    • Seizures ([address])        History reviewed. No pertinent surgical history.     Family History   Problem Relation Age of Onset   • No Known Problems Mother    • No Known Problems Father          Medications have been verified. Objective   /98   Pulse 80   Temp 98 °F (36.7 °C)   Resp 18   SpO2 100%        Physical Exam     Physical Exam  Vitals and nursing note reviewed. Constitutional:       Appearance: Normal appearance. She is normal weight. HENT:      Right Ear: Tympanic membrane normal.      Nose: Nose normal.   Eyes:      Extraocular Movements: Extraocular movements intact. Pupils: Pupils are equal, round, and reactive to light. Comments: + red reflex bilat perrl eom wnl  No f/o seen in cornea or under either lid  Left eye medial conj injection  With minro yellow d/c    Cardiovascular:      Rate and Rhythm: Normal rate and regular rhythm. Pulses: Normal pulses. Heart sounds: Normal heart sounds. Pulmonary:      Effort: Pulmonary effort is normal.      Breath sounds: Normal breath sounds. Neurological:      Mental Status: She is alert.

## 2023-10-10 ENCOUNTER — TELEPHONE (OUTPATIENT)
Dept: OBGYN CLINIC | Facility: CLINIC | Age: 48
End: 2023-10-10

## 2023-10-10 NOTE — TELEPHONE ENCOUNTER
Spoke with Avni Garcia  Caregiver of patient they need by Thursday , a paper stating that Brock Andradeizzle only needs to get a  Pap smear every other year  They lost the paperwork , can call her back at [phone]

## 2023-10-11 ENCOUNTER — DOCUMENTATION (OUTPATIENT)
Dept: OBGYN CLINIC | Facility: CLINIC | Age: 48
End: 2023-10-11

## 2023-10-11 NOTE — TELEPHONE ENCOUNTER
[facility] called again to see update. States they need a letter stating the patient is under Dr. Lynsey figueroa and that she gets a pap smear every 2 years. Requested to be faxed at [phone] to the director of the [facility]. Is it ok to write for patient?

## 2023-10-12 ENCOUNTER — ANNUAL EXAM (OUTPATIENT)
Dept: OBGYN CLINIC | Facility: CLINIC | Age: 48
End: 2023-10-12
Payer: MEDICARE

## 2023-10-12 VITALS
DIASTOLIC BLOOD PRESSURE: 86 MMHG | WEIGHT: 160 LBS | HEIGHT: 64 IN | SYSTOLIC BLOOD PRESSURE: 128 MMHG | BODY MASS INDEX: 27.31 KG/M2

## 2023-10-12 DIAGNOSIS — N93.9 ABNORMAL UTERINE BLEEDING (AUB): ICD-10-CM

## 2023-10-12 DIAGNOSIS — Z12.31 SCREENING MAMMOGRAM, ENCOUNTER FOR: ICD-10-CM

## 2023-10-12 DIAGNOSIS — Z01.419 WOMEN'S ANNUAL ROUTINE GYNECOLOGICAL EXAMINATION: Primary | ICD-10-CM

## 2023-10-12 PROCEDURE — G0101 CA SCREEN;PELVIC/BREAST EXAM: HCPCS | Performed by: OBSTETRICS & GYNECOLOGY

## 2023-10-12 RX ORDER — MEDROXYPROGESTERONE ACETATE 150 MG/ML
150 INJECTION, SUSPENSION INTRAMUSCULAR
Qty: 1 ML | Refills: 3 | Status: SHIPPED | OUTPATIENT
Start: 2023-10-12

## 2023-10-12 RX ORDER — CHLORPROMAZINE HYDROCHLORIDE 100 MG/1
TABLET, FILM COATED ORAL
COMMUNITY
Start: 2023-10-09

## 2023-10-12 NOTE — PROGRESS NOTES
Josue Burr is a 46 y.o. female who presents for annual well woman exam.   NO BLEEDING ON depo     History of abnormal Pap smear: no 2020  Family history of uterine or ovarian cancer: no  Family history of breast cancer: no    Menstrual History:  OB History          0    Para   0    Term   0       0    AB   0    Living   0         SAB   0    IAB   0    Ectopic   0    Multiple   0    Live Births   0                  No LMP recorded. (Menstrual status: Birth Control). The following portions of the patient's history were reviewed and updated as appropriate: allergies, current medications, past family history, past medical history, past social history, past surgical history, and problem list.    Review of Systems  Review of Systems   Constitutional:  Negative for activity change, appetite change, chills, fatigue and fever. Respiratory:  Negative for apnea, cough, chest tightness and shortness of breath. Cardiovascular:  Negative for chest pain, palpitations and leg swelling. Gastrointestinal:  Negative for abdominal pain, constipation, diarrhea, nausea and vomiting. Genitourinary:  Negative for difficulty urinating, dysuria, flank pain, frequency, hematuria and urgency. Neurological:  Negative for dizziness, seizures, syncope, light-headedness, numbness and headaches. Psychiatric/Behavioral:  Negative for agitation and confusion.            Objective      /86 (BP Location: Left arm, Patient Position: Sitting, Cuff Size: Adult)   Ht 5' 4" (1.626 m)   Wt 72.6 kg (160 lb)   BMI 27.46 kg/m²     Physical Exam  OBGyn Exam     General:    Patient had Intellectual disability, appears stated age, and cooperative   Heart: regular rate and rhythm, S1, S2 normal, no murmur, click, rub or gallop   Lungs: clear to auscultation bilaterally   Abdomen: soft, non-tender, without masses or organomegaly               Uterus:    Adnexa:  Breast Exam:    breasts appear normal, no suspicious masses, no skin or nipple changes or axillary nodes. Patient last Pap smear in 2020 HPV negative and Pap negative patient is not sexually active no pelvic exam was done today secondary to mental status will consider repeat Pap and HPV in 2025          Assessment      @well woman@ . 52-year-old female  Had brain injury with encephalopathy  Chronic hypertension  Seizure disorder  Taking Depo-Provera secondary to dysmenorrhea menorrhagia consider stop after next year  Plan   Pap/HPV negative in 2020  Diet/exercise  Up-to-date with mammogram  Continue Depo-Provera  Calcium/vitamin D  Return to office for Depo-Provera   All questions answered. There are no Patient Instructions on file for this visit.

## 2023-12-08 ENCOUNTER — CLINICAL SUPPORT (OUTPATIENT)
Dept: OBGYN CLINIC | Facility: CLINIC | Age: 48
End: 2023-12-08
Payer: MEDICARE

## 2023-12-08 VITALS — SYSTOLIC BLOOD PRESSURE: 124 MMHG | DIASTOLIC BLOOD PRESSURE: 82 MMHG | WEIGHT: 160.4 LBS | BODY MASS INDEX: 27.53 KG/M2

## 2023-12-08 DIAGNOSIS — Z30.42 ENCOUNTER FOR SURVEILLANCE OF INJECTABLE CONTRACEPTIVE: Primary | ICD-10-CM

## 2023-12-08 PROCEDURE — 96372 THER/PROPH/DIAG INJ SC/IM: CPT

## 2023-12-08 RX ORDER — MEDROXYPROGESTERONE ACETATE 150 MG/ML
150 INJECTION, SUSPENSION INTRAMUSCULAR ONCE
Status: COMPLETED | OUTPATIENT
Start: 2023-12-08 | End: 2023-12-08

## 2023-12-08 RX ORDER — DIAZEPAM 2 MG/1
TABLET ORAL
COMMUNITY
Start: 2023-11-13

## 2023-12-08 RX ADMIN — MEDROXYPROGESTERONE ACETATE 150 MG: 150 INJECTION, SUSPENSION INTRAMUSCULAR at 13:55

## 2024-01-15 ENCOUNTER — OFFICE VISIT (OUTPATIENT)
Dept: NEUROLOGY | Facility: CLINIC | Age: 49
End: 2024-01-15
Payer: COMMERCIAL

## 2024-01-15 VITALS
WEIGHT: 159.4 LBS | DIASTOLIC BLOOD PRESSURE: 86 MMHG | HEART RATE: 65 BPM | HEIGHT: 64 IN | SYSTOLIC BLOOD PRESSURE: 122 MMHG | BODY MASS INDEX: 27.21 KG/M2 | OXYGEN SATURATION: 98 % | TEMPERATURE: 97.6 F

## 2024-01-15 DIAGNOSIS — G40.209 PARTIAL SYMPTOMATIC EPILEPSY WITH COMPLEX PARTIAL SEIZURES, NOT INTRACTABLE, WITHOUT STATUS EPILEPTICUS (HCC): Primary | ICD-10-CM

## 2024-01-15 DIAGNOSIS — G40.909 EPILEPSY (HCC): ICD-10-CM

## 2024-01-15 PROCEDURE — 99213 OFFICE O/P EST LOW 20 MIN: CPT | Performed by: PSYCHIATRY & NEUROLOGY

## 2024-01-15 RX ORDER — CARBAMAZEPINE 100 MG/1
100 TABLET, EXTENDED RELEASE ORAL EVERY 12 HOURS
Qty: 180 TABLET | Refills: 3 | Status: SHIPPED | OUTPATIENT
Start: 2024-01-15

## 2024-01-15 RX ORDER — MIDAZOLAM 5 MG/.1ML
SPRAY NASAL
COMMUNITY

## 2024-01-15 RX ORDER — CARBAMAZEPINE 400 MG/1
400 TABLET, EXTENDED RELEASE ORAL 2 TIMES DAILY
Qty: 180 TABLET | Refills: 3 | Status: SHIPPED | OUTPATIENT
Start: 2024-01-15

## 2024-01-15 RX ORDER — CARBAMAZEPINE 400 MG/1
400 TABLET, EXTENDED RELEASE ORAL 2 TIMES DAILY
COMMUNITY
Start: 2023-12-18 | End: 2024-01-15 | Stop reason: SDUPTHER

## 2024-01-15 NOTE — PATIENT INSTRUCTIONS
Have blood work done in the next few weeks.   Let us  know if there are seizures.   Return in about 6 months (AP).

## 2024-02-01 ENCOUNTER — TELEPHONE (OUTPATIENT)
Dept: NEUROLOGY | Facility: CLINIC | Age: 49
End: 2024-02-01

## 2024-02-01 NOTE — TELEPHONE ENCOUNTER
1/31 9:06    Pt's caregiver let a VM re: appt and lab scripts.    Transcribed VM:   Good morning, I am calling for one of our consumers, Mukund Davidson. Her date of birth is [birth year]. We had staffing changes and I need to find out when her next appointment is and I'm going to need a new script for her blood work. If you could please give me a call back. I would appreciate it. Thank you.    Called back and spoke with caregiver and provider her with appt info and faxed script to # provided, [phone].

## 2024-02-23 ENCOUNTER — CLINICAL SUPPORT (OUTPATIENT)
Dept: OBGYN CLINIC | Facility: CLINIC | Age: 49
End: 2024-02-23
Payer: MEDICARE

## 2024-02-23 VITALS
SYSTOLIC BLOOD PRESSURE: 128 MMHG | HEIGHT: 64 IN | DIASTOLIC BLOOD PRESSURE: 66 MMHG | BODY MASS INDEX: 28 KG/M2 | WEIGHT: 164 LBS

## 2024-02-23 DIAGNOSIS — Z30.42 ENCOUNTER FOR SURVEILLANCE OF INJECTABLE CONTRACEPTIVE: Primary | ICD-10-CM

## 2024-02-23 PROCEDURE — 96372 THER/PROPH/DIAG INJ SC/IM: CPT

## 2024-02-23 RX ORDER — MEDROXYPROGESTERONE ACETATE 150 MG/ML
150 INJECTION, SUSPENSION INTRAMUSCULAR ONCE
Status: COMPLETED | OUTPATIENT
Start: 2024-02-23 | End: 2024-02-23

## 2024-02-23 RX ADMIN — MEDROXYPROGESTERONE ACETATE 150 MG: 150 INJECTION, SUSPENSION INTRAMUSCULAR at 09:32

## 2024-02-29 DIAGNOSIS — G40.209 LOCALIZATION-RELATED (FOCAL) (PARTIAL) SYMPTOMATIC EPILEPSY AND EPILEPTIC SYNDROMES WITH COMPLEX PARTIAL SEIZURES, NOT INTRACTABLE, WITHOUT STATUS EPILEPTICUS (HCC): ICD-10-CM

## 2024-02-29 RX ORDER — MIDAZOLAM 5 MG/.1ML
SPRAY NASAL
Qty: 2 EACH | Refills: 1 | Status: SHIPPED | OUTPATIENT
Start: 2024-02-29

## 2024-03-20 ENCOUNTER — TELEPHONE (OUTPATIENT)
Dept: NEUROLOGY | Facility: CLINIC | Age: 49
End: 2024-03-20

## 2024-03-20 ENCOUNTER — TELEPHONE (OUTPATIENT)
Age: 49
End: 2024-03-20

## 2024-03-20 NOTE — TELEPHONE ENCOUNTER
Elizabeth called on behalf of patient for an yearly follow up.     Upon checking chart it was said for patient to be seen by PA in about months, which patient does have an appointment for Sarahi on 7/16/24.    There is a small note that, that appointment needs to be rescheduled, but I was unable to reschedule at this time as Sarahi's schedule is currently not open that far.

## 2024-04-16 ENCOUNTER — TELEPHONE (OUTPATIENT)
Age: 49
End: 2024-04-16

## 2024-04-16 ENCOUNTER — PREP FOR PROCEDURE (OUTPATIENT)
Age: 49
End: 2024-04-16

## 2024-04-16 DIAGNOSIS — Z12.11 SCREENING FOR COLON CANCER: Primary | ICD-10-CM

## 2024-04-16 RX ORDER — BISACODYL 5 MG/1
TABLET, DELAYED RELEASE ORAL
Qty: 2 TABLET | Refills: 0 | Status: SHIPPED | OUTPATIENT
Start: 2024-04-16

## 2024-04-16 RX ORDER — POLYETHYLENE GLYCOL 3350 17 G/17G
POWDER, FOR SOLUTION ORAL
Qty: 238 G | Refills: 0 | Status: SHIPPED | OUTPATIENT
Start: 2024-04-16

## 2024-04-16 NOTE — TELEPHONE ENCOUNTER
Patients GI provider:  N/A    Number to return call: [phone]    Reason for call: Due to patient being in group home, miralax dulcolax prep has to be sent into pharmacy. Please send prep.    Scheduled procedure/appointment date if applicable: procedure 6/24/24

## 2024-04-16 NOTE — TELEPHONE ENCOUNTER
04/16/24  Screened by: Glory Hemphill    Referring Provider     Pre- Screening:     There is no height or weight on file to calculate BMI.  Has patient been referred for a routine screening Colonoscopy? yes  Is the patient between 45-75 years old? yes      Previous Colonoscopy no   If yes:    Date: N/A    Facility: N/A    Reason: N/A          Does the patient want to see a Gastroenterologist prior to their procedure OR are they having any GI symptoms? no    Has the patient been hospitalized or had abdominal surgery in the past 6 months? no    Does the patient use supplemental oxygen? no    Does the patient take Coumadin, Lovenox, Plavix, Elliquis, Xarelto, or other blood thinning medication? no    Has the patient had a stroke, cardiac event, or stent placed in the past year? no        If patient is between 45yrs - 49yrs, please advise patient that we will have to confirm benefits & coverage with their insurance company for a routine screening colonoscopy.

## 2024-04-16 NOTE — TELEPHONE ENCOUNTER
Scheduled date of colonoscopy (as of today):6/24/2024  Physician performing colonoscopy:  Location of colonoscopy: [facility]  Bowel prep reviewed with patient: Glory Hemphill  Instructions reviewed with patient by: Glory Hemphill  Clearances: N/A      Miralax Dulcolax prep sent via [facility]

## 2024-05-13 ENCOUNTER — TELEPHONE (OUTPATIENT)
Dept: NEPHROLOGY | Facility: CLINIC | Age: 49
End: 2024-05-13

## 2024-05-16 ENCOUNTER — OFFICE VISIT (OUTPATIENT)
Dept: AUDIOLOGY | Age: 49
End: 2024-05-16
Payer: MEDICARE

## 2024-05-16 ENCOUNTER — OFFICE VISIT (OUTPATIENT)
Dept: NEPHROLOGY | Facility: CLINIC | Age: 49
End: 2024-05-16
Payer: MEDICARE

## 2024-05-16 VITALS
SYSTOLIC BLOOD PRESSURE: 128 MMHG | HEIGHT: 64 IN | WEIGHT: 168 LBS | DIASTOLIC BLOOD PRESSURE: 88 MMHG | BODY MASS INDEX: 28.68 KG/M2

## 2024-05-16 DIAGNOSIS — E87.8 LOW BICARBONATE LEVEL: ICD-10-CM

## 2024-05-16 DIAGNOSIS — I12.9 BENIGN HYPERTENSION WITH CKD (CHRONIC KIDNEY DISEASE) STAGE III (HCC): Primary | ICD-10-CM

## 2024-05-16 DIAGNOSIS — N18.31 STAGE 3A CHRONIC KIDNEY DISEASE (HCC): ICD-10-CM

## 2024-05-16 DIAGNOSIS — H90.3 SENSORY HEARING LOSS, BILATERAL: Primary | ICD-10-CM

## 2024-05-16 DIAGNOSIS — N18.30 BENIGN HYPERTENSION WITH CKD (CHRONIC KIDNEY DISEASE) STAGE III (HCC): Primary | ICD-10-CM

## 2024-05-16 DIAGNOSIS — E87.6 HYPOKALEMIA: ICD-10-CM

## 2024-05-16 PROCEDURE — 99214 OFFICE O/P EST MOD 30 MIN: CPT | Performed by: INTERNAL MEDICINE

## 2024-05-16 PROCEDURE — 92579 VISUAL AUDIOMETRY (VRA): CPT

## 2024-05-16 PROCEDURE — 92567 TYMPANOMETRY: CPT

## 2024-05-16 NOTE — PROGRESS NOTES
NEPHROLOGY OUTPATIENT PROGRESS NOTE   Mukund Davidson 49 y.o. female MRN: [id number]  DATE: 5/16/2024  Reason for visit:   Chief Complaint   Patient presents with    Follow-up    Chronic Kidney Disease     ASSESSMENT and PLAN:  Chronic kidney disease stage III (baseline serum creatinine 1.3, previous baseline used to be 1.0)  -Last serum creatinine 0.9 in September 2023 stable although no recent labs available since then.  -CKD suspect secondary to long-term hypertension, chronic long-term lithium related nephrotoxicity (on lithium >14 years which can cause chronic interstitial nephritis )  - We'll check BMP now and before next visit.  -UA bland without hematuria or proteinuria in February 2023.   UACR nonsignificant in July 2023.  Check repeat UACR now  -patient is unable to be weaned off lithium due to her multiple significant psych issues as per my prior discussion with psychiatrist. (psychiatrist Dr. Easley Ph - [phone])  -renal ultrasound in February 2019 shows right kidney 10.2 cm, left kidney 9.9 cm, echogenic appearance of right kidney, no hydronephrosis, echogenic appearance in the left kidney     Serum sodium level overall stable at 138 in September 2023.  -she is currently not on any fluid restriction.  -prior 24 hour UO 2.8 L. Difficult to obtain another 24 hr UO and remains very challenging due to psych issues and unable to comply with proper collection to determine if she truly has polyuria.   -prior urine osmolality 239 in February 2019.  -UA in February 2023 shows diluted urine with SG 1.003  -patient is overall stable on multiple psych medications including lithium.  If has worsening hypernatremia or polyuria issues, may need to consider Amiloride.     Prior hypokalemia resolved, last serum potassium 5.1 which was moderately hemolyzed specimen in September 2023.  No repeat BMP available since then.  Check BMP now.  -Magnesium level within normal range     Concern for medullary nephrocalcinosis on  prior renal ultrasound  -no obvious recent episodes of kidney stone flare-up.     Hypertension  -due to patient's developmental disorder, patient does not sit still to be able to check blood pressure accurately.  she remains agitated throughout the entire office visit  -BP acceptable.  -Currently remains on metoprolol.     Mild proteinuria, overall improved, last UACR unremarkable as above.  Repeat UACR now      Low bicarbonate, resolved. continued to remain off Topamax since early 2021.  -Continue to remain off sodium bicarb supplements.     Diagnoses and all orders for this visit:    Benign hypertension with CKD (chronic kidney disease) stage III (HCC)  -     Albumin / creatinine urine ratio  -     Basic metabolic panel  -     CBC  -     Phosphorus  -     PTH, intact  -     Albumin / creatinine urine ratio; Future  -     Basic metabolic panel; Future  -     CBC; Future  -     Phosphorus; Future  -     PTH, intact; Future  -     Basic metabolic panel  -     CBC  -     Phosphorus  -     PTH, intact    Stage 3a chronic kidney disease (HCC)  -     Albumin / creatinine urine ratio  -     Basic metabolic panel  -     CBC  -     Phosphorus  -     PTH, intact  -     Albumin / creatinine urine ratio; Future  -     Basic metabolic panel; Future  -     CBC; Future  -     Phosphorus; Future  -     PTH, intact; Future  -     Basic metabolic panel  -     CBC  -     Phosphorus  -     PTH, intact    Low bicarbonate level    Hypokalemia  -     Basic metabolic panel; Future  -     Basic metabolic panel          SUBJECTIVE / HPI:  Patient is 49-year-old female with significant past medical history of developmental disorder, mental retardation, occipital injury in the past, history of epilepsy, hypertension for at least 12 years, for regular follow-up of CKD. Previous baseline creatinine 1.0 going back to 2013 although her creatinine has been in the range of 1.2-1.3 since January 2016. Most recent serum creatinine stable at baseline.  "She lives at group home. continue to remain off Topamax.  She is unable to provide any history due to underlying intellectual disability.  All history is obtained from reviewing medical records and accompanying caregiver.       Overall no recent change in medications as per caregiver.  She has not done any repeat labs since September 2023.  Unable to comment whether patient has any urinary complaint or not. Patient has been on metoprolol for hypertension.  She is also on long-term chronic lithium, buspirone, chlorpromazine, and benztropine     REVIEW OF SYSTEMS:  Unable to obtain any review of systems due to patient's intellectual disability.    PHYSICAL EXAM:  Vitals:    05/16/24 0802   Weight: 76.2 kg (168 lb)   Height: 5' 4\" (1.626 m)     Body mass index is 28.84 kg/m².    Physical Exam  Constitutional:       Appearance: She is well-developed.   HENT:      Head: Normocephalic and atraumatic.      Right Ear: External ear normal.      Left Ear: External ear normal.   Eyes:      Conjunctiva/sclera: Conjunctivae normal.   Pulmonary:      Effort: Pulmonary effort is normal.      Breath sounds: Normal breath sounds. No wheezing or rales.   Abdominal:      General: There is no distension.      Palpations: Abdomen is soft.      Tenderness: There is no abdominal tenderness.   Musculoskeletal:         General: No deformity.   Lymphadenopathy:      Cervical: No cervical adenopathy.   Skin:     Findings: No rash.   Neurological:      Mental Status: She is alert.      Comments: Active, alert   Psychiatric:      Comments: Underlying intellectual disability, does not answer any questions         PAST MEDICAL HISTORY:  Past Medical History:   Diagnosis Date    Brain injury (HCC)     Encephalopathy     Hypertension     Seizures (HCC)        PAST SURGICAL HISTORY:  History reviewed. No pertinent surgical history.    SOCIAL HISTORY:  Social History     Substance and Sexual Activity   Alcohol Use No     Social History     Substance " and Sexual Activity   Drug Use No     Social History     Tobacco Use   Smoking Status Never   Smokeless Tobacco Never       FAMILY HISTORY:  Family History   Problem Relation Age of Onset    No Known Problems Mother     No Known Problems Father        MEDICATIONS:    Current Outpatient Medications:     medroxyPROGESTERone (DEPO-PROVERA) 150 mg/mL injection, Inject 1 mL (150 mg total) into a muscle every 3 (three) months, Disp: 1 mL, Rfl: 3    metoprolol tartrate (LOPRESSOR) 50 mg tablet, Take 50 mg by mouth 2 (two) times a day, Disp: , Rfl:     montelukast (SINGULAIR) 10 mg tablet, Take 10 mg by mouth daily at bedtime, Disp: , Rfl:     acetaminophen (TYLENOL) 500 mg tablet, Take 500 mg by mouth every 4 (four) hours as needed for mild pain, Disp: , Rfl:     ammonium lactate (LAC-HYDRIN) 12 % cream, Apply 1 application topically 2 (two) times a day, Disp: , Rfl:     B Complex Vitamins (VITAMIN B COMPLEX PO), Take 1 tablet by mouth 2 (two) times a day, Disp: , Rfl:     benztropine (COGENTIN) 1 mg tablet, Take 1 mg by mouth 2 (two) times a day, Disp: , Rfl:     betamethasone dipropionate (DIPROSONE) 0.05 % cream, Apply 1 application topically 2 (two) times a day, Disp: , Rfl:     bisacodyl (DULCOLAX) 5 mg EC tablet, Take as directed as per written office instructions, Disp: 2 tablet, Rfl: 0    busPIRone (BUSPAR) 30 MG tablet, Take 1 tablet (30 mg total) by mouth 2 (two) times a day, Disp: , Rfl: 0    Calcium Carb-Cholecalciferol (OYSCO 500 + D) 500 mg-200 units per tablet, 2 (two) times a day, Disp: , Rfl:     carBAMazepine (TEGretol XR) 100 mg 12 hr tablet, Take 1 tablet (100 mg total) by mouth every 12 (twelve) hours 8 am and 8 pm (total dose: 500 mg twice daily), Disp: 180 tablet, Rfl: 3    carBAMazepine (TEGretol XR) 400 mg 12 hr tablet, Take 1 tablet (400 mg total) by mouth 2 (two) times a day 8 am and 8 pm (total dose: 500 mg twice daily), Disp: 180 tablet, Rfl: 3    chlorhexidine (PERIDEX) 0.12 % solution, Apply  15 mL to the mouth or throat 2 (two) times a day, Disp: , Rfl:     chlorproMAZINE (THORAZINE) 100 mg tablet, , Disp: , Rfl:     clonazePAM (KlonoPIN) 0.5 mg tablet, Take 1 tablet (0.5 mg total) by mouth 3 (three) times a day, Disp: , Rfl: 0    Cold Sore Products (HERPECIN-L EX), Apply topically if needed, Disp: , Rfl:     diazepam (VALIUM) 2 mg tablet, , Disp: , Rfl:     fexofenadine (ALLEGRA) 180 MG tablet, Take 180 mg by mouth daily, Disp: , Rfl:     fluticasone (FLONASE) 50 mcg/act nasal spray, 1 spray into each nostril daily as needed for rhinitis, Disp: , Rfl: 0    hydrOXYzine HCL (ATARAX) 25 mg tablet, , Disp: , Rfl:     lithium carbonate 300 mg capsule, Take 300 mg by mouth 2 (two) times a day with meals, Disp: , Rfl:     Misc. Devices (Toothette Bite Block) MISC, Use 2 (two) times a day, Disp: , Rfl:     Multiple Vitamins-Minerals (CERTAVITE/ANTIOXIDANTS PO), Take 1 tablet by mouth daily, Disp: , Rfl:     Nayzilam 5 MG/0.1ML SOLN, FOR SEIZURE >5 MINUTES OR CLUSTER OF SEIZURES, GIVE ONE SPRAY INTO ONE NOSTRIL. IF NO RESPONSE AFTER 10 MINUTES, MAY GIVE 2ND DOSE OF ONE SPRAY IN OTHER NOSTRIL FOR SEIZURE CONTROL, Disp: 2 each, Rfl: 1    neomycin-bacitracin-polymyxin b (NEOSPORIN) ointment, Apply 1 application topically if needed, Disp: , Rfl:     olopatadine HCl (PATADAY) 0.2 % opth drops, Administer 1 drop to both eyes daily, Disp: , Rfl:     polyethylene glycol (GLYCOLAX) 17 GM/SCOOP powder, Take as directed as per written office instructions, Disp: 238 g, Rfl: 0    Psyllium (METAMUCIL) WAFR, Take 1 Wafer by mouth daily, Disp: , Rfl:     risperiDONE (RisperDAL M-TABS) 2 mg dispersible tablet, Take 1 mg by mouth 3 (three) times a day 8AM, 2PM, 8PM, Disp: , Rfl:     sodium bicarbonate 650 mg tablet, Take 1 tablet (650 mg total) by mouth in the morning, Disp: , Rfl: 0    Sunscreens (CHAPSTICK SUNBLOCK 15 EX), Apply topically if needed, Disp: , Rfl:     Sunscreens (NEUTROGENA SENSITIVE SKIN 60+ EX), Apply  topically if needed, Disp: , Rfl:     Lab Results:   Results for orders placed or performed during the hospital encounter of 09/01/23   COVID/FLU/RSV    Specimen: Nose; Nares   Result Value Ref Range    SARS-CoV-2 Negative Negative    INFLUENZA A PCR Negative Negative    INFLUENZA B PCR Negative Negative    RSV PCR Negative Negative   Lithium level   Result Value Ref Range    Lithium Lvl 0.7 0.6 - 1.2 mmol/L   Carbamazepine level, total   Result Value Ref Range    Carbamazepine Lvl 7.6 4.0 - 12.0 ug/mL   Basic metabolic panel   Result Value Ref Range    Sodium 137 135 - 147 mmol/L    Potassium 3.5 3.5 - 5.3 mmol/L    Chloride 106 96 - 108 mmol/L    CO2 25 21 - 32 mmol/L    ANION GAP 6 mmol/L    BUN 16 5 - 25 mg/dL    Creatinine 1.13 0.60 - 1.30 mg/dL    Glucose 110 65 - 140 mg/dL    Calcium 9.7 8.4 - 10.2 mg/dL    eGFR 56 ml/min/1.73sq m   CBC and differential   Result Value Ref Range    WBC 4.82 4.31 - 10.16 Thousand/uL    RBC 4.26 3.81 - 5.12 Million/uL    Hemoglobin 13.3 11.5 - 15.4 g/dL    Hematocrit 41.8 34.8 - 46.1 %    MCV 98 82 - 98 fL    MCH 31.2 26.8 - 34.3 pg    MCHC 31.8 31.4 - 37.4 g/dL    RDW 12.4 11.6 - 15.1 %    MPV 10.1 8.9 - 12.7 fL    Platelets 278 149 - 390 Thousands/uL    nRBC 0 /100 WBCs    Segmented % 52 43 - 75 %    Immature Grans % 0 0 - 2 %    Lymphocytes % 31 14 - 44 %    Monocytes % 12 4 - 12 %    Eosinophils Relative 4 0 - 6 %    Basophils Relative 1 0 - 1 %    Absolute Neutrophils 2.55 1.85 - 7.62 Thousands/µL    Absolute Immature Grans 0.01 0.00 - 0.20 Thousand/uL    Absolute Lymphocytes 1.47 0.60 - 4.47 Thousands/µL    Absolute Monocytes 0.56 0.17 - 1.22 Thousand/µL    Eosinophils Absolute 0.19 0.00 - 0.61 Thousand/µL    Basophils Absolute 0.04 0.00 - 0.10 Thousands/µL   Hepatic function panel   Result Value Ref Range    Total Bilirubin 0.30 0.20 - 1.00 mg/dL    Bilirubin, Direct 0.13 0.00 - 0.20 mg/dL    Alkaline Phosphatase 64 34 - 104 U/L    AST 35 13 - 39 U/L    ALT 32 7 - 52 U/L     Total Protein 7.3 6.4 - 8.4 g/dL    Albumin 4.0 3.5 - 5.0 g/dL   CBC and Platelet   Result Value Ref Range    WBC 6.25 4.31 - 10.16 Thousand/uL    RBC 4.03 3.81 - 5.12 Million/uL    Hemoglobin 12.2 11.5 - 15.4 g/dL    Hematocrit 38.4 34.8 - 46.1 %    MCV 95 82 - 98 fL    MCH 30.3 26.8 - 34.3 pg    MCHC 31.8 31.4 - 37.4 g/dL    RDW 12.3 11.6 - 15.1 %    Platelets 279 149 - 390 Thousands/uL    MPV 9.1 8.9 - 12.7 fL   Basic metabolic panel   Result Value Ref Range    Sodium 138 135 - 147 mmol/L    Potassium 5.1 3.5 - 5.3 mmol/L    Chloride 106 96 - 108 mmol/L    CO2 24 21 - 32 mmol/L    ANION GAP 8 mmol/L    BUN 15 5 - 25 mg/dL    Creatinine 0.95 0.60 - 1.30 mg/dL    Glucose 144 (H) 65 - 140 mg/dL    Calcium 8.6 8.4 - 10.2 mg/dL    eGFR 70 ml/min/1.73sq m   Magnesium   Result Value Ref Range    Magnesium 2.0 1.9 - 2.7 mg/dL

## 2024-05-16 NOTE — PROGRESS NOTES
Diagnostic Hearing Evaluation    Name:  Mukund Davidson  :  1975  Age:  49 y.o.  MRN:  [id number]  Date of Evaluation: 24     HISTORY:    Reason for visit: Annual Hearing Test    Mukund Davidson was accompanied to today's appointment by the caregiver, who provided today's case history. Mukund is a new patient to our practice.  Her home requires yearly hearing tests. Patient is nonverbal. Caregiver is unable to comment regarding her hearing.    EVALUATION:    Otoscopy  Right: Non-occluding cerumen  Left: Non-occluding cerumen    Tympanometry  Right: Type A; normal middle ear pressure and static compliance   Left: Type A; normal middle ear pressure and static compliance     Distortion Product Otoacoustic Emissions (DPOAEs)  Right: Pass  Left: Pass    Audiometry:  Ear Specific, Sound field, Visual Reinforcement Audiometry (VRA) attempted today, however patient patient did not condition today.     *see attached audiogram    RECOMMENDATIONS:  Annual hearing eval, Return to [facility]. for F/U, and Copy to Patient/Caregiver    PATIENT EDUCATION:   The results of today's results and recommendations were reviewed with the caregiver and her hearing thresholds were explained at length.  Questions were addressed and the caregiver was encouraged to contact our department should concerns arise.      Guy Simmons., CCC-A  Clinical Audiologist  [facility]  [address] [place] [address] RD  [place] [address]

## 2024-05-17 ENCOUNTER — CLINICAL SUPPORT (OUTPATIENT)
Dept: OBGYN CLINIC | Facility: CLINIC | Age: 49
End: 2024-05-17
Payer: MEDICARE

## 2024-05-17 DIAGNOSIS — Z30.42 ENCOUNTER FOR SURVEILLANCE OF INJECTABLE CONTRACEPTIVE: Primary | ICD-10-CM

## 2024-05-17 PROCEDURE — 96372 THER/PROPH/DIAG INJ SC/IM: CPT

## 2024-05-17 RX ORDER — MEDROXYPROGESTERONE ACETATE 150 MG/ML
150 INJECTION, SUSPENSION INTRAMUSCULAR ONCE
Status: COMPLETED | OUTPATIENT
Start: 2024-05-17 | End: 2024-05-17

## 2024-05-17 RX ADMIN — MEDROXYPROGESTERONE ACETATE 150 MG: 150 INJECTION, SUSPENSION INTRAMUSCULAR at 09:50

## 2024-05-30 LAB
ALBUMIN SERPL-MCNC: 4.2 G/DL (ref 3.6–5.1)
ALBUMIN/CREAT UR: NORMAL MG/G CREAT
ALBUMIN/GLOB SERPL: 1.3 (CALC) (ref 1–2.5)
ALP SERPL-CCNC: 81 U/L (ref 37–153)
ALT SERPL-CCNC: 34 U/L (ref 6–29)
AST SERPL-CCNC: 36 U/L (ref 10–35)
BILIRUB SERPL-MCNC: 0.4 MG/DL (ref 0.2–1.2)
BUN SERPL-MCNC: 20 MG/DL (ref 7–25)
BUN SERPL-MCNC: 21 MG/DL (ref 7–25)
BUN/CREAT SERPL: 16 (CALC) (ref 6–22)
BUN/CREAT SERPL: 16 (CALC) (ref 6–22)
CALCIUM SERPL-MCNC: 9.7 MG/DL (ref 8.6–10.4)
CALCIUM SERPL-MCNC: 9.8 MG/DL (ref 8.6–10.4)
CARBAMAZEPINE SERPL-MCNC: 9 MG/L (ref 4–12)
CHLORIDE SERPL-SCNC: 107 MMOL/L (ref 98–110)
CHLORIDE SERPL-SCNC: 108 MMOL/L (ref 98–110)
CO2 SERPL-SCNC: 23 MMOL/L (ref 20–32)
CO2 SERPL-SCNC: 24 MMOL/L (ref 20–32)
CREAT SERPL-MCNC: 1.28 MG/DL (ref 0.5–1.03)
CREAT SERPL-MCNC: 1.33 MG/DL (ref 0.5–1.03)
CREAT UR-MCNC: 35 MG/DL (ref 20–275)
ERYTHROCYTE [DISTWIDTH] IN BLOOD BY AUTOMATED COUNT: 12.5 % (ref 11–15)
GFR/BSA.PRED SERPLBLD CYS-BASED-ARV: 48 ML/MIN/1.73M2
GFR/BSA.PRED SERPLBLD CYS-BASED-ARV: 51 ML/MIN/1.73M2
GLOBULIN SER CALC-MCNC: 3.3 G/DL (CALC) (ref 1.9–3.7)
GLUCOSE SERPL-MCNC: 87 MG/DL (ref 65–99)
GLUCOSE SERPL-MCNC: 88 MG/DL (ref 65–99)
HCT VFR BLD AUTO: 39.5 % (ref 35–45)
HGB BLD-MCNC: 13.2 G/DL (ref 11.7–15.5)
MCH RBC QN AUTO: 30.9 PG (ref 27–33)
MCHC RBC AUTO-ENTMCNC: 33.4 G/DL (ref 32–36)
MCV RBC AUTO: 92.5 FL (ref 80–100)
MICROALBUMIN UR-MCNC: <0.2 MG/DL
PHOSPHATE SERPL-MCNC: 3.7 MG/DL (ref 2.5–4.5)
PLATELET # BLD AUTO: 335 THOUSAND/UL (ref 140–400)
PMV BLD REES-ECKER: 9.8 FL (ref 7.5–12.5)
POTASSIUM SERPL-SCNC: 3.8 MMOL/L (ref 3.5–5.3)
POTASSIUM SERPL-SCNC: 3.8 MMOL/L (ref 3.5–5.3)
PROT SERPL-MCNC: 7.5 G/DL (ref 6.1–8.1)
PTH-INTACT SERPL-MCNC: 22 PG/ML (ref 16–77)
RBC # BLD AUTO: 4.27 MILLION/UL (ref 3.8–5.1)
SODIUM SERPL-SCNC: 139 MMOL/L (ref 135–146)
SODIUM SERPL-SCNC: 141 MMOL/L (ref 135–146)
WBC # BLD AUTO: 4.9 THOUSAND/UL (ref 3.8–10.8)

## 2024-06-10 ENCOUNTER — ANESTHESIA EVENT (OUTPATIENT)
Dept: ANESTHESIOLOGY | Facility: HOSPITAL | Age: 49
End: 2024-06-10

## 2024-06-10 ENCOUNTER — ANESTHESIA (OUTPATIENT)
Dept: ANESTHESIOLOGY | Facility: HOSPITAL | Age: 49
End: 2024-06-10

## 2024-06-12 ENCOUNTER — TELEPHONE (OUTPATIENT)
Dept: GASTROENTEROLOGY | Facility: CLINIC | Age: 49
End: 2024-06-12

## 2024-06-23 ENCOUNTER — NURSE TRIAGE (OUTPATIENT)
Dept: OTHER | Facility: OTHER | Age: 49
End: 2024-06-23

## 2024-06-23 NOTE — TELEPHONE ENCOUNTER
"Regarding: colonoscopy prep questions  ----- Message from Shari VARGAS sent at 6/23/2024  8:30 AM EDT -----  \"Mukund is having a colonoscopy tomorrow but we don't have the instructions\"    ----- Message from Shari VARGAS sent at 6/23/2024  8:29 AM EDT -----  \"Mukund is having a colonoscopy tomorrow but we don't have the instructions\"    "

## 2024-06-23 NOTE — TELEPHONE ENCOUNTER
"Reason for Disposition   Question about upcoming scheduled test, no triage required and triager able to answer question    Answer Assessment - Initial Assessment Questions  1. REASON FOR CALL or QUESTION: \"What is your reason for calling today?\" or \"How can I best help you?\" or \"What question do you have that I can help answer?\"      She is having a colonoscopy tomorrow and we don't have instructions for the prep.    Protocols used: Information Only Call - No Triage-ADULT-    Informed caregiver that instructions were sent to patient via my chart on 6/10. Caregiver verbalized understanding and will print out instructions. No further questions or concerns.   "

## 2024-06-24 ENCOUNTER — TELEPHONE (OUTPATIENT)
Age: 49
End: 2024-06-24

## 2024-06-24 ENCOUNTER — ANESTHESIA (OUTPATIENT)
Dept: GASTROENTEROLOGY | Facility: AMBULARY SURGERY CENTER | Age: 49
End: 2024-06-24

## 2024-06-24 ENCOUNTER — ANESTHESIA EVENT (OUTPATIENT)
Dept: GASTROENTEROLOGY | Facility: AMBULARY SURGERY CENTER | Age: 49
End: 2024-06-24

## 2024-06-24 ENCOUNTER — HOSPITAL ENCOUNTER (OUTPATIENT)
Dept: GASTROENTEROLOGY | Facility: AMBULARY SURGERY CENTER | Age: 49
Setting detail: OUTPATIENT SURGERY
Discharge: HOME | End: 2024-06-24
Attending: INTERNAL MEDICINE | Admitting: INTERNAL MEDICINE
Payer: MEDICARE

## 2024-06-24 VITALS
OXYGEN SATURATION: 98 % | RESPIRATION RATE: 14 BRPM | DIASTOLIC BLOOD PRESSURE: 88 MMHG | TEMPERATURE: 98.2 F | HEART RATE: 79 BPM | SYSTOLIC BLOOD PRESSURE: 147 MMHG

## 2024-06-24 DIAGNOSIS — Z12.11 SCREENING FOR COLON CANCER: ICD-10-CM

## 2024-06-24 PROBLEM — Z78.9 DIFFICULT INTRAVENOUS ACCESS: Status: ACTIVE | Noted: 2024-06-24

## 2024-06-24 PROCEDURE — G0121 COLON CA SCRN NOT HI RSK IND: HCPCS | Performed by: INTERNAL MEDICINE

## 2024-06-24 RX ORDER — LIDOCAINE HYDROCHLORIDE 10 MG/ML
INJECTION, SOLUTION EPIDURAL; INFILTRATION; INTRACAUDAL; PERINEURAL AS NEEDED
Status: DISCONTINUED | OUTPATIENT
Start: 2024-06-24 | End: 2024-06-24

## 2024-06-24 RX ORDER — PROPOFOL 10 MG/ML
INJECTION, EMULSION INTRAVENOUS AS NEEDED
Status: DISCONTINUED | OUTPATIENT
Start: 2024-06-24 | End: 2024-06-24

## 2024-06-24 RX ORDER — SODIUM CHLORIDE, SODIUM LACTATE, POTASSIUM CHLORIDE, CALCIUM CHLORIDE 600; 310; 30; 20 MG/100ML; MG/100ML; MG/100ML; MG/100ML
INJECTION, SOLUTION INTRAVENOUS CONTINUOUS PRN
Status: DISCONTINUED | OUTPATIENT
Start: 2024-06-24 | End: 2024-06-24

## 2024-06-24 RX ADMIN — LIDOCAINE HYDROCHLORIDE 0.5 ML: 10 INJECTION, SOLUTION EPIDURAL; INFILTRATION; INTRACAUDAL at 11:15

## 2024-06-24 RX ADMIN — LIDOCAINE HYDROCHLORIDE 50 MG: 10 INJECTION, SOLUTION EPIDURAL; INFILTRATION; INTRACAUDAL; PERINEURAL at 11:38

## 2024-06-24 RX ADMIN — SODIUM CHLORIDE, SODIUM LACTATE, POTASSIUM CHLORIDE, AND CALCIUM CHLORIDE: .6; .31; .03; .02 INJECTION, SOLUTION INTRAVENOUS at 10:41

## 2024-06-24 RX ADMIN — PROPOFOL 80 MG: 10 INJECTION, EMULSION INTRAVENOUS at 11:38

## 2024-06-24 RX ADMIN — LIDOCAINE HYDROCHLORIDE 0.5 ML: 10 INJECTION, SOLUTION EPIDURAL; INFILTRATION; INTRACAUDAL at 10:00

## 2024-06-24 NOTE — INTERVAL H&P NOTE
H&P reviewed. After examining the patient I find no changes in the patients condition since the H&P had been written.    Vitals:    06/24/24 0832   BP: 140/82   Pulse: 67   Resp: 16   Temp: (!) 96.9 °F (36.1 °C)   SpO2: 99%   He lost our IVs so procedure got postponed.  I spoke to the patient's mother over the phone to get consent.

## 2024-06-24 NOTE — TELEPHONE ENCOUNTER
Patients GI provider:  Dr. Toribio    Number to return call: [phone]    Reason for call: Maximiliano from [facility] group home calling with questions on next steps they should take since the colonoscopy was not successful.  Please call [phone]    Scheduled procedure/appointment date if applicable: Apt/procedure

## 2024-06-24 NOTE — H&P
History and Physical - [facility]  Mukund Davidson 49 y.o. female MRN: [id number]                  HPI: Mukund Davidson is a 49 y.o. year old female who presents for colonoscopy for screening.      REVIEW OF SYSTEMS: Per the HPI, and otherwise unremarkable.    Historical Information   Past Medical History:   Diagnosis Date    Brain injury (HCC)     Encephalopathy     Hypertension     Seizures (HCC)      No past surgical history on file.  Social History   Social History     Substance and Sexual Activity   Alcohol Use No     Social History     Substance and Sexual Activity   Drug Use No     Social History     Tobacco Use   Smoking Status Never   Smokeless Tobacco Never     Family History   Problem Relation Age of Onset    No Known Problems Mother     No Known Problems Father        Meds/Allergies       Current Outpatient Medications:     ammonium lactate (LAC-HYDRIN) 12 % cream    B Complex Vitamins (VITAMIN B COMPLEX PO)    benztropine (COGENTIN) 1 mg tablet    busPIRone (BUSPAR) 30 MG tablet    Calcium Carb-Cholecalciferol (OYSCO 500 + D) 500 mg-200 units per tablet    carBAMazepine (TEGretol XR) 100 mg 12 hr tablet    carBAMazepine (TEGretol XR) 400 mg 12 hr tablet    chlorhexidine (PERIDEX) 0.12 % solution    chlorproMAZINE (THORAZINE) 100 mg tablet    clonazePAM (KlonoPIN) 0.5 mg tablet    diazepam (VALIUM) 2 mg tablet    fexofenadine (ALLEGRA) 180 MG tablet    fluticasone (FLONASE) 50 mcg/act nasal spray    hydrOXYzine HCL (ATARAX) 25 mg tablet    lithium carbonate 300 mg capsule    metoprolol tartrate (LOPRESSOR) 50 mg tablet    montelukast (SINGULAIR) 10 mg tablet    Multiple Vitamins-Minerals (CERTAVITE/ANTIOXIDANTS PO)    neomycin-bacitracin-polymyxin b (NEOSPORIN) ointment    olopatadine HCl (PATADAY) 0.2 % opth drops    polyethylene glycol (GLYCOLAX) 17 GM/SCOOP powder    Psyllium (METAMUCIL) WAFR    risperiDONE (RisperDAL M-TABS) 2 mg dispersible tablet    Sunscreens (CHAPSTICK SUNBLOCK  15 EX)    Sunscreens (NEUTROGENA SENSITIVE SKIN 60+ EX)    acetaminophen (TYLENOL) 500 mg tablet    betamethasone dipropionate (DIPROSONE) 0.05 % cream    bisacodyl (DULCOLAX) 5 mg EC tablet    Cold Sore Products (HERPECIN-L EX)    medroxyPROGESTERone (DEPO-PROVERA) 150 mg/mL injection    Misc. Devices (Toothette Bite Block) MISC    Nayzilam 5 MG/0.1ML SOLN    Allergies   Allergen Reactions    Inderal [Propranolol] Shortness Of Breath    Trileptal [Oxcarbazepine] Other (See Comments)     Unknown; per care taker from group home     Catapres [Clonidine Hcl]        Objective     /82   Pulse 67   Temp (!) 96.9 °F (36.1 °C) (Temporal)   Resp 16   SpO2 99%       PHYSICAL EXAM    Gen: NAD  Head: NCAT  CV: RRR  CHEST: Clear  ABD: soft, NT/ND  EXT: no edema      ASSESSMENT/PLAN:  This is a 49 y.o. year old female here for colonoscopy, and she is stable and optimized for her procedure.

## 2024-06-24 NOTE — ANESTHESIA POSTPROCEDURE EVALUATION
Post-Op Assessment Note    CV Status:  Stable  Pain Score: 0    Pain management: adequate       Mental Status:  Alert and awake   Hydration Status:  Euvolemic   PONV Controlled:  Controlled   Airway Patency:  Patent     Post Op Vitals Reviewed: Yes    No anethesia notable event occurred.    Staff: CRNA           /57 (06/24/24 1142)    Temp      Pulse 67 (06/24/24 1142)   Resp 15 (06/24/24 1142)    SpO2 98 % (06/24/24 1142)

## 2024-06-24 NOTE — ANESTHESIA PREPROCEDURE EVALUATION
Procedure:  COLONOSCOPY    Relevant Problems   CARDIO   (+) Hypertension      GI/HEPATIC   (+) Difficulty swallowing      /RENAL   (+) Benign hypertension with CKD (chronic kidney disease) stage III (HCC)   (+) Stage 3a chronic kidney disease (HCC)      NEURO/PSYCH   (+) Breakthrough seizures   (+) Breakthrough seizures in setting of underlying epilepsy, unprovoked        Physical Exam    Airway    Mallampati score: unable to assess         Dental       Cardiovascular  Rhythm: regular, No weak pulses    Pulmonary   No stridor    Other Findings  post-pubertal.      Anesthesia Plan  ASA Score- 3     Anesthesia Type- IV sedation with anesthesia with ASA Monitors.         Additional Monitors:     Airway Plan:            Plan Factors-    Chart reviewed.   Existing labs reviewed. Patient summary reviewed.                  Induction- intravenous.    Postoperative Plan-         Informed Consent- Anesthetic plan and risks discussed with legal guardian.  I personally reviewed this patient with the CRNA. Discussed and agreed on the Anesthesia Plan with the CRNA..

## 2024-06-25 NOTE — TELEPHONE ENCOUNTER
Returned call to group home and gave them the recommendations decided on between Dr Toribio and patients mother.

## 2024-07-23 DIAGNOSIS — N93.9 ABNORMAL UTERINE BLEEDING (AUB): ICD-10-CM

## 2024-07-24 RX ORDER — MEDROXYPROGESTERONE ACETATE 150 MG/ML
INJECTION, SUSPENSION INTRAMUSCULAR
Qty: 1 ML | Refills: 1 | Status: SHIPPED | OUTPATIENT
Start: 2024-07-24

## 2024-07-29 ENCOUNTER — HOSPITAL ENCOUNTER (OUTPATIENT)
Dept: MAMMOGRAPHY | Facility: HOSPITAL | Age: 49
Discharge: HOME | End: 2024-07-29
Attending: OBSTETRICS & GYNECOLOGY
Payer: MEDICARE

## 2024-07-29 VITALS — WEIGHT: 168 LBS | HEIGHT: 64 IN | BODY MASS INDEX: 28.68 KG/M2

## 2024-07-29 DIAGNOSIS — Z12.31 SCREENING MAMMOGRAM, ENCOUNTER FOR: ICD-10-CM

## 2024-07-29 PROCEDURE — 77067 SCR MAMMO BI INCL CAD: CPT

## 2024-07-29 PROCEDURE — 77063 BREAST TOMOSYNTHESIS BI: CPT

## 2024-08-02 ENCOUNTER — CLINICAL SUPPORT (OUTPATIENT)
Dept: OBGYN CLINIC | Facility: CLINIC | Age: 49
End: 2024-08-02
Payer: MEDICARE

## 2024-08-02 VITALS
SYSTOLIC BLOOD PRESSURE: 120 MMHG | WEIGHT: 160 LBS | DIASTOLIC BLOOD PRESSURE: 76 MMHG | HEIGHT: 64 IN | BODY MASS INDEX: 27.31 KG/M2

## 2024-08-02 DIAGNOSIS — Z30.42 ENCOUNTER FOR SURVEILLANCE OF INJECTABLE CONTRACEPTIVE: Primary | ICD-10-CM

## 2024-08-02 PROCEDURE — 96372 THER/PROPH/DIAG INJ SC/IM: CPT

## 2024-08-02 RX ORDER — MEDROXYPROGESTERONE ACETATE 150 MG/ML
150 INJECTION, SUSPENSION INTRAMUSCULAR ONCE
Status: COMPLETED | OUTPATIENT
Start: 2024-08-02 | End: 2024-08-02

## 2024-08-02 RX ADMIN — MEDROXYPROGESTERONE ACETATE 150 MG: 150 INJECTION, SUSPENSION INTRAMUSCULAR at 10:59

## 2024-08-02 NOTE — PROGRESS NOTES
Pt was seen in office for her 12 week Depo. She will return in 12 weeks for her next scheduled Depo.

## 2024-08-05 ENCOUNTER — HOSPITAL ENCOUNTER (OUTPATIENT)
Dept: RADIOLOGY | Facility: HOSPITAL | Age: 49
Discharge: HOME | End: 2024-08-05
Payer: MEDICARE

## 2024-08-05 ENCOUNTER — APPOINTMENT (OUTPATIENT)
Dept: LAB | Facility: CLINIC | Age: 49
End: 2024-08-05
Payer: MEDICARE

## 2024-08-05 DIAGNOSIS — E87.8 LOW BICARBONATE LEVEL: ICD-10-CM

## 2024-08-05 DIAGNOSIS — E78.2 MIXED HYPERLIPIDEMIA: ICD-10-CM

## 2024-08-05 DIAGNOSIS — N18.31 STAGE 3A CHRONIC KIDNEY DISEASE (HCC): ICD-10-CM

## 2024-08-05 DIAGNOSIS — N18.30 BENIGN HYPERTENSION WITH CKD (CHRONIC KIDNEY DISEASE) STAGE III (HCC): ICD-10-CM

## 2024-08-05 DIAGNOSIS — G40.209 PARTIAL SYMPTOMATIC EPILEPSY WITH COMPLEX PARTIAL SEIZURES, NOT INTRACTABLE, WITHOUT STATUS EPILEPTICUS (HCC): ICD-10-CM

## 2024-08-05 DIAGNOSIS — R05.9 COUGH, UNSPECIFIED TYPE: ICD-10-CM

## 2024-08-05 DIAGNOSIS — I12.9 BENIGN HYPERTENSION WITH CKD (CHRONIC KIDNEY DISEASE) STAGE III (HCC): ICD-10-CM

## 2024-08-05 LAB
ALBUMIN SERPL BCG-MCNC: 4 G/DL (ref 3.5–5)
ALP SERPL-CCNC: 77 U/L (ref 34–104)
ALT SERPL W P-5'-P-CCNC: 25 U/L (ref 7–52)
ANION GAP SERPL CALCULATED.3IONS-SCNC: 7 MMOL/L (ref 4–13)
AST SERPL W P-5'-P-CCNC: 28 U/L (ref 13–39)
BASOPHILS # BLD AUTO: 0.04 THOUSANDS/ÂΜL (ref 0–0.1)
BASOPHILS NFR BLD AUTO: 1 % (ref 0–1)
BILIRUB SERPL-MCNC: 0.4 MG/DL (ref 0.2–1)
BUN SERPL-MCNC: 12 MG/DL (ref 5–25)
CALCIUM SERPL-MCNC: 9.4 MG/DL (ref 8.4–10.2)
CHLORIDE SERPL-SCNC: 104 MMOL/L (ref 96–108)
CHOLEST SERPL-MCNC: 163 MG/DL
CO2 SERPL-SCNC: 26 MMOL/L (ref 21–32)
CREAT SERPL-MCNC: 1.18 MG/DL (ref 0.6–1.3)
EOSINOPHIL # BLD AUTO: 0.16 THOUSAND/ÂΜL (ref 0–0.61)
EOSINOPHIL NFR BLD AUTO: 4 % (ref 0–6)
ERYTHROCYTE [DISTWIDTH] IN BLOOD BY AUTOMATED COUNT: 12.8 % (ref 11.6–15.1)
GFR SERPL CREATININE-BSD FRML MDRD: 53 ML/MIN/1.73SQ M
GLUCOSE P FAST SERPL-MCNC: 91 MG/DL (ref 65–99)
HCT VFR BLD AUTO: 42 % (ref 34.8–46.1)
HDLC SERPL-MCNC: 59 MG/DL
HGB BLD-MCNC: 13.4 G/DL (ref 11.5–15.4)
IMM GRANULOCYTES # BLD AUTO: 0.01 THOUSAND/UL (ref 0–0.2)
IMM GRANULOCYTES NFR BLD AUTO: 0 % (ref 0–2)
LDLC SERPL CALC-MCNC: 81 MG/DL (ref 0–100)
LYMPHOCYTES # BLD AUTO: 1.42 THOUSANDS/ÂΜL (ref 0.6–4.47)
LYMPHOCYTES NFR BLD AUTO: 33 % (ref 14–44)
MCH RBC QN AUTO: 31.6 PG (ref 26.8–34.3)
MCHC RBC AUTO-ENTMCNC: 31.9 G/DL (ref 31.4–37.4)
MCV RBC AUTO: 99 FL (ref 82–98)
MONOCYTES # BLD AUTO: 0.51 THOUSAND/ÂΜL (ref 0.17–1.22)
MONOCYTES NFR BLD AUTO: 12 % (ref 4–12)
NEUTROPHILS # BLD AUTO: 2.13 THOUSANDS/ÂΜL (ref 1.85–7.62)
NEUTS SEG NFR BLD AUTO: 50 % (ref 43–75)
NONHDLC SERPL-MCNC: 104 MG/DL
NRBC BLD AUTO-RTO: 0 /100 WBCS
PLATELET # BLD AUTO: 303 THOUSANDS/UL (ref 149–390)
PMV BLD AUTO: 9.2 FL (ref 8.9–12.7)
POTASSIUM SERPL-SCNC: 3.8 MMOL/L (ref 3.5–5.3)
PROT SERPL-MCNC: 7.2 G/DL (ref 6.4–8.4)
RBC # BLD AUTO: 4.24 MILLION/UL (ref 3.81–5.12)
SODIUM SERPL-SCNC: 137 MMOL/L (ref 135–147)
TRIGL SERPL-MCNC: 117 MG/DL
TSH SERPL DL<=0.05 MIU/L-ACNC: 1.4 UIU/ML (ref 0.45–4.5)
WBC # BLD AUTO: 4.27 THOUSAND/UL (ref 4.31–10.16)

## 2024-08-05 PROCEDURE — 80061 LIPID PANEL: CPT

## 2024-08-05 PROCEDURE — 36415 COLL VENOUS BLD VENIPUNCTURE: CPT

## 2024-08-05 PROCEDURE — 71046 X-RAY EXAM CHEST 2 VIEWS: CPT

## 2024-08-05 PROCEDURE — 85025 COMPLETE CBC W/AUTO DIFF WBC: CPT

## 2024-08-05 PROCEDURE — 80053 COMPREHEN METABOLIC PANEL: CPT

## 2024-08-05 PROCEDURE — 84443 ASSAY THYROID STIM HORMONE: CPT

## 2024-09-16 ENCOUNTER — OFFICE VISIT (OUTPATIENT)
Dept: URGENT CARE | Age: 49
End: 2024-09-16
Payer: MEDICARE

## 2024-09-16 VITALS
HEART RATE: 71 BPM | RESPIRATION RATE: 18 BRPM | WEIGHT: 162.4 LBS | TEMPERATURE: 97.8 F | DIASTOLIC BLOOD PRESSURE: 96 MMHG | OXYGEN SATURATION: 100 % | SYSTOLIC BLOOD PRESSURE: 133 MMHG | BODY MASS INDEX: 27.88 KG/M2

## 2024-09-16 DIAGNOSIS — T50.901A ACCIDENTAL MEDICATION ERROR, INITIAL ENCOUNTER: Primary | ICD-10-CM

## 2024-09-16 PROCEDURE — G0463 HOSPITAL OUTPT CLINIC VISIT: HCPCS | Performed by: STUDENT IN AN ORGANIZED HEALTH CARE EDUCATION/TRAINING PROGRAM

## 2024-09-16 PROCEDURE — 99213 OFFICE O/P EST LOW 20 MIN: CPT | Performed by: STUDENT IN AN ORGANIZED HEALTH CARE EDUCATION/TRAINING PROGRAM

## 2024-09-17 NOTE — PROGRESS NOTES
[facility]        NAME: Mukund Davidson is a 49 y.o. female  : 1975    MRN: [id number]  DATE: 2024  TIME: 9:26 PM    Assessment and Plan   Accidental medication error, initial encounter [T50.901A]  1. Accidental medication error, initial encounter        PDMP reviewed. Will maintain same daily dosing, doing 0.5mg tonight. To resume usual schedule tomorrow.      Patient Instructions   Please give Mukund the 0.5 mg dose of clonazepam that she would have usually received earlier today.  Give all of her other prescribed evening meds.  Tomorrow, 2024, resume her usual timing and dosing amounts of her clonazepam.    Follow up with PCP in 3-5 days.  Proceed to  ER if symptoms worsen.    If tests have been performed at [facility], our office will contact you with results if changes need to be made to the care plan discussed with you at the visit.  You can review your full results on [facility].    Chief Complaint     Chief Complaint   Patient presents with    Medication Problem     Patient was given in Am 2mg of klonopin. Dosing 0.5 mg Morning and 2pm . Evening dose is 2mg. Patient is from a group home and is with caregiver who found the error in medication . All evening were held . Advised by care giver they need to come in ED/ Urgent care for evaluation with medication issues.          History of Present Illness       Patient presents with her caretaker.  Caretaker was getting ready to give Raiza her evening meds when she noticed that she was accidentally given her evening dose of her clonazepam earlier today.  Usually she takes 0.5 mg in the morning and at 2 PM.  She then takes 2 mg at bedtime.  She was accidentally given 2 mg in the morning and then another 0.5 mg dose.  Patient has been acting her usual self.  When the caregiver reported the error that was made in her medications earlier today she was advised that she the patient had to be seen at urgent care or ER to receive  instructions on how to dose the medications tonight.        Review of Systems   Review of Systems   All other systems reviewed and are negative.        Current Medications       Current Outpatient Medications:     acetaminophen (TYLENOL) 500 mg tablet, Take 500 mg by mouth every 4 (four) hours as needed for mild pain, Disp: , Rfl:     ammonium lactate (LAC-HYDRIN) 12 % cream, Apply 1 application topically 2 (two) times a day, Disp: , Rfl:     B Complex Vitamins (VITAMIN B COMPLEX PO), Take 1 tablet by mouth 2 (two) times a day, Disp: , Rfl:     benztropine (COGENTIN) 1 mg tablet, Take 1 mg by mouth 2 (two) times a day, Disp: , Rfl:     betamethasone dipropionate (DIPROSONE) 0.05 % cream, Apply 1 application topically 2 (two) times a day, Disp: , Rfl:     bisacodyl (DULCOLAX) 5 mg EC tablet, Take as directed as per written office instructions, Disp: 2 tablet, Rfl: 0    busPIRone (BUSPAR) 30 MG tablet, Take 1 tablet (30 mg total) by mouth 2 (two) times a day, Disp: , Rfl: 0    Calcium Carb-Cholecalciferol (OYSCO 500 + D) 500 mg-200 units per tablet, 2 (two) times a day, Disp: , Rfl:     carBAMazepine (TEGretol XR) 100 mg 12 hr tablet, Take 1 tablet (100 mg total) by mouth every 12 (twelve) hours 8 am and 8 pm (total dose: 500 mg twice daily), Disp: 180 tablet, Rfl: 3    carBAMazepine (TEGretol XR) 400 mg 12 hr tablet, Take 1 tablet (400 mg total) by mouth 2 (two) times a day 8 am and 8 pm (total dose: 500 mg twice daily), Disp: 180 tablet, Rfl: 3    chlorhexidine (PERIDEX) 0.12 % solution, Apply 15 mL to the mouth or throat 2 (two) times a day, Disp: , Rfl:     chlorproMAZINE (THORAZINE) 100 mg tablet, , Disp: , Rfl:     clonazePAM (KlonoPIN) 0.5 mg tablet, Take 1 tablet (0.5 mg total) by mouth 3 (three) times a day, Disp: , Rfl: 0    Cold Sore Products (HERPECIN-L EX), Apply topically if needed, Disp: , Rfl:     diazepam (VALIUM) 2 mg tablet, , Disp: , Rfl:     fexofenadine (ALLEGRA) 180 MG tablet, Take 180 mg by  mouth daily, Disp: , Rfl:     fluticasone (FLONASE) 50 mcg/act nasal spray, 1 spray into each nostril daily as needed for rhinitis, Disp: , Rfl: 0    hydrOXYzine HCL (ATARAX) 25 mg tablet, , Disp: , Rfl:     lithium carbonate 300 mg capsule, Take 300 mg by mouth 2 (two) times a day with meals, Disp: , Rfl:     medroxyPROGESTERone acetate (DEPO-PROVERA SYRINGE) 150 mg/mL injection, INJECT 1ML (150MG) INTRAMUSCULARLY EVERY THREE MONTHS - GIVEN IN OFFICE, Disp: 1 mL, Rfl: 1    metoprolol tartrate (LOPRESSOR) 50 mg tablet, Take 50 mg by mouth 2 (two) times a day, Disp: , Rfl:     Misc. Devices (Toothette Bite Block) MISC, Use 2 (two) times a day, Disp: , Rfl:     montelukast (SINGULAIR) 10 mg tablet, Take 10 mg by mouth daily at bedtime, Disp: , Rfl:     Multiple Vitamins-Minerals (CERTAVITE/ANTIOXIDANTS PO), Take 1 tablet by mouth daily, Disp: , Rfl:     Nayzilam 5 MG/0.1ML SOLN, FOR SEIZURE >5 MINUTES OR CLUSTER OF SEIZURES, GIVE ONE SPRAY INTO ONE NOSTRIL. IF NO RESPONSE AFTER 10 MINUTES, MAY GIVE 2ND DOSE OF ONE SPRAY IN OTHER NOSTRIL FOR SEIZURE CONTROL, Disp: 2 each, Rfl: 1    neomycin-bacitracin-polymyxin b (NEOSPORIN) ointment, Apply 1 application topically if needed, Disp: , Rfl:     olopatadine HCl (PATADAY) 0.2 % opth drops, Administer 1 drop to both eyes daily, Disp: , Rfl:     polyethylene glycol (GLYCOLAX) 17 GM/SCOOP powder, Take as directed as per written office instructions, Disp: 238 g, Rfl: 0    Psyllium (METAMUCIL) WAFR, Take 1 Wafer by mouth daily, Disp: , Rfl:     risperiDONE (RisperDAL M-TABS) 2 mg dispersible tablet, Take 1 mg by mouth 3 (three) times a day 8AM, 2PM, 8PM, Disp: , Rfl:     Sunscreens (CHAPSTICK SUNBLOCK 15 EX), Apply topically if needed, Disp: , Rfl:     Sunscreens (NEUTROGENA SENSITIVE SKIN 60+ EX), Apply topically if needed, Disp: , Rfl:     Current Allergies     Allergies as of 09/16/2024 - Reviewed 08/02/2024   Allergen Reaction Noted    Inderal [propranolol] Shortness Of  Breath 01/20/2016    Trileptal [oxcarbazepine] Other (See Comments) 09/01/2023    Catapres [clonidine hcl]  01/20/2016            The following portions of the patient's history were reviewed and updated as appropriate: allergies, current medications, past family history, past medical history, past social history, past surgical history and problem list.     Past Medical History:   Diagnosis Date    Brain injury (HCC)     Encephalopathy     Hypertension     Seizures (HCC)        No past surgical history on file.    Family History   Problem Relation Age of Onset    No Known Problems Mother     No Known Problems Father     No Known Problems Sister     No Known Problems Maternal Grandmother     No Known Problems Maternal Grandfather     No Known Problems Paternal Grandmother     No Known Problems Paternal Grandfather          Medications have been verified.        Objective   /96   Pulse 71   Temp 97.8 °F (36.6 °C) (Temporal)   Resp 18   Wt 73.7 kg (162 lb 6.4 oz)   LMP  (LMP Unknown)   SpO2 100%   BMI 27.88 kg/m²   No LMP recorded (lmp unknown). Patient is postmenopausal.       Physical Exam     Physical Exam  Vitals and nursing note reviewed.   Constitutional:       General: She is not in acute distress.     Appearance: She is not toxic-appearing.   HENT:      Head: Normocephalic and atraumatic.      Right Ear: External ear normal.      Left Ear: External ear normal.      Nose: Nose normal.      Mouth/Throat:      Mouth: Mucous membranes are moist.   Eyes:      Extraocular Movements: Extraocular movements intact.      Conjunctiva/sclera: Conjunctivae normal.   Skin:     General: Skin is warm and dry.   Neurological:      Mental Status: She is alert.   Psychiatric:      Comments: At baseline per caregiver

## 2024-09-17 NOTE — PATIENT INSTRUCTIONS
Please give Mukund the 0.5 mg dose of clonazepam that she would have usually received earlier today.  Give all of her other prescribed evening meds.  Tomorrow, 9/17/2024, resume her usual timing and dosing amounts of her clonazepam.

## 2024-09-18 ENCOUNTER — NURSE TRIAGE (OUTPATIENT)
Age: 49
End: 2024-09-18

## 2024-09-18 DIAGNOSIS — G40.209 LOCALIZATION-RELATED (FOCAL) (PARTIAL) SYMPTOMATIC EPILEPSY AND EPILEPTIC SYNDROMES WITH COMPLEX PARTIAL SEIZURES, NOT INTRACTABLE, WITHOUT STATUS EPILEPTICUS (HCC): Primary | ICD-10-CM

## 2024-09-18 NOTE — TELEPHONE ENCOUNTER
SUREKHA likely called them about hospital follow up, but it looks like SUREKHA neurology intention was that she follow up with us.     She was seen by urgent care on 9/16 because she was accidentally given clonazepam 2 mg in the morning. Typically she gets 0.5 mg AM and 2 mg PM. Her clonazepam in the evening was decreased or held on 9/16. Please confirm that clonazepam has been back to the intended dose since 9/17. The change on 9/16 could cause problems with her sleep schedule for a couple days.     She was apparently temporarily started on topiramate when in the hospital, but then this was replaced by levetiracetam. Please confirm that she is not taking topiramate. Also please confirm carbamazepine dose and schedule (she was taking 500 mg AM, 400 mg 2 PM and 100 mg at night in the past).     If unsteadiness and sleep do not improve over the next 1-2 days or if symptoms worsen, then levetiracetam dose could be lowered to 250 mg morning and 500 mg evening. I would like to have her get blood work done including a levetiracetam level to see if it may be contributing. Her history of CKD may mean that levels are on the higher side. Have they been checking BP at home after the amlodipine was added? any low measurements?     If there is earlier availability she can be scheduled to see Summer or Amparo if her symptoms do not improve.

## 2024-09-18 NOTE — TELEPHONE ENCOUNTER
"Last visit Dr. Marquis, 1/15    Patient is s/p admission [facility] 9/5 . Caregiver called to report change in patient status since discharge; said was admitted for \"staring seizure\": and had a \"grand mal seizure\" while hospitialized.  Since discharge, patient has unsteady gait requiring assistance with ambulation and \"carrying to bed\" also sleeping pattern has changed as patient not sleeping through the night at times.  Denies Fall.  Said medication, keppra was added 500 mg 8 AM and 8 PM since admission.    Confirmed meds patient currently receiving:  Keppra 500 mg 8 AM and 8 PM (new)  carbamazepine 500 mg 8 AM and 8 PM  Nayzilam as needed, sertraline, congentin, buspar, lithium, thorazine, klonopin, risperdal, amlodipine also added.     Denies seizure activity since admitted.    F/u scheduled 10/9 with Dr. Marquis, PCP visit scheduled 9/27    Please provide recommendation and I will call back today.  Reason for Disposition   Nursing judgment    Answer Assessment - Initial Assessment Questions  1. REASON FOR CALL: \"What is your main concern right now?\"      S/p hospitalization at [facility], 9/5 for \"staring\" seizure, Support staff reporting patient had \"grand mal seizure: while in hospital.  Reporting patient has had unsteady gait since discharge from hospital, needing assistance with ambulation ( denies falls) has to be carried to bed at times, \"there are days when she needs a lot of assistance\" and days \"not that bad\", was independent with ambulation .  Also reporting change in sleeping pattern, prior, patient \"does not always sleep through the night anymore.\"  2. ONSET:    Unsteady gait since discharge from [facility]  3. SEVERITY   Caretaker describes as \"moderate, we are managing but not 100% comfortable\"  4. FEVER: \"Do you have a fever?\"  Denies, bp \"okay\" does not feel illness related.   5. OTHER SYMPTOMS: \"Do you have any other new symptoms?\"  No seizure activity since hospitalization   6. INTERVENTIONS AND RESPONSE: " "\"What have you done so far to try to make this better? What medications have you used?\"    Caregiver, Marbella Carrera (support staff) said keppra was added , 500 mg 8 AM and 8 PM  However no other med changes    Other meds caregiver  confirmed that patient is receiving   Carbamazepine 500 mg 8 AM and 8 PM, keppra 500 mg 8 AM and 8 PM  Sertraline, congentin, buspar, lithium, thorazine, klonopin, risperdal, nayzilam prn      Per hospital note: START taking these medications     amLODIPine 10 MG tablet  Commonly known as: NORVASC  Take 1 tablet (10 mg total) by mouth daily.  Start taking on: September 6, 2024    levETIRAcetam 500 MG tablet  Commonly known as: KEPPRA  Take 1 tablet (500 mg total) by mouth 2 (two) times a day.                   7. PREGNANCY: \"Is there any chance you are pregnant?\"     Denies    Protocols used: No Protocol Available-ADULT-[place]    "

## 2024-09-18 NOTE — TELEPHONE ENCOUNTER
Called re: below and spoke with Teresita, who answered Dr. Marquis questions as follows:    Yes, pt is back to her intended clonazepam dose of 0.5 mg in am and 2 mg in pm since 9/17/24.  No pt is not on topiramate.  Carbamazepine dose schedule has changed and is now 500 mg at 8 am and 500 mg at 8 pm.  Yes, they have a order to check pt's BP nightly at 8 pm.  No, there have not been any low readings.    Per Teresita, she will get labs drawn ASAP. Once levetriacetam level comes back high, or if pt does not improve over the next 1-2 days, Teresita states that Dr. Marquis would need to send a new script to the pharmacy re: lowering Kera, as they are a group home and require that documentation.    Teresita wanted to mention to Dr. Marquis that if he was thinking that the clonazepam had possibly effected pt's sleep or caused this sedation, she wanted him to know that pt's current condition/symptoms predates the med error on 9/16. She states that the pt has been like this ever since leaving the hospital on 9/5/24. Teresita asked that lab scripts be faxed to her at [phone] (done).    BELLE

## 2024-09-18 NOTE — TELEPHONE ENCOUNTER
Pt's caregiver Lianet called returning a call re: sooner appt.   I don't see any documentation re: this. I don't see sooner appt w/ Dr Marquis.  Placed pt on cancellation list.     I see that Laquita HOLLOWAY is in pt's chart but I don't see any documentation yet.    PEPs, did anybody called pt's caregiver Lianet?

## 2024-09-19 ENCOUNTER — TELEPHONE (OUTPATIENT)
Dept: NEUROLOGY | Facility: CLINIC | Age: 49
End: 2024-09-19

## 2024-09-20 LAB
ALBUMIN SERPL-MCNC: 3.9 G/DL (ref 3.6–5.1)
ALBUMIN/GLOB SERPL: 1.3 (CALC) (ref 1–2.5)
ALP SERPL-CCNC: 75 U/L (ref 37–153)
ALT SERPL-CCNC: 22 U/L (ref 6–29)
AST SERPL-CCNC: 25 U/L (ref 10–35)
BILIRUB SERPL-MCNC: 0.5 MG/DL (ref 0.2–1.2)
BUN SERPL-MCNC: 19 MG/DL (ref 7–25)
BUN/CREAT SERPL: 16 (CALC) (ref 6–22)
CALCIUM SERPL-MCNC: 9.1 MG/DL (ref 8.6–10.4)
CARBAMAZEPINE SERPL-MCNC: 11.1 MG/L (ref 4–12)
CHLORIDE SERPL-SCNC: 104 MMOL/L (ref 98–110)
CO2 SERPL-SCNC: 23 MMOL/L (ref 20–32)
CREAT SERPL-MCNC: 1.19 MG/DL (ref 0.5–1.03)
ERYTHROCYTE [DISTWIDTH] IN BLOOD BY AUTOMATED COUNT: 11.7 % (ref 11–15)
GFR/BSA.PRED SERPLBLD CYS-BASED-ARV: 55 ML/MIN/1.73M2
GLOBULIN SER CALC-MCNC: 2.9 G/DL (CALC) (ref 1.9–3.7)
GLUCOSE SERPL-MCNC: 85 MG/DL (ref 65–99)
HCT VFR BLD AUTO: 37.4 % (ref 35–45)
HGB BLD-MCNC: 12.6 G/DL (ref 11.7–15.5)
LITHIUM SERPL-SCNC: 0.5 MMOL/L (ref 0.6–1.2)
MCH RBC QN AUTO: 31.9 PG (ref 27–33)
MCHC RBC AUTO-ENTMCNC: 33.7 G/DL (ref 32–36)
MCV RBC AUTO: 94.7 FL (ref 80–100)
PLATELET # BLD AUTO: 314 THOUSAND/UL (ref 140–400)
PMV BLD REES-ECKER: 9.4 FL (ref 7.5–12.5)
POTASSIUM SERPL-SCNC: 3.8 MMOL/L (ref 3.5–5.3)
PROT SERPL-MCNC: 6.8 G/DL (ref 6.1–8.1)
RBC # BLD AUTO: 3.95 MILLION/UL (ref 3.8–5.1)
SODIUM SERPL-SCNC: 136 MMOL/L (ref 135–146)
WBC # BLD AUTO: 4.6 THOUSAND/UL (ref 3.8–10.8)

## 2024-09-24 LAB
ALBUMIN SERPL-MCNC: 3.9 G/DL (ref 3.6–5.1)
ALBUMIN/GLOB SERPL: 1.3 (CALC) (ref 1–2.5)
ALP SERPL-CCNC: 75 U/L (ref 37–153)
ALT SERPL-CCNC: 22 U/L (ref 6–29)
AST SERPL-CCNC: 25 U/L (ref 10–35)
BILIRUB SERPL-MCNC: 0.5 MG/DL (ref 0.2–1.2)
BUN SERPL-MCNC: 19 MG/DL (ref 7–25)
BUN/CREAT SERPL: 16 (CALC) (ref 6–22)
CALCIUM SERPL-MCNC: 9.1 MG/DL (ref 8.6–10.4)
CARBAMAZEPINE SERPL-MCNC: 11.1 MG/L (ref 4–12)
CHLORIDE SERPL-SCNC: 104 MMOL/L (ref 98–110)
CO2 SERPL-SCNC: 23 MMOL/L (ref 20–32)
CREAT SERPL-MCNC: 1.19 MG/DL (ref 0.5–1.03)
ERYTHROCYTE [DISTWIDTH] IN BLOOD BY AUTOMATED COUNT: 11.7 % (ref 11–15)
GFR/BSA.PRED SERPLBLD CYS-BASED-ARV: 55 ML/MIN/1.73M2
GLOBULIN SER CALC-MCNC: 2.9 G/DL (CALC) (ref 1.9–3.7)
GLUCOSE SERPL-MCNC: 85 MG/DL (ref 65–99)
HCT VFR BLD AUTO: 37.4 % (ref 35–45)
HGB BLD-MCNC: 12.6 G/DL (ref 11.7–15.5)
LEVETIRACETAM SERPL-MCNC: 9.3 MCG/ML
LITHIUM SERPL-SCNC: 0.5 MMOL/L (ref 0.6–1.2)
MCH RBC QN AUTO: 31.9 PG (ref 27–33)
MCHC RBC AUTO-ENTMCNC: 33.7 G/DL (ref 32–36)
MCV RBC AUTO: 94.7 FL (ref 80–100)
PLATELET # BLD AUTO: 314 THOUSAND/UL (ref 140–400)
PMV BLD REES-ECKER: 9.4 FL (ref 7.5–12.5)
POTASSIUM SERPL-SCNC: 3.8 MMOL/L (ref 3.5–5.3)
PROT SERPL-MCNC: 6.8 G/DL (ref 6.1–8.1)
RBC # BLD AUTO: 3.95 MILLION/UL (ref 3.8–5.1)
SODIUM SERPL-SCNC: 136 MMOL/L (ref 135–146)
WBC # BLD AUTO: 4.6 THOUSAND/UL (ref 3.8–10.8)

## 2024-10-09 ENCOUNTER — OFFICE VISIT (OUTPATIENT)
Dept: NEUROLOGY | Facility: CLINIC | Age: 49
End: 2024-10-09
Payer: MEDICARE

## 2024-10-09 VITALS
TEMPERATURE: 97.8 F | HEART RATE: 83 BPM | SYSTOLIC BLOOD PRESSURE: 142 MMHG | DIASTOLIC BLOOD PRESSURE: 91 MMHG | HEIGHT: 64 IN | BODY MASS INDEX: 28.48 KG/M2 | OXYGEN SATURATION: 100 % | WEIGHT: 166.8 LBS

## 2024-10-09 DIAGNOSIS — N18.31 STAGE 3A CHRONIC KIDNEY DISEASE (HCC): ICD-10-CM

## 2024-10-09 DIAGNOSIS — G40.209 PARTIAL SYMPTOMATIC EPILEPSY WITH COMPLEX PARTIAL SEIZURES, NOT INTRACTABLE, WITHOUT STATUS EPILEPTICUS (HCC): Primary | ICD-10-CM

## 2024-10-09 DIAGNOSIS — F84.9 PDD (PERVASIVE DEVELOPMENTAL DISORDER): ICD-10-CM

## 2024-10-09 DIAGNOSIS — N18.30 BENIGN HYPERTENSION WITH CKD (CHRONIC KIDNEY DISEASE) STAGE III (HCC): ICD-10-CM

## 2024-10-09 DIAGNOSIS — I12.9 BENIGN HYPERTENSION WITH CKD (CHRONIC KIDNEY DISEASE) STAGE III (HCC): ICD-10-CM

## 2024-10-09 PROCEDURE — 99214 OFFICE O/P EST MOD 30 MIN: CPT | Performed by: PSYCHIATRY & NEUROLOGY

## 2024-10-09 RX ORDER — LEVETIRACETAM 500 MG/1
500 TABLET ORAL 2 TIMES DAILY
Qty: 180 TABLET | Refills: 3 | Status: SHIPPED | OUTPATIENT
Start: 2024-10-09

## 2024-10-09 RX ORDER — CARBAMAZEPINE 400 MG/1
400 TABLET, EXTENDED RELEASE ORAL 2 TIMES DAILY
Qty: 180 TABLET | Refills: 3 | Status: SHIPPED | OUTPATIENT
Start: 2024-10-09

## 2024-10-09 RX ORDER — CARBAMAZEPINE 100 MG/1
100 TABLET, EXTENDED RELEASE ORAL EVERY 12 HOURS
Qty: 180 TABLET | Refills: 3 | Status: SHIPPED | OUTPATIENT
Start: 2024-10-09

## 2024-10-09 RX ORDER — LEVETIRACETAM 500 MG/1
500 TABLET ORAL 2 TIMES DAILY
COMMUNITY
Start: 2024-09-04 | End: 2024-10-09 | Stop reason: SDUPTHER

## 2024-10-09 NOTE — PATIENT INSTRUCTIONS
Continue levetiracetam and carbamazepine unchanged.   Return in 6 months.   Have blood work done one week before next visit.

## 2024-10-09 NOTE — PROGRESS NOTES
[facility]  Follow Up Visit    Impression/Plan    Ms. Davidson is a 49 y.o. female with epilepsy, likely focal due to childhood injury, but most recent EEG (slowing) and MRI unrevealing. Her history includes PDD and MR. Seizures had been well controlled, with seizure freedom for greater than 7 years.  But now there have been seizures in 9/2023 and 9/2024. Levetiracetam recently added to carbamazepine. She was taken off topiramate in 2020 due to problematic side effects.  no clear behavioral change with levetiracetam at this point. History includes CKD, recent GFR 55.     Patient Instructions   Continue levetiracetam and carbamazepine unchanged.   Return in 6 months.   Have blood work done one week before next visit.     Diagnoses and all orders for this visit:    Partial symptomatic epilepsy with complex partial seizures, not intractable, without status epilepticus (HCC)  -     carBAMazepine (TEGretol XR) 100 mg 12 hr tablet; Take 1 tablet (100 mg total) by mouth every 12 (twelve) hours 8 am and 8 pm (total dose: 500 mg twice daily)  -     carBAMazepine (TEGretol XR) 400 mg 12 hr tablet; Take 1 tablet (400 mg total) by mouth 2 (two) times a day 8 am and 8 pm (total dose: 500 mg twice daily)  -     levETIRAcetam (KEPPRA) 500 mg tablet; Take 1 tablet (500 mg total) by mouth 2 (two) times a day  -     Carbamazepine level, total; Future  -     Levetiracetam level; Future  -     Comprehensive metabolic panel; Future    PDD (pervasive developmental disorder)    Stage 3a chronic kidney disease (HCC)    Benign hypertension with CKD (chronic kidney disease) stage III (HCC)    Other orders  -     Discontinue: levETIRAcetam (KEPPRA) 500 mg tablet; Take 500 mg by mouth 2 (two) times a day          Will Davidson is returning to the [facility] for follow up.     Interval Events:   Seizures since last visit: 9/1/2024 (prior: 9/2023 after 7 yrs of seizure  "freedom, prompted carbamazepine increase)    Arrives with caregiver. Admitted to [facility] after a cluster of mild seizures on 9/1/2024. Levetiracetam added. Behavior is at baseline. No additional seizures. EEG showed mild slowing. Admission extended due to HTN.     Current AEDs:  Carbamazepine 500 mg bid (currently taking 500 mg morning and 400 mg at 2 PM and 100 mg at night)  Levetiracetam 500 mg bid  Medication side effects: None  Medication adherence: Yes     Event/Seizure semiology:  Violent shaking with foaming at the mouth that can be preceded by a loud scream     Special Features  Status epilepticus:  Not known  Self Injury Seizures:  None known  Precipitating Factors: Stress     Epilepsy Risk Factors:  Intellectual disability  Head injury (moderate/severe)     Prior AEDs:  Topiramate stopped 2020 in setting of RTA     Prior Evaluation:  MRI in 2008 was read as normal. Dr. Leonard's last note indicates that her last EEG was unrevealing for source and no focal discharges noted. There was generalized encephalopathy.     Prior MRI brain or EEG reports not available in [facility] or Care Everywhere for review.       [facility]   This electroencephalogram is abnormal in the awake and drowsy state(s), as   well as during photic stimulation due to mild to moderate diffuse   background slowing. No epileptiform discharges or seizures noted.     Psychiatric History:  Followed by psychiatry, multiple psychiatric medications. Behavioral outbursts.      Social History:   Driving: No  Lives Alone: No  Occupation: on permanent disability      Objective    /91 (BP Location: Right arm, Patient Position: Sitting, Cuff Size: Large)   Pulse 83   Temp 97.8 °F (36.6 °C) (Temporal)   Ht 5' 4\" (1.626 m)   Wt 75.7 kg (166 lb 12.8 oz)   LMP  (LMP Unknown)   SpO2 100%   BMI 28.63 kg/m²      General Exam  No acute distress.    Neurologic Exam  Mental Status:  Alert. Active. Not always allowing typical personal space. "   Language: nonverbal  Cranial Nerves:  attends to visual stimulation from the right and left. Gaze conjugate. Face symmetric.   Motor:  no clear evidence of weakness or asymmetry when she moves through the room.   Gait: steady gait.

## 2024-10-10 DIAGNOSIS — G40.209 LOCALIZATION-RELATED (FOCAL) (PARTIAL) SYMPTOMATIC EPILEPSY AND EPILEPTIC SYNDROMES WITH COMPLEX PARTIAL SEIZURES, NOT INTRACTABLE, WITHOUT STATUS EPILEPTICUS (HCC): ICD-10-CM

## 2024-10-11 RX ORDER — MIDAZOLAM 5 MG/.1ML
SPRAY NASAL
Qty: 2 EACH | Refills: 1 | Status: SHIPPED | OUTPATIENT
Start: 2024-10-11

## 2024-10-25 ENCOUNTER — CLINICAL SUPPORT (OUTPATIENT)
Dept: OBGYN CLINIC | Facility: CLINIC | Age: 49
End: 2024-10-25
Payer: MEDICARE

## 2024-10-25 VITALS
HEIGHT: 64 IN | SYSTOLIC BLOOD PRESSURE: 128 MMHG | DIASTOLIC BLOOD PRESSURE: 66 MMHG | BODY MASS INDEX: 28 KG/M2 | WEIGHT: 164 LBS

## 2024-10-25 DIAGNOSIS — Z30.42 ENCOUNTER FOR SURVEILLANCE OF INJECTABLE CONTRACEPTIVE: Primary | ICD-10-CM

## 2024-10-25 PROCEDURE — 96372 THER/PROPH/DIAG INJ SC/IM: CPT

## 2024-10-25 RX ORDER — MEDROXYPROGESTERONE ACETATE 150 MG/ML
150 INJECTION, SUSPENSION INTRAMUSCULAR ONCE
Status: COMPLETED | OUTPATIENT
Start: 2024-10-25 | End: 2024-10-25

## 2024-10-25 RX ADMIN — MEDROXYPROGESTERONE ACETATE 150 MG: 150 INJECTION, SUSPENSION INTRAMUSCULAR at 11:29

## 2024-11-08 ENCOUNTER — TELEPHONE (OUTPATIENT)
Dept: NEPHROLOGY | Facility: CLINIC | Age: 49
End: 2024-11-08

## 2024-11-08 NOTE — TELEPHONE ENCOUNTER
Called pt's facility and scheduled follow up appt on 1/2/25 at 11:30am with Dr Tafoya in the [facility] from the recall list.    Patient is a 66 yo Chinese male with recent admit [facility] for CHF, Kidney damage.  Patient is s/p liver transplant (x5 years), kidney transplant (3/23), pacemaker 10/23.  12/23 Patient started retaining fluid and was hospitalized for testing due to this.  New CRT pacemaker put in, dialysis 2x a week possibly temporarily.  He lives with wife who assists with communication.  Some edema in trunk, decreased LE strength and dynamic balance with  TUG 20 secs without device but unsteady, Ducetti 18/28. Instructed patient to use FWW and adjusted for him.  Given WHEP supine for circulation and strengthening.  PT 1w1, 2w3.

## 2024-11-14 ENCOUNTER — ANNUAL EXAM (OUTPATIENT)
Dept: OBGYN CLINIC | Facility: CLINIC | Age: 49
End: 2024-11-14
Payer: MEDICARE

## 2024-11-14 VITALS
SYSTOLIC BLOOD PRESSURE: 102 MMHG | WEIGHT: 167.2 LBS | HEIGHT: 64 IN | BODY MASS INDEX: 28.54 KG/M2 | DIASTOLIC BLOOD PRESSURE: 74 MMHG

## 2024-11-14 DIAGNOSIS — N91.2 AMENORRHEA: Primary | ICD-10-CM

## 2024-11-14 PROCEDURE — 99213 OFFICE O/P EST LOW 20 MIN: CPT | Performed by: OBSTETRICS & GYNECOLOGY

## 2024-11-14 RX ORDER — AMLODIPINE BESYLATE 10 MG/1
10 TABLET ORAL DAILY
COMMUNITY
Start: 2024-09-06

## 2024-11-15 NOTE — PROGRESS NOTES
"Assessment/Plan:     Diagnoses and all orders for this visit:    Menopause  -     FSH and LH; Future  -     Estradiol; Future    Other orders  -     amLODIPine (NORVASC) 10 mg tablet; Take 10 mg by mouth daily       49-year-old female  Had brain injury with encephalopathy  Chronic hypertension  Seizure disorder  Patient was taking Depo-Provera secondary to dysmenorrhea menorrhagia  Plan   Pap/HPV negative in 2020  Diet/exercise  Up-to-date with mammogram  Calcium/vitamin D  To go for lab to check menopausal status if labs confirm menopause no Depo-Provera needed   All questions answered.     Subjective:      Patient ID: Mukund Davidson is a 49 y.o. female.    Gynecologic Exam      Patient presents to the office today for annual exam with her caregiver from the facility  Patient is not able to give any history patient is nonverbal  Patient is not able to give any consent  Patient was taking Depo-Provera for abnormal uterine bleeding  Patient is not sexually active  At the patient age I would recommend to go for lab to check menopausal status if labs confirm menopause no need to continue Depo-Provera  As patient is not able to give consent no genital exam performed today  Breast exam done today with chaperone in the room no abnormality noted  The following portions of the patient's history were reviewed and updated as appropriate: allergies, current medications, past family history, past medical history, past social history, past surgical history and problem list.    Review of Systems      Objective:      /74 (BP Location: Left arm, Patient Position: Sitting, Cuff Size: Adult)   Ht 5' 4\" (1.626 m)   Wt 75.8 kg (167 lb 3.2 oz)   LMP  (LMP Unknown)   BMI 28.70 kg/m²          Physical Exam  Constitutional:       Comments: Patient is nonverbal     Chest:   Breasts:     Right: No swelling, bleeding, inverted nipple, mass, nipple discharge, skin change or tenderness.      Left: No swelling, bleeding, inverted " nipple, mass, nipple discharge, skin change or tenderness.   Neurological:      Mental Status: She is disoriented.

## 2024-12-19 ENCOUNTER — TELEPHONE (OUTPATIENT)
Age: 49
End: 2024-12-19

## 2024-12-19 NOTE — TELEPHONE ENCOUNTER
LES Bowman from patient's group home called in to inform that pt's pharmacy will be the [address]. Please send all medications to this pharmacy.

## 2024-12-19 NOTE — TELEPHONE ENCOUNTER
[facility] to provide [facility]; [facility] [place], [place] - [facility] [address]A [address]ERON [[id number]].    Thank you

## 2024-12-24 ENCOUNTER — TELEPHONE (OUTPATIENT)
Dept: NEPHROLOGY | Facility: CLINIC | Age: 49
End: 2024-12-24

## 2024-12-24 DIAGNOSIS — E87.6 HYPOKALEMIA: ICD-10-CM

## 2024-12-24 DIAGNOSIS — N18.31 STAGE 3A CHRONIC KIDNEY DISEASE (HCC): Primary | ICD-10-CM

## 2024-12-24 NOTE — TELEPHONE ENCOUNTER
----- Message from Tadeo Tafoya MD sent at 12/23/2024  4:03 PM EST -----  BMP, UACR before next visit  ----- Message -----  From: Tuyet Reyes MA  Sent: 12/23/2024   8:38 AM EST  To: Tadeo Tafoya MD    Pt has an appointment next week with you. No active labs in epic. Please advise.

## 2024-12-24 NOTE — TELEPHONE ENCOUNTER
Called pt and asked to please complete lab work prior to the follow up appointment next week. Pt verbalized understanding.

## 2024-12-31 LAB
BUN SERPL-MCNC: 17 MG/DL (ref 7–25)
BUN/CREAT SERPL: 16 (CALC) (ref 6–22)
CALCIUM SERPL-MCNC: 9.3 MG/DL (ref 8.6–10.4)
CHLORIDE SERPL-SCNC: 107 MMOL/L (ref 98–110)
CO2 SERPL-SCNC: 25 MMOL/L (ref 20–32)
CREAT SERPL-MCNC: 1.08 MG/DL (ref 0.5–1.03)
ERYTHROCYTE [DISTWIDTH] IN BLOOD BY AUTOMATED COUNT: 11.7 % (ref 11–15)
GFR/BSA.PRED SERPLBLD CYS-BASED-ARV: 62 ML/MIN/1.73M2
GLUCOSE SERPL-MCNC: 84 MG/DL (ref 65–99)
HCT VFR BLD AUTO: 38 % (ref 35–45)
HGB BLD-MCNC: 12.5 G/DL (ref 11.7–15.5)
MCH RBC QN AUTO: 31.6 PG (ref 27–33)
MCHC RBC AUTO-ENTMCNC: 32.9 G/DL (ref 32–36)
MCV RBC AUTO: 96.2 FL (ref 80–100)
PHOSPHATE SERPL-MCNC: 3 MG/DL (ref 2.5–4.5)
PLATELET # BLD AUTO: 287 THOUSAND/UL (ref 140–400)
PMV BLD REES-ECKER: 9.6 FL (ref 7.5–12.5)
POTASSIUM SERPL-SCNC: 3.8 MMOL/L (ref 3.5–5.3)
PTH-INTACT SERPL-MCNC: 44 PG/ML (ref 16–77)
RBC # BLD AUTO: 3.95 MILLION/UL (ref 3.8–5.1)
SODIUM SERPL-SCNC: 139 MMOL/L (ref 135–146)
WBC # BLD AUTO: 6.8 THOUSAND/UL (ref 3.8–10.8)

## 2025-01-02 ENCOUNTER — OFFICE VISIT (OUTPATIENT)
Dept: NEPHROLOGY | Facility: CLINIC | Age: 50
End: 2025-01-02
Payer: MEDICARE

## 2025-01-02 VITALS
DIASTOLIC BLOOD PRESSURE: 74 MMHG | HEIGHT: 64 IN | SYSTOLIC BLOOD PRESSURE: 126 MMHG | WEIGHT: 170 LBS | BODY MASS INDEX: 29.02 KG/M2

## 2025-01-02 DIAGNOSIS — I12.9 BENIGN HYPERTENSION WITH CKD (CHRONIC KIDNEY DISEASE) STAGE III (HCC): Primary | ICD-10-CM

## 2025-01-02 DIAGNOSIS — N18.30 BENIGN HYPERTENSION WITH CKD (CHRONIC KIDNEY DISEASE) STAGE III (HCC): Primary | ICD-10-CM

## 2025-01-02 DIAGNOSIS — E87.8 LOW BICARBONATE LEVEL: ICD-10-CM

## 2025-01-02 DIAGNOSIS — N18.31 STAGE 3A CHRONIC KIDNEY DISEASE (HCC): ICD-10-CM

## 2025-01-02 PROCEDURE — 99214 OFFICE O/P EST MOD 30 MIN: CPT | Performed by: INTERNAL MEDICINE

## 2025-01-02 NOTE — ASSESSMENT & PLAN NOTE
Lab Results   Component Value Date    EGFR 62 12/30/2024    EGFR 55 (L) 09/19/2024    EGFR 49 (L) 09/04/2024    CREATININE 1.08 (H) 12/30/2024    CREATININE 1.19 (H) 09/19/2024    CREATININE 1.32 (H) 09/04/2024

## 2025-01-02 NOTE — ASSESSMENT & PLAN NOTE
-This has improved and resolved.  Serum bicarb level 25.  continued to remain off Topamax since early 2021.  -Continue to remain off sodium bicarb supplements.

## 2025-01-02 NOTE — ASSESSMENT & PLAN NOTE
Lab Results   Component Value Date    EGFR 62 12/30/2024    EGFR 55 (L) 09/19/2024    EGFR 49 (L) 09/04/2024    CREATININE 1.08 (H) 12/30/2024    CREATININE 1.19 (H) 09/19/2024    CREATININE 1.32 (H) 09/04/2024   Chronic kidney disease stage III (baseline serum creatinine 1.3, previous baseline used to be 1.0)  -Last serum creatinine stable 1.0 in December 2024.  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  -UA bland without hematuria or proteinuria in February 2023.  UACR nonsignificant in May 2024.  -patient is unable to be weaned off lithium due to her multiple significant psych issues as per my prior discussion with psychiatrist. (psychiatrist Dr. Easley  - [phone])  -renal ultrasound in February 2019 shows right kidney 10.2 cm, left kidney 9.9 cm, echogenic appearance of right kidney, no hydronephrosis, echogenic appearance in the left kidney    Concern for medullary nephrocalcinosis on prior renal ultrasound  -no obvious recent episodes of kidney stone flare-up.    Hypertension  -due to patient's developmental disorder, patient does not sit still to be able to check blood pressure accurately.  she remains agitated intermittently throughout the entire office visit  -BP acceptable.  -Currently remains on metoprolol, amlodipine.  As per caretaker her blood pressure has been better controlled at the group home.

## 2025-01-11 NOTE — PROGRESS NOTES
[address] [facility] [address]          NAME: Alberto Crocker is a 50 y o  female  : 1975    MRN: [id number]  DATE: August 15, 2020  TIME: 12:16 PM    Assessment and Plan   Abrasion of right shoulder, initial encounter [S40 211A]  1  Abrasion of right shoulder, initial encounter     2  Abrasion         Patient Instructions   Abrasions with no signs of infection  To continue topical bacitracin as she has been  Watch for signs of infection such as redness, warmth or purulent drainage  Any of these signs to present to PCP for wound check  Caregiver did verbalize understanding  To present to the ER if symptoms worsen  Chief Complaint     Chief Complaint   Patient presents with    Abrasion     3 open sores on shoulder area and left upper arm x ongoing         History of Present Illness   Alberto Crocker presents to the clinic c/o    Patient nonverbal with caregiver  Caregiver reports she picks at herself often and she has two open sores on right shoulder and one on left upper arm  No surrounding reddness or warmth  No purulent drainage  No fevers  No pain  Otherwise healthy  Uses topical bacitracin on open sores  Here to make sure not infected  Review of Systems   Review of Systems   Constitutional: Negative for chills, fatigue and fever  Respiratory: Negative for chest tightness, shortness of breath and wheezing  Cardiovascular: Negative for chest pain  Skin: Positive for wound  Negative for color change, pallor and rash  Neurological: Negative for dizziness  Hematological: Negative for adenopathy           Current Medications     Long-Term Medications   Medication Sig Dispense Refill    ammonium lactate (LAC-HYDRIN) 12 % cream Apply 1 application topically 2 (two) times a day      benztropine (COGENTIN) 1 mg tablet Take 1 mg by mouth 2 (two) times a day      betamethasone dipropionate (DIPROSONE) 0 05 % cream Apply 1 application topically 2 (two) times a day      busPIRone (BUSPAR) 15 mg tablet Take 30 mg by mouth 2 (two) times a day       carBAMazepine (TEGretol XR) 200 mg 12 hr tablet Take 2 tablets (400 mg total) by mouth 2 (two) times a day 124 tablet 5    chlorproMAZINE (THORAZINE) 50 mg tablet Take 50 mg by mouth 3 (three) times a day        clonazePAM (KlonoPIN) 0 5 mg tablet Take 0 5 mg by mouth 3 (three) times a day       fexofenadine (ALLEGRA) 180 MG tablet Take 180 mg by mouth daily      fluticasone (FLONASE) 50 mcg/act nasal spray 1 spray into each nostril daily      lithium carbonate 300 mg capsule Take 300 mg by mouth 2 (two) times a day with meals      medroxyPROGESTERone (DEPO-PROVERA) 150 mg/mL injection Inject 1 mL (150 mg total) into a muscle every 3 (three) months 1 mL 4    metoprolol tartrate (LOPRESSOR) 50 mg tablet Take 50 mg by mouth 2 (two) times a day      montelukast (SINGULAIR) 10 mg tablet Take 10 mg by mouth daily at bedtime      olopatadine HCl (PATADAY) 0 2 % opth drops Administer 1 drop to both eyes daily      Psyllium (METAMUCIL) WAFR Take 1 Wafer by mouth daily      risperiDONE (RisperDAL M-TABS) 2 mg dispersible tablet Take 3 mg by mouth 3 (three) times a day      topiramate (TOPAMAX) 50 MG tablet Take 1 tablet (50 mg total) by mouth 2 (two) times a day 60 tablet 5    LORazepam (ATIVAN) 1 mg tablet Take 1 tablet by mouth daily at bedtime as needed (increased activity limiting sleep) (Patient taking differently: Take 1 mg by mouth as needed (increased activity limiting sleep) ) 10 tablet 0    neomycin-bacitracin-polymyxin (NEOSPORIN) 5-400-5,000 ointment Apply topically 3 (three) times a day 28 3 g 0    neomycin-bacitracin-polymyxin b (NEOSPORIN) ointment Apply 1 application topically daily         Current Allergies     Allergies as of 08/15/2020 - Reviewed 08/15/2020   Allergen Reaction Noted    Inderal [propranolol] Shortness Of Breath 01/20/2016    Catapres [clonidine hcl]  01/20/2016    Other  12/13/2016            The following portions of the patient's history were reviewed and updated as appropriate: allergies, current medications, past family history, past medical history, past social history, past surgical history and problem list   Past Medical History:   Diagnosis Date    Brain injury ([facility] )     Encephalopathy     Hypertension     Seizures ([facility] )      History reviewed  No pertinent surgical history    Social History     Socioeconomic History    Marital status: Single     Spouse name: Not on file    Number of children: Not on file    Years of education: Not on file    Highest education level: Not on file   Occupational History    Not on file   Social Needs    Financial resource strain: Not on file    Food insecurity     Worry: Not on file     Inability: Not on file    Transportation needs     Medical: Not on file     Non-medical: Not on file   Tobacco Use    Smoking status: Never Smoker    Smokeless tobacco: Never Used   Substance and Sexual Activity    Alcohol use: No    Drug use: No    Sexual activity: Never   Lifestyle    Physical activity     Days per week: Not on file     Minutes per session: Not on file    Stress: Not on file   Relationships    Social connections     Talks on phone: Not on file     Gets together: Not on file     Attends Spiritism service: Not on file     Active member of club or organization: Not on file     Attends meetings of clubs or organizations: Not on file     Relationship status: Not on file    Intimate partner violence     Fear of current or ex partner: Not on file     Emotionally abused: Not on file     Physically abused: Not on file     Forced sexual activity: Not on file   Other Topics Concern    Not on file   Social History Narrative    Not on file       Objective   /82   Pulse 83   Temp (!) 97 2 °F (36 2 °C)   Resp 16   Ht 5' 3" (1 6 m)   Wt 74 4 kg (164 lb)   LMP  (LMP Unknown)   SpO2 98%   BMI 29 05 kg/m²      Physical Exam     Physical Exam  Vitals signs and nursing note reviewed  Constitutional:       General: She is not in acute distress  Appearance: She is normal weight  She is not ill-appearing  HENT:      Head: Normocephalic and atraumatic  Right Ear: External ear normal       Left Ear: External ear normal       Nose: Nose normal       Mouth/Throat:      Mouth: Mucous membranes are moist       Pharynx: Oropharynx is clear  No oropharyngeal exudate or posterior oropharyngeal erythema  Eyes:      General:         Right eye: No discharge  Left eye: No discharge  Conjunctiva/sclera: Conjunctivae normal    Cardiovascular:      Rate and Rhythm: Normal rate  Pulses: Normal pulses  Heart sounds: No murmur  No friction rub  No gallop  Pulmonary:      Effort: Pulmonary effort is normal  No respiratory distress  Breath sounds: Normal breath sounds  No stridor  No wheezing or rhonchi  Skin:     Findings: Abrasion (open sore x 2 on right upper shoulder, one on left posterior upper shoulder, no surrounding redness, warmth or discharge) present  Neurological:      Mental Status: She is alert           Lila Albert PA-C 2 = difficulty swallowing liquids/foods

## 2025-01-14 ENCOUNTER — CLINICAL SUPPORT (OUTPATIENT)
Dept: OBGYN CLINIC | Facility: CLINIC | Age: 50
End: 2025-01-14
Payer: MEDICARE

## 2025-01-14 VITALS
SYSTOLIC BLOOD PRESSURE: 122 MMHG | DIASTOLIC BLOOD PRESSURE: 70 MMHG | BODY MASS INDEX: 28.34 KG/M2 | WEIGHT: 166 LBS | HEIGHT: 64 IN

## 2025-01-14 DIAGNOSIS — Z30.42 ENCOUNTER FOR SURVEILLANCE OF INJECTABLE CONTRACEPTIVE: Primary | ICD-10-CM

## 2025-01-14 PROCEDURE — 96372 THER/PROPH/DIAG INJ SC/IM: CPT

## 2025-01-14 RX ORDER — RISPERIDONE 1 MG/1
TABLET ORAL
COMMUNITY
Start: 2024-12-10

## 2025-01-14 RX ORDER — MEDROXYPROGESTERONE ACETATE 150 MG/ML
150 INJECTION, SUSPENSION INTRAMUSCULAR ONCE
Status: COMPLETED | OUTPATIENT
Start: 2025-01-14 | End: 2025-01-14

## 2025-01-14 RX ORDER — CHLORPROMAZINE HYDROCHLORIDE 50 MG/1
TABLET, FILM COATED ORAL
COMMUNITY
Start: 2024-10-09

## 2025-01-14 RX ORDER — LITHIUM CARBONATE 300 MG
TABLET ORAL
COMMUNITY
Start: 2024-12-10

## 2025-01-14 RX ORDER — CLONAZEPAM 2 MG/1
TABLET ORAL
COMMUNITY
Start: 2025-01-03

## 2025-01-14 RX ADMIN — MEDROXYPROGESTERONE ACETATE 150 MG: 150 INJECTION, SUSPENSION INTRAMUSCULAR at 09:39

## 2025-02-01 LAB
BASOPHILS # BLD AUTO: 52 CELLS/UL (ref 0–200)
BASOPHILS NFR BLD AUTO: 1.2 %
BUN SERPL-MCNC: 21 MG/DL (ref 7–25)
BUN/CREAT SERPL: 18 (CALC) (ref 6–22)
CALCIUM SERPL-MCNC: 9.6 MG/DL (ref 8.6–10.4)
CALCIUM SERPL-MCNC: 9.6 MG/DL (ref 8.6–10.4)
CHLORIDE SERPL-SCNC: 109 MMOL/L (ref 98–110)
CO2 SERPL-SCNC: 25 MMOL/L (ref 20–32)
CREAT SERPL-MCNC: 1.14 MG/DL (ref 0.5–1.03)
CREAT UR-MCNC: 35 MG/DL (ref 20–275)
EOSINOPHIL # BLD AUTO: 202 CELLS/UL (ref 15–500)
EOSINOPHIL NFR BLD AUTO: 4.7 %
ERYTHROCYTE [DISTWIDTH] IN BLOOD BY AUTOMATED COUNT: 11.5 % (ref 11–15)
GFR/BSA.PRED SERPLBLD CYS-BASED-ARV: 58 ML/MIN/1.73M2
GLUCOSE SERPL-MCNC: 90 MG/DL (ref 65–99)
HCT VFR BLD AUTO: 39.3 % (ref 35–45)
HGB BLD-MCNC: 12.9 G/DL (ref 11.7–15.5)
LYMPHOCYTES # BLD AUTO: 1281 CELLS/UL (ref 850–3900)
LYMPHOCYTES NFR BLD AUTO: 29.8 %
MCH RBC QN AUTO: 31 PG (ref 27–33)
MCHC RBC AUTO-ENTMCNC: 32.8 G/DL (ref 32–36)
MCV RBC AUTO: 94.5 FL (ref 80–100)
MONOCYTES # BLD AUTO: 460 CELLS/UL (ref 200–950)
MONOCYTES NFR BLD AUTO: 10.7 %
NEUTROPHILS # BLD AUTO: 2305 CELLS/UL (ref 1500–7800)
NEUTROPHILS NFR BLD AUTO: 53.6 %
PLATELET # BLD AUTO: 296 THOUSAND/UL (ref 140–400)
PMV BLD REES-ECKER: 9.9 FL (ref 7.5–12.5)
POTASSIUM SERPL-SCNC: 4 MMOL/L (ref 3.5–5.3)
PROT UR-MCNC: 5 MG/DL (ref 5–24)
PROT/CREAT UR: 0.14 MG/MG CREAT (ref 0.02–0.18)
PROT/CREAT UR: 143 MG/G CREAT (ref 24–184)
PTH-INTACT SERPL-MCNC: 27 PG/ML (ref 16–77)
RBC # BLD AUTO: 4.16 MILLION/UL (ref 3.8–5.1)
SODIUM SERPL-SCNC: 143 MMOL/L (ref 135–146)
WBC # BLD AUTO: 4.3 THOUSAND/UL (ref 3.8–10.8)

## 2025-02-03 ENCOUNTER — RESULTS FOLLOW-UP (OUTPATIENT)
Dept: OTHER | Facility: HOSPITAL | Age: 50
End: 2025-02-03

## 2025-02-04 NOTE — TELEPHONE ENCOUNTER
Spoke pt's nurse regarding the following message per Dr. Tafoya     Creatinine 1.1 stable.     Pt's nurse verbalized understanding.

## 2025-02-17 ENCOUNTER — TELEPHONE (OUTPATIENT)
Age: 50
End: 2025-02-17

## 2025-02-17 NOTE — TELEPHONE ENCOUNTER
Alessia with [facility] facility called, no comm form on file, patient is non verbal. Asking if patient is still to be getting the depo and if not, if they can be updated.     Can be reached back at [phone].

## 2025-02-18 DIAGNOSIS — Z78.0 MENOPAUSE: Primary | ICD-10-CM

## 2025-02-18 NOTE — TELEPHONE ENCOUNTER
Spoke with Dr. Garcia regarding message. Recommendation is to not have depo for now and complete blood work (estradiol, FSH& LH) in mid April. Spoke with Alessia with [facility] aware of plan. Advised to call office if patient has any bleeding.

## 2025-02-20 NOTE — TELEPHONE ENCOUNTER
Group home nurse calling in to verify that the patient, Mukund, is to get blood work in April and no depo shot at this time.  It may not be needed if the patient is in menopause.  She needs documentation of this plan, that the depo shot is to be cancelled for now, and the lab orders faxed to : [phone].

## 2025-02-20 NOTE — TELEPHONE ENCOUNTER
Alessia Greenfield from [facility] facility called in again. Requesting call back to discuss having the depo appt canceled, and asking why if she has not had recent labs done?    Please follow up with Alessia at [facility].

## 2025-03-11 DIAGNOSIS — N93.9 ABNORMAL UTERINE BLEEDING (AUB): ICD-10-CM

## 2025-03-11 RX ORDER — MEDROXYPROGESTERONE ACETATE 150 MG/ML
INJECTION, SUSPENSION INTRAMUSCULAR
Qty: 1 ML | Refills: 1 | OUTPATIENT
Start: 2025-03-11

## 2025-03-11 NOTE — TELEPHONE ENCOUNTER
Medication: depo provera injection    Dose/Frequency: 1 v60anuts    Quantity: 1    [facility]    Office:   [] PCP/Provider -   [x] Speciality/Provider - Mary Dugan    Does the patient have enough for 3 days?   [] Yes   [x] No - Send as HP to POD

## 2025-03-19 ENCOUNTER — TELEPHONE (OUTPATIENT)
Dept: NEUROLOGY | Facility: CLINIC | Age: 50
End: 2025-03-19

## 2025-03-19 ENCOUNTER — APPOINTMENT (OUTPATIENT)
Dept: LAB | Facility: CLINIC | Age: 50
End: 2025-03-19
Payer: MEDICARE

## 2025-03-19 DIAGNOSIS — N18.31 STAGE 3A CHRONIC KIDNEY DISEASE (HCC): ICD-10-CM

## 2025-03-19 DIAGNOSIS — I12.9 BENIGN HYPERTENSION WITH CKD (CHRONIC KIDNEY DISEASE) STAGE III (HCC): ICD-10-CM

## 2025-03-19 DIAGNOSIS — G40.209 PARTIAL SYMPTOMATIC EPILEPSY WITH COMPLEX PARTIAL SEIZURES, NOT INTRACTABLE, WITHOUT STATUS EPILEPTICUS (HCC): ICD-10-CM

## 2025-03-19 DIAGNOSIS — E87.6 HYPOKALEMIA: ICD-10-CM

## 2025-03-19 DIAGNOSIS — Z78.0 MENOPAUSE: ICD-10-CM

## 2025-03-19 DIAGNOSIS — N18.30 BENIGN HYPERTENSION WITH CKD (CHRONIC KIDNEY DISEASE) STAGE III (HCC): ICD-10-CM

## 2025-03-19 DIAGNOSIS — G40.209 LOCALIZATION-RELATED (FOCAL) (PARTIAL) SYMPTOMATIC EPILEPSY AND EPILEPTIC SYNDROMES WITH COMPLEX PARTIAL SEIZURES, NOT INTRACTABLE, WITHOUT STATUS EPILEPTICUS (HCC): ICD-10-CM

## 2025-03-19 DIAGNOSIS — E87.8 LOW BICARBONATE LEVEL: ICD-10-CM

## 2025-03-19 LAB
ALBUMIN SERPL BCG-MCNC: 4 G/DL (ref 3.5–5)
ALP SERPL-CCNC: 78 U/L (ref 34–104)
ALT SERPL W P-5'-P-CCNC: 21 U/L (ref 7–52)
ANION GAP SERPL CALCULATED.3IONS-SCNC: 6 MMOL/L (ref 4–13)
AST SERPL W P-5'-P-CCNC: 26 U/L (ref 13–39)
BILIRUB SERPL-MCNC: 0.42 MG/DL (ref 0.2–1)
BUN SERPL-MCNC: 12 MG/DL (ref 5–25)
CALCIUM SERPL-MCNC: 9.4 MG/DL (ref 8.4–10.2)
CARBAMAZEPINE SERPL-MCNC: 10.2 UG/ML (ref 4–12)
CHLORIDE SERPL-SCNC: 102 MMOL/L (ref 96–108)
CO2 SERPL-SCNC: 27 MMOL/L (ref 21–32)
CREAT SERPL-MCNC: 1.16 MG/DL (ref 0.6–1.3)
CREAT UR-MCNC: 31.8 MG/DL
ERYTHROCYTE [DISTWIDTH] IN BLOOD BY AUTOMATED COUNT: 12.1 % (ref 11.6–15.1)
ESTRADIOL SERPL-MCNC: 20.9 PG/ML
FSH SERPL-ACNC: 16.5 MIU/ML
GFR SERPL CREATININE-BSD FRML MDRD: 54 ML/MIN/1.73SQ M
GLUCOSE SERPL-MCNC: 86 MG/DL (ref 65–140)
HCT VFR BLD AUTO: 41.4 % (ref 34.8–46.1)
HGB BLD-MCNC: 13 G/DL (ref 11.5–15.4)
LEVETIRACETAM SERPL-MCNC: 8.4 UG/ML (ref 12–46)
LH SERPL-ACNC: 12.1 MIU/ML
LITHIUM SERPL-SCNC: 0.3 MMOL/L (ref 0.6–1.2)
MCH RBC QN AUTO: 31.7 PG (ref 26.8–34.3)
MCHC RBC AUTO-ENTMCNC: 31.4 G/DL (ref 31.4–37.4)
MCV RBC AUTO: 101 FL (ref 82–98)
MICROALBUMIN UR-MCNC: <7 MG/L
PHOSPHATE SERPL-MCNC: 3.5 MG/DL (ref 2.7–4.5)
PLATELET # BLD AUTO: 305 THOUSANDS/UL (ref 149–390)
PMV BLD AUTO: 9.2 FL (ref 8.9–12.7)
POTASSIUM SERPL-SCNC: 3.5 MMOL/L (ref 3.5–5.3)
PROT SERPL-MCNC: 6.8 G/DL (ref 6.4–8.4)
PTH-INTACT SERPL-MCNC: 26.5 PG/ML (ref 12–88)
RBC # BLD AUTO: 4.1 MILLION/UL (ref 3.81–5.12)
SODIUM SERPL-SCNC: 135 MMOL/L (ref 135–147)
WBC # BLD AUTO: 4 THOUSAND/UL (ref 4.31–10.16)

## 2025-03-19 PROCEDURE — 36415 COLL VENOUS BLD VENIPUNCTURE: CPT

## 2025-03-19 PROCEDURE — 83001 ASSAY OF GONADOTROPIN (FSH): CPT

## 2025-03-19 PROCEDURE — 82670 ASSAY OF TOTAL ESTRADIOL: CPT

## 2025-03-19 PROCEDURE — 80178 ASSAY OF LITHIUM: CPT

## 2025-03-19 PROCEDURE — 83002 ASSAY OF GONADOTROPIN (LH): CPT

## 2025-03-19 PROCEDURE — 83520 IMMUNOASSAY QUANT NOS NONAB: CPT

## 2025-03-19 NOTE — TELEPHONE ENCOUNTER
Patient is scheduled with Summer Zuniga on 4/18 and this visit needs to be rescheduled as provider will be in [place] office on this date.     Called and spoke to Alessia. Rescheduled appointment to 4/29.

## 2025-03-19 NOTE — TELEPHONE ENCOUNTER
Alessia cohen from Nursing home facility called back to Verify appt details.     I verified that the pt is scheduled 4/29/25 at 1:30 pm with Summer Ramos.

## 2025-03-20 ENCOUNTER — TELEPHONE (OUTPATIENT)
Age: 50
End: 2025-03-20

## 2025-03-20 NOTE — TELEPHONE ENCOUNTER
Patient had LH, FSH, Estradiol done in review. Patient was scheduled for next depo 3/31. In previous messages patient/care worker was advised to wait until mid April to have blood work done.

## 2025-03-20 NOTE — TELEPHONE ENCOUNTER
Alessia calling in as pt is nonverbal and Alessia is asking if the depo shot should be given. Alessia was notified that there isnt a communication consent form on file. Alessia is requesting to know if the depo needs to be given and it needs to be in writing.

## 2025-03-23 ENCOUNTER — RESULTS FOLLOW-UP (OUTPATIENT)
Dept: OBGYN CLINIC | Facility: CLINIC | Age: 50
End: 2025-03-23

## 2025-03-24 DIAGNOSIS — N93.9 ABNORMAL UTERINE BLEEDING (AUB): ICD-10-CM

## 2025-03-24 NOTE — TELEPHONE ENCOUNTER
Reason for call:   [x] Refill   [] Prior Auth  [] Other:     Office:   [] PCP/Provider -   [x] Specialty/Provider - oberwin/ Mary Dugan PA-C     Medication: medroxyPROGESTERone acetate (DEPO-PROVERA SYRINGE) 150 mg/mL injection     Dose/Frequency: NJECT 1ML (150MG) INTRAMUSCULARLY EVERY THREE MONTHS - GIVEN IN OFFICE     Quantity: 1 mL    [address] [facility] [address] [phone]     Local Pharmacy   Does the patient have enough for 3 days?   [x] Yes   [] No - Send as HP to POD    Mail Away Pharmacy   Does the patient have enough for 10 days?   [] Yes   [] No - Send as HP to POD

## 2025-03-25 NOTE — TELEPHONE ENCOUNTER
Patient called to request a refill for their medroxyPROGESTERone acetate (DEPO-PROVERA SYRINGE) 150 mg/mL injection  advised a refill was requested on 3/24/25 and is pending approval. Patient verbalized understanding and is in agreement.     Does the patient have enough for 3 days?   [] Yes   [x] No - Send as HP to POD    Patient's appointment is 3/31/25

## 2025-03-27 RX ORDER — MEDROXYPROGESTERONE ACETATE 150 MG/ML
150 INJECTION, SUSPENSION INTRAMUSCULAR
Qty: 1 ML | Refills: 0 | Status: SHIPPED | OUTPATIENT
Start: 2025-03-27

## 2025-03-31 ENCOUNTER — CLINICAL SUPPORT (OUTPATIENT)
Dept: OBGYN CLINIC | Facility: CLINIC | Age: 50
End: 2025-03-31
Payer: MEDICARE

## 2025-03-31 VITALS — BODY MASS INDEX: 28.49 KG/M2 | DIASTOLIC BLOOD PRESSURE: 68 MMHG | WEIGHT: 166 LBS | SYSTOLIC BLOOD PRESSURE: 124 MMHG

## 2025-03-31 DIAGNOSIS — Z30.42 ENCOUNTER FOR SURVEILLANCE OF INJECTABLE CONTRACEPTIVE: Primary | ICD-10-CM

## 2025-03-31 PROCEDURE — 96372 THER/PROPH/DIAG INJ SC/IM: CPT

## 2025-03-31 RX ORDER — MEDROXYPROGESTERONE ACETATE 150 MG/ML
150 INJECTION, SUSPENSION INTRAMUSCULAR ONCE
Status: COMPLETED | OUTPATIENT
Start: 2025-03-31 | End: 2025-03-31

## 2025-03-31 RX ADMIN — MEDROXYPROGESTERONE ACETATE 150 MG: 150 INJECTION, SUSPENSION INTRAMUSCULAR at 13:12

## 2025-04-29 ENCOUNTER — OFFICE VISIT (OUTPATIENT)
Dept: NEUROLOGY | Facility: CLINIC | Age: 50
End: 2025-04-29

## 2025-04-29 VITALS
TEMPERATURE: 98.3 F | BODY MASS INDEX: 29.19 KG/M2 | SYSTOLIC BLOOD PRESSURE: 134 MMHG | DIASTOLIC BLOOD PRESSURE: 82 MMHG | WEIGHT: 171 LBS | HEIGHT: 64 IN | HEART RATE: 90 BPM | OXYGEN SATURATION: 98 %

## 2025-04-29 DIAGNOSIS — R56.9 SEIZURES (HCC): Primary | ICD-10-CM

## 2025-04-29 RX ORDER — BETAMETHASONE DIPROPIONATE 0.5 MG/G
CREAM TOPICAL
COMMUNITY
Start: 2025-04-01

## 2025-04-29 NOTE — PROGRESS NOTES
Name: Mukund Davidson      : 1975      MRN: [id number]  Encounter Provider: SVEN Schultz  Encounter Date: 2025   Encounter department: [facility] [place]  :  Assessment & Plan  Breakthrough seizures  50 y.o. female, with epilepsy, likely focal due to childhood injury, but most recent EEG (slowing) and MRI unrevealing. Her history also includes PDD and MR. She is presenting to [facility] for follow-up of her seizures.     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     Reason for Continued Antiepileptic Medications: High risk for further seizures due to childhood injury.     Plan: Continue carbamazepine  mg twice daily and levetiracetam 500 mg twice daily            Continue clonazepam as prescribed by psychiatry            Call office if you experience any further seizures    Other Concerns: Continue taking calcium and vitamin D supplement as long-term use of carbamazepine can lead to low bone density.            Follow-up:  6 months         History of Present Illness   Mukund Davidson is a 50 y.o. female, with epilepsy, likely focal due to childhood injury, but most recent EEG (slowing) and MRI unrevealing. Her history includes PDD and MR. Seizures had been well controlled, with seizure freedom for greater than 7 years.  But now there have been seizures in 2023 and 2024. Levetiracetam recently added to carbamazepine. She was taken off topiramate in 2020 due to problematic side effects.  no clear behavioral change with levetiracetam at this point. History includes CKD, recent GFR 55. She is presenting to [facility] for follow-up of her seizures. She is accompanied by a caregiver from her group home.     Since the last office visit with Dr. Marquis on 10/9/2024, patient remains seizure-free.  She is taking levetiracetam and  "carbamazepine extended release without side effects.  Patient also continues to take clonazepam as prescribed by psychiatry.  Her caregiver reports that some of her psychiatric medications were changed and she has been doing better overall.     There are no new medical problems.  She does not attend a day program, but likes to go to the [employer] and the zoo.       Current Antiepileptic Medications:  1. Carbamazepine XR 500mg bid   2. Clonazepam .5mg tid 2mg at 8PM (prescribed by psychiatry)  3. Levetiracetam 500mg bid   Other medications as per Epic      Event/Seizure Semiology:  1. Violent shaking with foaming at the mouth that can be preceded by a loud scream     Special Features  Status epilepticus:  Not known  Self Injury Seizures:  None known  Precipitating Factors: Stress     Epilepsy Risk Factors:  Intellectual disability  Head injury (moderate/severe)     Prior AEDs:  Topiramate stopped 2020 in setting of RTA     Prior Evaluation:  MRI in 2008 was read as normal. Dr. Leonard's last note indicates that her last EEG was unrevealing for source and no focal discharges noted. There was generalized encephalopathy.     Prior MRI brain or EEG reports not available in [employer] or Care Everywhere for review.       REEG 9/3/2024 [facility]:   This electroencephalogram is abnormal in the awake and drowsy state(s), as   well as during photic stimulation due to mild to moderate diffuse   background slowing. No epileptiform discharges or seizures noted.        Social History:  Driving:no  Working: no, disability  Lives with: at group home    I reviewed Allergies, Medical History, Surgical History,  Family History and problem list as documented in Epic/Care Everywhere.       Objective   /82 (BP Location: Left arm, Patient Position: Sitting, Cuff Size: Standard)   Pulse 90   Temp 98.3 °F (36.8 °C) (Temporal)   Ht 5' 4\" (1.626 m)   Wt 77.6 kg (171 lb)   LMP  (LMP Unknown)   SpO2 98%   BMI 29.35 kg/m²      General " Exam  In general, patient is well appearing and in no distress.    Neurologic Exam  Mental Status: Alert, follows commands  Language: makes high pitched sounds, non verbal  Cranial Nerves:  PERRL, EOMI, no nystagmus, Face symmetric,   Motor: No pronator drift. Normal bulk and tone. Strength 5/5 throughout  Coordination: no dysmetria when giving high fives   Gait: not tested, in wheelchair     Administrative Statements     Voice recognition software was used in the generation of this note. There may be unintentional errors including grammatical errors, spelling errors, or pronoun errors.

## 2025-04-29 NOTE — ASSESSMENT & PLAN NOTE
50 y.o. female, with epilepsy, likely focal due to childhood injury, but most recent EEG (slowing) and MRI unrevealing. Her history also includes PDD and MR. She is presenting to [facility] for follow-up of her seizures.     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     Reason for Continued Antiepileptic Medications: High risk for further seizures due to childhood injury.     Plan: Continue carbamazepine  mg twice daily and levetiracetam 500 mg twice daily            Continue clonazepam as prescribed by psychiatry            Call office if you experience any further seizures    Other Concerns: Continue taking calcium and vitamin D supplement as long-term use of carbamazepine can lead to low bone density.            Follow-up:  6 months

## 2025-05-19 DIAGNOSIS — G40.209 LOCALIZATION-RELATED (FOCAL) (PARTIAL) SYMPTOMATIC EPILEPSY AND EPILEPTIC SYNDROMES WITH COMPLEX PARTIAL SEIZURES, NOT INTRACTABLE, WITHOUT STATUS EPILEPTICUS (HCC): ICD-10-CM

## 2025-05-19 RX ORDER — MIDAZOLAM 5 MG/.1ML
1 SPRAY NASAL AS NEEDED
Qty: 6 EACH | Refills: 3 | Status: SHIPPED | OUTPATIENT
Start: 2025-05-19

## 2025-05-19 NOTE — TELEPHONE ENCOUNTER
Patient's group home requesting 1 year supply    Reason for call:   [x] Refill   [] Prior Auth  [] Other:     Office:   [] PCP/Provider -   [] Specialty/Provider -     Medication: Nayzilam 5 MG/0.1ML SOLN     Dose/Frequency: FOR SEIZURE >5 MINUTES OR CLUSTER OF SEIZURES, GIVE ONE SPRAY INTO ONE NOSTRIL. IF NO RESPONSE AFTER 10 MINUTES, MAY GIVE 2ND DOSE OF ONE SPRAY IN OTHER NOSTRIL FOR SEIZURE CONTROL     Quantity: 6 each    Pharmacy:   [facility] [address]       Local Pharmacy   Does the patient have enough for 3 days?   [x] Yes   [] No - Send as HP to POD    Mail Away Pharmacy   Does the patient have enough for 10 days?   [] Yes   [] No - Send as HP to POD

## 2025-05-19 NOTE — TELEPHONE ENCOUNTER
Medication: Nayziliam   PDMP  05/05/2025 05/05/2025 03/12/2025 clonazePAM (Tablet) 30.0 30 2 MG NA MELVI [place]DEWICZ [place] [employer] [facility] Medicare 1 / 2 PA   1 [id number] 05/05/2025 05/05/2025 03/12/2025 clonazePAM (Tablet) 60.0 30 0.5 MG NA MELVI [place]WBIBIANA [place] [facility] [place] [facility] Medicare 1 / 2 PA   2 [id number] 11/19/2024 11/11/2024 06/06/2024 clonazePAM (Tablet) 30.0 30 2 MG NA [employer]VI [employer] Medicare 5 / 5 PA   2 [id number] 11/19/2024 11/11/2024 06/06/2024 clonazePAM (Tablet) 60.0 30 0.5 MG NA [employer]ELVI HER[employer]erm[employer] LLC Medicare 0 / 0 PA   2 [id number] 10/14/2024 10/14/2024 10/11/2024 Nayzilam (Spray) 2.0 30 5 MG/1 Actuation NA SIVA BUCHANAN [employer] Medicare 0 / 1 PA   2 [id number] 10/26/2024 10/09/2024 06/06/2024 clonazePAM (Tablet) 30.0 30 2 MG NA MELVI PANVermont Energy LLC Medicare 4 / 5  Active agreement on file -No

## 2025-05-20 ENCOUNTER — OFFICE VISIT (OUTPATIENT)
Dept: AUDIOLOGY | Age: 50
End: 2025-05-20
Payer: MEDICARE

## 2025-05-20 DIAGNOSIS — H90.3 SENSORY HEARING LOSS, BILATERAL: Primary | ICD-10-CM

## 2025-05-20 PROCEDURE — 92579 VISUAL AUDIOMETRY (VRA): CPT | Performed by: AUDIOLOGIST

## 2025-05-20 PROCEDURE — 92567 TYMPANOMETRY: CPT | Performed by: AUDIOLOGIST

## 2025-05-20 NOTE — PROGRESS NOTES
Diagnostic Hearing Evaluation    Name:  Mukund Davidson  :  1975  Age:  50 y.o.  MRN:  [id number]  Date of Evaluation: 25     HISTORY:    Reason for visit: Annual Hearing Test    Mukund Davidson was accompanied to today's appointment by the caregiver, who provided today's case history. Mukund was last seen in our clinic on 24 for an audiometric evaluation, which revealed no behavioral testing results.  There are no concerns for hearing status at home. The caregiver denied observations of otalgia. History of ear infections is negative.     EVALUATION:    Otoscopy  Right: Non-occluding cerumen  Left: Non-occluding cerumen    Tympanometry  Right: Type A; normal middle ear pressure and static compliance   Left: Type A; normal middle ear pressure and static compliance     Distortion Product Otoacoustic Emissions (DPOAEs)  Right: DNT  Left: DNT    Speech Audi[facility]:  [facility]  Speech Awareness/Detection Threshold (SAT/SDT) was obtained via Visual Reinforcement Audiometry (VRA).  Attempted, however, patient did not condition.    *see attached audiogram      RECOMMENDATIONS:  Annual hearing eval, Return to [facility]. for F/U, and Copy to Patient/Caregiver    PATIENT EDUCATION:   The results of today's results and recommendations were reviewed with the caregiver and her hearing thresholds were explained at length.  Questions were addressed and the caregiver was encouraged to contact our department should concerns arise.      Radha Cha, CCC-A  Clinical Audiologist  [facility]  [address]  [place] [address]

## 2025-06-16 ENCOUNTER — TELEPHONE (OUTPATIENT)
Dept: OBGYN CLINIC | Facility: CLINIC | Age: 50
End: 2025-06-16

## 2025-06-16 DIAGNOSIS — N93.9 ABNORMAL UTERINE BLEEDING (AUB): ICD-10-CM

## 2025-06-16 RX ORDER — MEDROXYPROGESTERONE ACETATE 150 MG/ML
150 INJECTION, SUSPENSION INTRAMUSCULAR
Qty: 1 ML | Refills: 0 | Status: SHIPPED | OUTPATIENT
Start: 2025-06-16 | End: 2025-06-16 | Stop reason: SDUPTHER

## 2025-06-16 RX ORDER — MEDROXYPROGESTERONE ACETATE 150 MG/ML
150 INJECTION, SUSPENSION INTRAMUSCULAR
Qty: 1 ML | Refills: 0 | Status: SHIPPED | OUTPATIENT
Start: 2025-06-16

## 2025-06-16 NOTE — TELEPHONE ENCOUNTER
Patient caregiver requesting for depo medication to be changed to [facility] located at [facility] [place], [id number], thank you!

## 2025-06-17 ENCOUNTER — CLINICAL SUPPORT (OUTPATIENT)
Dept: OBGYN CLINIC | Facility: CLINIC | Age: 50
End: 2025-06-17
Payer: MEDICARE

## 2025-06-17 VITALS
BODY MASS INDEX: 29.19 KG/M2 | HEIGHT: 64 IN | DIASTOLIC BLOOD PRESSURE: 70 MMHG | SYSTOLIC BLOOD PRESSURE: 120 MMHG | WEIGHT: 171 LBS

## 2025-06-17 DIAGNOSIS — Z30.42 ENCOUNTER FOR SURVEILLANCE OF INJECTABLE CONTRACEPTIVE: Primary | ICD-10-CM

## 2025-06-17 PROCEDURE — 96372 THER/PROPH/DIAG INJ SC/IM: CPT

## 2025-06-17 RX ORDER — MEDROXYPROGESTERONE ACETATE 150 MG/ML
150 INJECTION, SUSPENSION INTRAMUSCULAR ONCE
Status: COMPLETED | OUTPATIENT
Start: 2025-06-17 | End: 2025-06-17

## 2025-06-17 RX ADMIN — MEDROXYPROGESTERONE ACETATE 150 MG: 150 INJECTION, SUSPENSION INTRAMUSCULAR at 14:49

## 2025-07-13 ENCOUNTER — OFFICE VISIT (OUTPATIENT)
Dept: URGENT CARE | Facility: CLINIC | Age: 50
End: 2025-07-13
Payer: MEDICARE

## 2025-07-13 ENCOUNTER — APPOINTMENT (OUTPATIENT)
Dept: URGENT CARE | Facility: CLINIC | Age: 50
End: 2025-07-13
Payer: MEDICARE

## 2025-07-13 VITALS
OXYGEN SATURATION: 98 % | TEMPERATURE: 97 F | HEART RATE: 84 BPM | SYSTOLIC BLOOD PRESSURE: 150 MMHG | DIASTOLIC BLOOD PRESSURE: 90 MMHG | BODY MASS INDEX: 29.18 KG/M2 | RESPIRATION RATE: 22 BRPM | WEIGHT: 170 LBS

## 2025-07-13 DIAGNOSIS — R39.9 UTI SYMPTOMS: Primary | ICD-10-CM

## 2025-07-13 LAB
SL AMB  POCT GLUCOSE, UA: ABNORMAL
SL AMB LEUKOCYTE ESTERASE,UA: ABNORMAL
SL AMB POCT BILIRUBIN,UA: ABNORMAL
SL AMB POCT BLOOD,UA: ABNORMAL
SL AMB POCT CLARITY,UA: CLEAR
SL AMB POCT COLOR,UA: YELLOW
SL AMB POCT KETONES,UA: ABNORMAL
SL AMB POCT NITRITE,UA: ABNORMAL
SL AMB POCT PH,UA: 6
SL AMB POCT SPECIFIC GRAVITY,UA: 1
SL AMB POCT URINE PROTEIN: ABNORMAL
SL AMB POCT UROBILINOGEN: 0.2

## 2025-07-13 PROCEDURE — G0463 HOSPITAL OUTPT CLINIC VISIT: HCPCS | Performed by: PREVENTIVE MEDICINE

## 2025-07-13 PROCEDURE — 81002 URINALYSIS NONAUTO W/O SCOPE: CPT | Performed by: PREVENTIVE MEDICINE

## 2025-07-13 PROCEDURE — 87086 URINE CULTURE/COLONY COUNT: CPT | Performed by: PREVENTIVE MEDICINE

## 2025-07-13 PROCEDURE — 99213 OFFICE O/P EST LOW 20 MIN: CPT | Performed by: PREVENTIVE MEDICINE

## 2025-07-13 NOTE — PROGRESS NOTES
[facility]  Name: Mukund Davidson      : 1975      MRN: [id number]  Encounter Provider: Julian Donohue MD  Encounter Date: 2025   Encounter department: [facility]  :  Assessment & Plan  UTI symptoms    Orders:    POCT urine dip    Urine culture        Patient Instructions  Follow up with PCP in 3-5 days.  Proceed to  ER if symptoms worsen.    If tests are performed, our office will contact you with results only if changes need to made to the care plan discussed with you at the visit. You can review your full results on [facility] MyChart.    Chief Complaint:   Chief Complaint   Patient presents with    Possible UTI     Care taker reports of increased frequency in incontinence at the group home.      History of Present Illness   Bedwetting and some incontinence.  They are here to rule out urinary tract infection          Review of Systems   Genitourinary:  Positive for urgency.     Past Medical History   Past Medical History[1]  Past Surgical History[2]  Family History[3]  she reports that she has never smoked. She has never used smokeless tobacco. She reports that she does not drink alcohol and does not use drugs.  Current Outpatient Medications   Medication Instructions    acetaminophen (TYLENOL) 500 mg, Every 4 hours PRN    amLODIPine (NORVASC) 10 mg, Daily    ammonium lactate (LAC-HYDRIN) 12 % cream 2 times daily    B Complex Vitamins (VITAMIN B COMPLEX PO) 1 tablet, 2 times daily    benztropine (COGENTIN) 1 mg, 2 times daily    betamethasone dipropionate (DIPROSONE) 0.05 % cream 2 times daily    betamethasone, augmented, (DIPROLENE-AF) 0.05 % cream     bisacodyl (DULCOLAX) 5 mg EC tablet Take as directed as per written office instructions    busPIRone (BUSPAR) 30 mg, Oral, 2 times daily    Calcium Carb-Cholecalciferol (OYSCO 500 + D) 500 mg-200 units per tablet 2 times daily    carBAMazepine (TEGRETOL XR) 100 mg, Oral, Every 12 hours, 8 am and 8 pm (total dose:  500 mg twice daily)    carBAMazepine (TEGRETOL XR) 400 mg, Oral, 2 times daily, 8 am and 8 pm (total dose: 500 mg twice daily)    chlorhexidine (PERIDEX) 0.12 % solution 15 mL, 2 times daily    chlorproMAZINE (THORAZINE) 100 mg tablet     chlorproMAZINE (THORAZINE) 50 mg tablet     clonazePAM (KlonoPIN) 2 mg tablet     clonazePAM (KLONOPIN) 0.5 mg, Oral, 3 times daily    Cold Sore Products (HERPECIN-L EX) As needed    diazepam (VALIUM) 2 mg tablet     fexofenadine (ALLEGRA) 180 mg, Daily    fluticasone (FLONASE) 50 mcg/act nasal spray 1 spray, Nasal, Daily PRN    hydrOXYzine HCL (ATARAX) 25 mg tablet No dose, route, or frequency recorded.    levETIRAcetam (KEPPRA) 500 mg, Oral, 2 times daily    lithium carbonate 300 mg, 2 times daily with meals    medroxyPROGESTERone acetate (DEPO-PROVERA SYRINGE) 150 mg, Intramuscular, Every 3 months    metoprolol tartrate (LOPRESSOR) 50 mg, 2 times daily    Midazolam (Nayzilam) 5 MG/0.1ML SOLN 1 spray, Nasal, As needed, FOR SEIZURE >5 MINUTES OR CLUSTER OF SEIZURES, GIVE ONE SPRAY INTO ONE NOSTRIL. IF NO RESPONSE AFTER 10 MINUTES, MAY GIVE 2ND DOSE OF ONE SPRAY IN OTHER NOSTRIL FOR SEIZURE CONTROL    Misc. Devices (Toothette Bite Block) MISC 2 times daily    montelukast (SINGULAIR) 10 mg, Daily at bedtime    Multiple Vitamins-Minerals (CERTAVITE/ANTIOXIDANTS PO) 1 tablet, Daily    neomycin-bacitracin-polymyxin b (NEOSPORIN) ointment As needed    olopatadine HCl (PATADAY) 0.2 % opth drops 1 drop, Daily    polyethylene glycol (GLYCOLAX) 17 GM/SCOOP powder Take as directed as per written office instructions    Psyllium (METAMUCIL) WAFR 1 Wafer, Daily    risperiDONE (RISPERDAL M-TAB) 1 mg, 3 times daily    risperiDONE (RisperDAL) 1 mg tablet     Sunscreens (CHAPSTICK SUNBLOCK 15 EX) As needed    Sunscreens (NEUTROGENA SENSITIVE SKIN 60+ EX) As needed   Allergies[4]     Objective   /90   Pulse 84   Temp (!) 97 °F (36.1 °C)   Resp 22   Wt 77.1 kg (170 lb)   LMP  (LMP Unknown)  "  SpO2 98%   BMI 29.18 kg/m²      Physical Exam  Genitourinary:     Comments: Urine analysis shows only trace leukocytes no blood        Portions of the record may have been created with voice recognition software.  Occasional wrong word or \"sound a like\" substitutions may have occurred due to the inherent limitations of voice recognition software.  Read the chart carefully and recognize, using context, where substitutions have occurred.       [1]   Past Medical History:  Diagnosis Date    Brain injury (HCC)     Encephalopathy     Hypertension     Seizures (HCC)    [2] No past surgical history on file.  [3]   Family History  Problem Relation Name Age of Onset    No Known Problems Mother      No Known Problems Father      No Known Problems Sister      No Known Problems Maternal Grandmother      No Known Problems Maternal Grandfather      No Known Problems Paternal Grandmother      No Known Problems Paternal Grandfather     [4]   Allergies  Allergen Reactions    Inderal [Propranolol] Shortness Of Breath    Trileptal [Oxcarbazepine] Other (See Comments)     Unknown; per care taker from group home     Catapres [Clonidine Hcl]      "

## 2025-07-15 LAB — BACTERIA UR CULT: NORMAL

## 2025-08-18 DIAGNOSIS — N93.9 ABNORMAL UTERINE BLEEDING (AUB): ICD-10-CM

## 2025-08-20 RX ORDER — MEDROXYPROGESTERONE ACETATE 150 MG/ML
150 INJECTION, SUSPENSION INTRAMUSCULAR
Qty: 1 ML | Refills: 0 | Status: SHIPPED | OUTPATIENT
Start: 2025-08-20